# Patient Record
Sex: MALE | Race: BLACK OR AFRICAN AMERICAN | NOT HISPANIC OR LATINO | Employment: FULL TIME | URBAN - METROPOLITAN AREA
[De-identification: names, ages, dates, MRNs, and addresses within clinical notes are randomized per-mention and may not be internally consistent; named-entity substitution may affect disease eponyms.]

---

## 2017-07-29 ENCOUNTER — HOSPITAL ENCOUNTER (EMERGENCY)
Facility: HOSPITAL | Age: 57
Discharge: HOME | End: 2017-07-29
Attending: INTERNAL MEDICINE
Payer: COMMERCIAL

## 2017-07-29 VITALS
SYSTOLIC BLOOD PRESSURE: 141 MMHG | DIASTOLIC BLOOD PRESSURE: 90 MMHG | HEART RATE: 91 BPM | WEIGHT: 239 LBS | BODY MASS INDEX: 30.67 KG/M2 | TEMPERATURE: 98 F | HEIGHT: 74 IN | RESPIRATION RATE: 20 BRPM

## 2017-07-29 DIAGNOSIS — M75.52 ACUTE SHOULDER BURSITIS, LEFT: ICD-10-CM

## 2017-07-29 DIAGNOSIS — I10 UNCONTROLLED HYPERTENSION: Primary | ICD-10-CM

## 2017-07-29 PROCEDURE — 99283 EMERGENCY DEPT VISIT LOW MDM: CPT

## 2017-07-29 PROCEDURE — 25000003 PHARM REV CODE 250: Performed by: INTERNAL MEDICINE

## 2017-07-29 RX ORDER — METFORMIN HYDROCHLORIDE 1000 MG/1
1000 TABLET ORAL 2 TIMES DAILY WITH MEALS
COMMUNITY

## 2017-07-29 RX ORDER — IBUPROFEN 600 MG/1
600 TABLET ORAL EVERY 6 HOURS PRN
Qty: 20 TABLET | Refills: 0 | Status: SHIPPED | OUTPATIENT
Start: 2017-07-29 | End: 2018-01-25

## 2017-07-29 RX ORDER — LOSARTAN POTASSIUM 25 MG/1
25 TABLET ORAL DAILY
COMMUNITY

## 2017-07-29 RX ORDER — GLIPIZIDE 5 MG/1
5 TABLET, FILM COATED, EXTENDED RELEASE ORAL
COMMUNITY

## 2017-07-29 RX ORDER — KETOROLAC TROMETHAMINE 10 MG/1
10 TABLET, FILM COATED ORAL
Status: COMPLETED | OUTPATIENT
Start: 2017-07-29 | End: 2017-07-29

## 2017-07-29 RX ORDER — AMLODIPINE BESYLATE 2.5 MG/1
2.5 TABLET ORAL DAILY
COMMUNITY

## 2017-07-29 RX ADMIN — KETOROLAC TROMETHAMINE 10 MG: 10 TABLET, FILM COATED ORAL at 11:07

## 2017-07-29 NOTE — ED PROVIDER NOTES
Encounter Date: 7/29/2017       History   Pt presents with left shoulder pain for the last 2-3 days. Pt states he think is an old football injury. Denies recent trauma, fever, chest pain, shortness of breath, nausea or vomiting. States strength, sensation and movement are normal.  Chief Complaint   Patient presents with    Shoulder Pain     left shoulder pain flare up x 3 days - no recent injury     The history is provided by the patient.     Review of patient's allergies indicates:  No Known Allergies  Past Medical History:   Diagnosis Date    Diabetes mellitus     Hypertension      Past Surgical History:   Procedure Laterality Date    KNEE SURGERY      right     History reviewed. No pertinent family history.  Social History   Substance Use Topics    Smoking status: Former Smoker    Smokeless tobacco: Never Used    Alcohol use Yes      Comment: occas     Review of Systems   Constitutional: Negative for chills and fever.   HENT: Negative for dental problem and sore throat.    Eyes: Negative for visual disturbance.   Respiratory: Negative for cough and shortness of breath.    Cardiovascular: Negative for chest pain.   Gastrointestinal: Negative for abdominal pain, blood in stool, diarrhea and vomiting.   Genitourinary: Negative for difficulty urinating, discharge, dysuria and testicular pain.   Musculoskeletal: Positive for arthralgias. Negative for back pain and myalgias.   Skin: Negative for rash.   Neurological: Negative for weakness and headaches.   Psychiatric/Behavioral: Negative for sleep disturbance.   All other systems reviewed and are negative.      Physical Exam     Initial Vitals [07/29/17 1059]   BP Pulse Resp Temp SpO2   (!) 141/90 91 20 97.9 °F (36.6 °C) --      MAP       107         Physical Exam    Nursing note and vitals reviewed.  Constitutional: He appears well-developed and well-nourished. No distress.   HENT:   Head: Normocephalic and atraumatic.   Eyes: Pupils are equal, round, and  reactive to light.   Neck: Normal range of motion.   Cardiovascular: Regular rhythm, normal heart sounds and intact distal pulses.   Pulmonary/Chest: Breath sounds normal. No respiratory distress.   Musculoskeletal: Normal range of motion. He exhibits tenderness (Point tenderness ovber posterior acromion area, N-V intact, Full AROM). He exhibits no edema.   Neurological: He is alert and oriented to person, place, and time. He has normal strength.   Skin: Skin is warm and dry. Capillary refill takes less than 2 seconds. No rash noted.   Psychiatric: His behavior is normal.         ED Course   Procedures  Labs Reviewed - No data to display                            ED Course     Clinical Impression:   The primary encounter diagnosis was Uncontrolled hypertension. A diagnosis of Acute shoulder bursitis, left was also pertinent to this visit.                           Saurabh Huertas MD  07/29/17 7411

## 2017-07-31 ENCOUNTER — HOSPITAL ENCOUNTER (OUTPATIENT)
Dept: RADIOLOGY | Facility: HOSPITAL | Age: 57
Discharge: HOME | End: 2017-07-31
Attending: NURSE PRACTITIONER
Payer: COMMERCIAL

## 2017-07-31 DIAGNOSIS — M25.512 LEFT SHOULDER PAIN: ICD-10-CM

## 2017-07-31 DIAGNOSIS — M54.50 CHRONIC LOW BACK PAIN: ICD-10-CM

## 2017-07-31 DIAGNOSIS — M25.512 LEFT SHOULDER PAIN: Primary | ICD-10-CM

## 2017-07-31 DIAGNOSIS — G89.29 CHRONIC LOW BACK PAIN: ICD-10-CM

## 2017-07-31 PROCEDURE — 73030 X-RAY EXAM OF SHOULDER: CPT | Mod: 26,LT,, | Performed by: RADIOLOGY

## 2017-07-31 PROCEDURE — 73030 X-RAY EXAM OF SHOULDER: CPT | Mod: TC,PO,LT

## 2018-01-25 ENCOUNTER — HOSPITAL ENCOUNTER (EMERGENCY)
Facility: HOSPITAL | Age: 58
Discharge: HOME | End: 2018-01-25
Attending: EMERGENCY MEDICINE
Payer: COMMERCIAL

## 2018-01-25 VITALS
WEIGHT: 246 LBS | BODY MASS INDEX: 31.58 KG/M2 | OXYGEN SATURATION: 100 % | DIASTOLIC BLOOD PRESSURE: 89 MMHG | RESPIRATION RATE: 20 BRPM | TEMPERATURE: 98 F | HEART RATE: 74 BPM | SYSTOLIC BLOOD PRESSURE: 152 MMHG

## 2018-01-25 DIAGNOSIS — J06.9 UPPER RESPIRATORY TRACT INFECTION, UNSPECIFIED TYPE: Primary | ICD-10-CM

## 2018-01-25 PROCEDURE — 99283 EMERGENCY DEPT VISIT LOW MDM: CPT

## 2018-01-25 RX ORDER — CETIRIZINE HYDROCHLORIDE 10 MG/1
10 TABLET ORAL DAILY
Qty: 30 TABLET | Refills: 0 | COMMUNITY
Start: 2018-01-25 | End: 2019-01-25

## 2018-01-25 NOTE — ED NOTES
Physical exam as per provider.  Pt sitting quietly on exam table in NAD.  Respirations even and unlabored.  No s/s of distress noted.

## 2018-01-26 ENCOUNTER — HOSPITAL ENCOUNTER (EMERGENCY)
Facility: HOSPITAL | Age: 58
Discharge: HOME | End: 2018-01-26
Payer: COMMERCIAL

## 2018-01-26 VITALS
TEMPERATURE: 98 F | RESPIRATION RATE: 20 BRPM | HEART RATE: 76 BPM | DIASTOLIC BLOOD PRESSURE: 91 MMHG | BODY MASS INDEX: 30.54 KG/M2 | WEIGHT: 238 LBS | HEIGHT: 74 IN | SYSTOLIC BLOOD PRESSURE: 155 MMHG | OXYGEN SATURATION: 100 %

## 2018-01-26 DIAGNOSIS — J06.9 UPPER RESPIRATORY TRACT INFECTION, UNSPECIFIED TYPE: Primary | ICD-10-CM

## 2018-01-26 DIAGNOSIS — R05.9 COUGH: ICD-10-CM

## 2018-01-26 PROCEDURE — 99283 EMERGENCY DEPT VISIT LOW MDM: CPT

## 2018-01-26 RX ORDER — PROMETHAZINE HYDROCHLORIDE AND DEXTROMETHORPHAN HYDROBROMIDE 6.25; 15 MG/5ML; MG/5ML
SYRUP ORAL
Qty: 120 ML | Refills: 0 | Status: SHIPPED | OUTPATIENT
Start: 2018-01-26

## 2018-01-27 NOTE — ED PROVIDER NOTES
"Encounter Date: 1/26/2018       History     Chief Complaint   Patient presents with    Nasal Congestion     with N/V/D since yesterday. pt states "i was here yesterday and they didnt give me anything and im not any better"     The patient presents to the ER c/o cold and flu symptoms for the past 3-4 days. He reports having coughing, sinus congestion, nasal drainage, and chills. He states that the degree is moderate. He states that the course is waxing and waning. He denies any additional symptoms. He states that he was seen in the ER for this yesterday and "they didn't give me anything. I want a shot or some cough syrup or something". He denies any new symptoms or changes since yesterday.           Review of patient's allergies indicates:  No Known Allergies  Past Medical History:   Diagnosis Date    Diabetes mellitus     Hypertension      Past Surgical History:   Procedure Laterality Date    KNEE SURGERY      right     History reviewed. No pertinent family history.  Social History   Substance Use Topics    Smoking status: Former Smoker    Smokeless tobacco: Never Used    Alcohol use Yes      Comment: occas     Review of Systems   Constitutional: Positive for chills. Negative for activity change, appetite change, diaphoresis, fatigue and fever.   HENT: Positive for congestion and rhinorrhea. Negative for ear pain, facial swelling, sore throat, trouble swallowing and voice change.    Eyes: Negative for discharge and redness.   Respiratory: Positive for cough. Negative for chest tightness, shortness of breath, wheezing and stridor.    Cardiovascular: Negative for chest pain.   Gastrointestinal: Negative for abdominal pain, blood in stool, diarrhea and vomiting.   Genitourinary: Negative for decreased urine volume.   Musculoskeletal: Positive for myalgias. Negative for back pain, gait problem, neck pain and neck stiffness.   Skin: Negative for rash.   Neurological: Negative for dizziness, syncope, weakness, " light-headedness and headaches.   Hematological: Negative for adenopathy.   Psychiatric/Behavioral: Negative for confusion.       Physical Exam     Initial Vitals [01/26/18 1721]   BP Pulse Resp Temp SpO2   (!) 155/91 76 20 98.1 °F (36.7 °C) 100 %      MAP       112.33         Physical Exam    Nursing note and vitals reviewed.  Constitutional: He appears well-developed and well-nourished. He is not diaphoretic.   Alert and ambulatory. Non-toxic appearance.    HENT:   Swollen nasal mucosa. Rhinorrhea. Clear throat. No adenopathy. Normal otic exam. No hoarseness.     Eyes: Conjunctivae are normal. Right eye exhibits no discharge. Left eye exhibits no discharge.   Neck: Normal range of motion. Neck supple.   Non-tender. No rigidity.    Cardiovascular: Normal rate, regular rhythm and intact distal pulses.   Pulmonary/Chest: Breath sounds normal. No respiratory distress.   Mild, infrequent cough. No VASQUEZ. No tachypnea or hypoxia. No wheezes. Speaks in complete sentences.    Abdominal: Soft. There is no tenderness. There is no rebound and no guarding.   Musculoskeletal: Normal range of motion. He exhibits no edema.   Lymphadenopathy:     He has no cervical adenopathy.   Neurological: He is alert and oriented to person, place, and time. He has normal strength. No cranial nerve deficit or sensory deficit.   Skin: Skin is warm and dry. No rash noted.   Psychiatric: He has a normal mood and affect. His behavior is normal.         ED Course   Procedures  Labs Reviewed - No data to display          Medical Decision Making:   History:   Old Medical Records: I decided to obtain old medical records.                   ED Course      Clinical Impression:   The primary encounter diagnosis was Upper respiratory tract infection, unspecified type. A diagnosis of Cough was also pertinent to this visit.    Disposition:   Disposition: Discharged  Condition: Stable                        Javier Anderson PA-C  01/26/18 9590

## 2018-01-27 NOTE — ED NOTES
Pt stable, in NAD, and states no further needs at this time. PA aware of vitals. Pt to be d/c'd home.

## 2018-01-29 NOTE — ED PROVIDER NOTES
"Encounter Date: 1/25/2018       History     Chief Complaint   Patient presents with    Shortness of Breath     + nasal congestion; believes he had the flu last week; + "nagging cough" with unknown color phlegm      Patient currently presents with a chief complaint of cough.  Onset was noted 5-7d ago.  There is associated rhinorrhea and congestion.  Fever and chills are denied.  Vomiting and diarrhea are denied.  Over-the-counter remedies have not been attempted.  There has not been purulent nasal discharge. Cough has been nonproductive.              Review of patient's allergies indicates:  No Known Allergies  Past Medical History:   Diagnosis Date    Diabetes mellitus     Hypertension      Past Surgical History:   Procedure Laterality Date    KNEE SURGERY      right     History reviewed. No pertinent family history.  Social History   Substance Use Topics    Smoking status: Former Smoker    Smokeless tobacco: Never Used    Alcohol use Yes      Comment: occas     Review of Systems   Constitutional: Negative for chills and fever.   HENT: Positive for congestion, postnasal drip and rhinorrhea. Negative for sinus pressure.    Respiratory: Positive for cough. Negative for chest tightness and shortness of breath.    Cardiovascular: Negative for chest pain and leg swelling.   Gastrointestinal: Negative for abdominal pain, constipation, diarrhea, nausea and vomiting.   Genitourinary: Negative for dysuria, frequency and urgency.   Skin: Negative for color change and rash.   Allergic/Immunologic: Negative for immunocompromised state.   Neurological: Negative for weakness and numbness.   Hematological: Negative for adenopathy. Does not bruise/bleed easily.   All other systems reviewed and are negative.      Physical Exam     Initial Vitals [01/25/18 0126]   BP Pulse Resp Temp SpO2   (!) 152/89 74 20 98.1 °F (36.7 °C) 100 %      MAP       110         Physical Exam    Nursing note and vitals reviewed.  Constitutional: He " appears well-developed and well-nourished. He is not diaphoretic. No distress.   HENT:   Head: Normocephalic and atraumatic.   Right Ear: External ear normal.   Left Ear: External ear normal.   Nose: Rhinorrhea present.   Mouth/Throat: Oropharynx is clear and moist.   Eyes: Conjunctivae and EOM are normal. Pupils are equal, round, and reactive to light. No scleral icterus.   Neck: Neck supple. No JVD present.   Cardiovascular: Normal rate, regular rhythm, normal heart sounds and intact distal pulses. Exam reveals no gallop and no friction rub.    No murmur heard.  Pulmonary/Chest: Breath sounds normal. No respiratory distress. He has no wheezes. He has no rhonchi. He has no rales.   Abdominal: Soft. Bowel sounds are normal. He exhibits no distension. There is no tenderness.   Musculoskeletal: Normal range of motion. He exhibits no edema.   Neurological: He is alert and oriented to person, place, and time. He has normal strength. No cranial nerve deficit or sensory deficit.   Skin: Skin is warm and dry. No rash noted.   Psychiatric: He has a normal mood and affect. His behavior is normal.         ED Course   Procedures  Labs Reviewed - No data to display          Medical Decision Making:   ED Management:  All findings were reviewed with the patient/family in detail along with the diagnosis of URI.  Patient/family has been advised to use an appropriate antihistamine and expectorant/antitussive agents for symptomatic relief pending resolution and PCP follow-up.  I see no indication of an emergent process beyond that addressed during our encounter but have duly counseled the patient/family regarding the need for prompt follow-up as well as the indications that should prompt immediate return to the emergency room should new or worrisome developments occur.  The patient/family communicates understanding of all this information and all remaining questions and concerns were addressed at this time.  Tye Springer,  MD  6:39 AM                     ED Course      Clinical Impression:   The encounter diagnosis was Upper respiratory tract infection, unspecified type.                           Tye Springer MD  01/29/18 0653

## 2018-07-16 ENCOUNTER — HOSPITAL ENCOUNTER (OUTPATIENT)
Dept: RADIOLOGY | Facility: HOSPITAL | Age: 58
Discharge: HOME | End: 2018-07-16
Attending: NURSE PRACTITIONER
Payer: COMMERCIAL

## 2018-07-16 DIAGNOSIS — R10.11 ABDOMINAL PAIN, RIGHT UPPER QUADRANT: ICD-10-CM

## 2018-07-16 DIAGNOSIS — R10.11 ABDOMINAL PAIN, RIGHT UPPER QUADRANT: Primary | ICD-10-CM

## 2018-07-16 PROCEDURE — 71100 X-RAY EXAM RIBS UNI 2 VIEWS: CPT | Mod: TC,PO

## 2018-07-16 PROCEDURE — 71045 X-RAY EXAM CHEST 1 VIEW: CPT | Mod: TC,PO

## 2018-07-16 PROCEDURE — 71100 X-RAY EXAM RIBS UNI 2 VIEWS: CPT | Mod: 26,,, | Performed by: RADIOLOGY

## 2018-07-16 PROCEDURE — 71045 X-RAY EXAM CHEST 1 VIEW: CPT | Mod: 26,,, | Performed by: RADIOLOGY

## 2018-07-16 PROCEDURE — 76705 ECHO EXAM OF ABDOMEN: CPT | Mod: TC,PO

## 2018-07-16 PROCEDURE — 76705 ECHO EXAM OF ABDOMEN: CPT | Mod: 26,,, | Performed by: RADIOLOGY

## 2021-07-26 ENCOUNTER — HOSPITAL ENCOUNTER (OUTPATIENT)
Dept: RADIOLOGY | Facility: HOSPITAL | Age: 61
Discharge: HOME | End: 2021-07-26
Attending: FAMILY MEDICINE
Payer: MEDICAID

## 2021-07-26 DIAGNOSIS — R10.9 STOMACH PAIN: Primary | ICD-10-CM

## 2021-07-26 DIAGNOSIS — R10.9 STOMACH PAIN: ICD-10-CM

## 2021-07-26 PROCEDURE — 76705 ECHO EXAM OF ABDOMEN: CPT | Mod: TC,PO

## 2021-07-26 PROCEDURE — 76705 ECHO EXAM OF ABDOMEN: CPT | Mod: 26,,, | Performed by: RADIOLOGY

## 2021-07-26 PROCEDURE — 76705 US ABDOMEN LIMITED_HERNIA: ICD-10-PCS | Mod: 26,,, | Performed by: RADIOLOGY

## 2023-09-01 ENCOUNTER — HOSPITAL ENCOUNTER (EMERGENCY)
Facility: HOSPITAL | Age: 63
Discharge: HOME | End: 2023-09-01
Attending: EMERGENCY MEDICINE
Payer: COMMERCIAL

## 2023-09-01 VITALS
WEIGHT: 239.5 LBS | SYSTOLIC BLOOD PRESSURE: 150 MMHG | HEART RATE: 57 BPM | TEMPERATURE: 98 F | BODY MASS INDEX: 30.74 KG/M2 | RESPIRATION RATE: 18 BRPM | HEIGHT: 74 IN | OXYGEN SATURATION: 100 % | DIASTOLIC BLOOD PRESSURE: 79 MMHG

## 2023-09-01 DIAGNOSIS — R79.89 ELEVATED TROPONIN: ICD-10-CM

## 2023-09-01 DIAGNOSIS — R06.02 SOB (SHORTNESS OF BREATH): ICD-10-CM

## 2023-09-01 DIAGNOSIS — E87.5 HYPERKALEMIA: Primary | ICD-10-CM

## 2023-09-01 DIAGNOSIS — R10.13 EPIGASTRIC PAIN: ICD-10-CM

## 2023-09-01 LAB
ALBUMIN SERPL BCP-MCNC: 3.7 G/DL (ref 3.5–5.2)
ALP SERPL-CCNC: 76 U/L (ref 55–135)
ALT SERPL W/O P-5'-P-CCNC: 12 U/L (ref 10–44)
ANION GAP SERPL CALC-SCNC: 7 MMOL/L (ref 8–16)
APTT PPP: 26.3 SEC (ref 21–32)
AST SERPL-CCNC: 17 U/L (ref 10–40)
BASOPHILS # BLD AUTO: 0.03 K/UL (ref 0–0.2)
BASOPHILS NFR BLD: 0.8 % (ref 0–1.9)
BILIRUB SERPL-MCNC: 0.5 MG/DL (ref 0.1–1)
BNP SERPL-MCNC: 63 PG/ML (ref 0–99)
BUN SERPL-MCNC: 34 MG/DL (ref 8–23)
CALCIUM SERPL-MCNC: 8.7 MG/DL (ref 8.7–10.5)
CHLORIDE SERPL-SCNC: 110 MMOL/L (ref 95–110)
CO2 SERPL-SCNC: 21 MMOL/L (ref 23–29)
CREAT SERPL-MCNC: 2.9 MG/DL (ref 0.5–1.4)
CTP QC/QA: YES
DIFFERENTIAL METHOD: ABNORMAL
EOSINOPHIL # BLD AUTO: 0.2 K/UL (ref 0–0.5)
EOSINOPHIL NFR BLD: 3.8 % (ref 0–8)
ERYTHROCYTE [DISTWIDTH] IN BLOOD BY AUTOMATED COUNT: 11.9 % (ref 11.5–14.5)
EST. GFR  (NO RACE VARIABLE): 23.9 ML/MIN/1.73 M^2
GLUCOSE SERPL-MCNC: 160 MG/DL (ref 70–110)
HCT VFR BLD AUTO: 32 % (ref 40–54)
HGB BLD-MCNC: 11 G/DL (ref 14–18)
IMM GRANULOCYTES # BLD AUTO: 0.01 K/UL (ref 0–0.04)
IMM GRANULOCYTES NFR BLD AUTO: 0.3 % (ref 0–0.5)
INR PPP: 1 (ref 0.8–1.2)
LIPASE SERPL-CCNC: 49 U/L (ref 4–60)
LYMPHOCYTES # BLD AUTO: 1 K/UL (ref 1–4.8)
LYMPHOCYTES NFR BLD: 24.1 % (ref 18–48)
MCH RBC QN AUTO: 30.1 PG (ref 27–31)
MCHC RBC AUTO-ENTMCNC: 34.4 G/DL (ref 32–36)
MCV RBC AUTO: 87 FL (ref 82–98)
MONOCYTES # BLD AUTO: 0.4 K/UL (ref 0.3–1)
MONOCYTES NFR BLD: 10.6 % (ref 4–15)
NEUTROPHILS # BLD AUTO: 2.4 K/UL (ref 1.8–7.7)
NEUTROPHILS NFR BLD: 60.4 % (ref 38–73)
NRBC BLD-RTO: 0 /100 WBC
PLATELET # BLD AUTO: 149 K/UL (ref 150–450)
PMV BLD AUTO: 9.2 FL (ref 9.2–12.9)
POCT GLUCOSE: 171 MG/DL (ref 70–110)
POTASSIUM SERPL-SCNC: 5.2 MMOL/L (ref 3.5–5.1)
PROT SERPL-MCNC: 7.4 G/DL (ref 6–8.4)
PROTHROMBIN TIME: 10.9 SEC (ref 9–12.5)
RBC # BLD AUTO: 3.66 M/UL (ref 4.6–6.2)
SARS-COV-2 RDRP RESP QL NAA+PROBE: NEGATIVE
SODIUM SERPL-SCNC: 138 MMOL/L (ref 136–145)
TROPONIN I SERPL DL<=0.01 NG/ML-MCNC: 0.18 NG/ML (ref 0–0.03)
TROPONIN I SERPL DL<=0.01 NG/ML-MCNC: 0.19 NG/ML (ref 0–0.03)
WBC # BLD AUTO: 3.95 K/UL (ref 3.9–12.7)

## 2023-09-01 PROCEDURE — 85025 COMPLETE CBC W/AUTO DIFF WBC: CPT | Mod: ER | Performed by: PHYSICIAN ASSISTANT

## 2023-09-01 PROCEDURE — 80053 COMPREHEN METABOLIC PANEL: CPT | Mod: ER | Performed by: PHYSICIAN ASSISTANT

## 2023-09-01 PROCEDURE — 82962 GLUCOSE BLOOD TEST: CPT | Mod: ER

## 2023-09-01 PROCEDURE — 93005 ELECTROCARDIOGRAM TRACING: CPT | Mod: ER

## 2023-09-01 PROCEDURE — 85730 THROMBOPLASTIN TIME PARTIAL: CPT | Mod: ER | Performed by: EMERGENCY MEDICINE

## 2023-09-01 PROCEDURE — 87635 SARS-COV-2 COVID-19 AMP PRB: CPT | Mod: ER | Performed by: PHYSICIAN ASSISTANT

## 2023-09-01 PROCEDURE — 83880 ASSAY OF NATRIURETIC PEPTIDE: CPT | Mod: ER | Performed by: PHYSICIAN ASSISTANT

## 2023-09-01 PROCEDURE — 83690 ASSAY OF LIPASE: CPT | Mod: ER | Performed by: PHYSICIAN ASSISTANT

## 2023-09-01 PROCEDURE — 99285 EMERGENCY DEPT VISIT HI MDM: CPT | Mod: 25,ER

## 2023-09-01 PROCEDURE — 85610 PROTHROMBIN TIME: CPT | Mod: ER | Performed by: EMERGENCY MEDICINE

## 2023-09-01 PROCEDURE — 25000003 PHARM REV CODE 250: Mod: ER | Performed by: PHYSICIAN ASSISTANT

## 2023-09-01 PROCEDURE — 84484 ASSAY OF TROPONIN QUANT: CPT | Mod: 91,ER | Performed by: EMERGENCY MEDICINE

## 2023-09-01 PROCEDURE — 93010 EKG 12-LEAD: ICD-10-PCS | Mod: ,,, | Performed by: STUDENT IN AN ORGANIZED HEALTH CARE EDUCATION/TRAINING PROGRAM

## 2023-09-01 PROCEDURE — 84484 ASSAY OF TROPONIN QUANT: CPT | Mod: ER | Performed by: PHYSICIAN ASSISTANT

## 2023-09-01 PROCEDURE — 93010 ELECTROCARDIOGRAM REPORT: CPT | Mod: ,,, | Performed by: STUDENT IN AN ORGANIZED HEALTH CARE EDUCATION/TRAINING PROGRAM

## 2023-09-01 RX ORDER — FLUTICASONE PROPIONATE 50 MCG
SPRAY, SUSPENSION (ML) NASAL
COMMUNITY
Start: 2023-08-08

## 2023-09-01 RX ORDER — CARVEDILOL 12.5 MG/1
TABLET ORAL
COMMUNITY
Start: 2023-07-03

## 2023-09-01 RX ORDER — LINAGLIPTIN 5 MG/1
1 TABLET, FILM COATED ORAL DAILY
COMMUNITY
Start: 2023-06-13

## 2023-09-01 RX ADMIN — SODIUM ZIRCONIUM CYCLOSILICATE 10 G: 10 POWDER, FOR SUSPENSION ORAL at 03:09

## 2023-09-01 NOTE — ED PROVIDER NOTES
"   History      Chief Complaint   Patient presents with    Fatigue    Nausea    Nasal Congestion     X 2/3 days       Review of patient's allergies indicates:  No Known Allergies     HPI   HPI    9/1/2023, 2:58 PM   History obtained from the patient       History of Present Illness: Elie Contreras is a 62 y.o. male patient who presents to the Emergency Department for nasal congestion, fatigue, "mild" epigastric pain.  Denies cp, fever, vomiting. Admits to sob.    No further complaints or concerns at this time.           PCP: Rigoberto Champion MD       Past Medical History:  Past Medical History:   Diagnosis Date    Anemia, unspecified     Diabetes mellitus     Hypertension     Mixed hyperlipidemia     Renal disorder          Past Surgical History:  Past Surgical History:   Procedure Laterality Date    CHOLECYSTECTOMY      KNEE SURGERY      right           Family History:  History reviewed. No pertinent family history.        Social History:  Social History     Tobacco Use    Smoking status: Former    Smokeless tobacco: Never   Substance and Sexual Activity    Alcohol use: Yes     Comment: occas    Drug use: No    Sexual activity: Not on file       ROS     Review of Systems   Constitutional:  Positive for fatigue. Negative for fever.   HENT:  Positive for congestion and rhinorrhea.    Respiratory:  Positive for shortness of breath. Negative for chest tightness.    Gastrointestinal:  Negative for vomiting.       Physical Exam      Initial Vitals [09/01/23 1314]   BP Pulse Resp Temp SpO2   (!) 152/78 79 20 98.1 °F (36.7 °C) 99 %      MAP       --         Physical Exam  Vital signs and nursing notes reviewed.  Constitutional: Patient is in NAD. Awake and alert. Well-developed and well-nourished.  Head: Atraumatic. Normocephalic.  Eyes:  EOM intact. Conjunctivae nl. No scleral icterus.  ENT: Mucous membranes are moist.   Neck: Supple.  No meningismus  Cardiovascular: Regular rate and rhythm. No murmurs, rubs, or " "gallops.   Pulmonary/Chest: No respiratory distress. Clear to auscultation bilaterally. No wheezing, rales, or rhonchi.  Abdominal: Soft. Non-distended. No TTP. No rebound, guarding, or rigidity. Good bowel sounds.  Genitourinary: No CVA tenderness  Musculoskeletal: Moves all extremities. No edema.   Skin: Warm and dry.  Neurological: Awake and alert. No acute focal neurological deficits are appreciated.  Psychiatric: Normal affect. Good eye contact. Appropriate in content.      ED Course          Procedures  ED Vital Signs:  Vitals:    09/01/23 1314   BP: (!) 152/78   Pulse: 79   Resp: 20   Temp: 98.1 °F (36.7 °C)   TempSrc: Oral   SpO2: 99%   Weight: 108.7 kg (239 lb 8.5 oz)   Height: 6' 2" (1.88 m)         EKG INTERPRETATION:  13:58    VR:  68  No STEMI  NSR with 1st degree av block      Results for orders placed or performed during the hospital encounter of 09/01/23   CBC auto differential   Result Value Ref Range    WBC 3.95 3.90 - 12.70 K/uL    RBC 3.66 (L) 4.60 - 6.20 M/uL    Hemoglobin 11.0 (L) 14.0 - 18.0 g/dL    Hematocrit 32.0 (L) 40.0 - 54.0 %    MCV 87 82 - 98 fL    MCH 30.1 27.0 - 31.0 pg    MCHC 34.4 32.0 - 36.0 g/dL    RDW 11.9 11.5 - 14.5 %    Platelets 149 (L) 150 - 450 K/uL    MPV 9.2 9.2 - 12.9 fL    Immature Granulocytes 0.3 0.0 - 0.5 %    Gran # (ANC) 2.4 1.8 - 7.7 K/uL    Immature Grans (Abs) 0.01 0.00 - 0.04 K/uL    Lymph # 1.0 1.0 - 4.8 K/uL    Mono # 0.4 0.3 - 1.0 K/uL    Eos # 0.2 0.0 - 0.5 K/uL    Baso # 0.03 0.00 - 0.20 K/uL    nRBC 0 0 /100 WBC    Gran % 60.4 38.0 - 73.0 %    Lymph % 24.1 18.0 - 48.0 %    Mono % 10.6 4.0 - 15.0 %    Eosinophil % 3.8 0.0 - 8.0 %    Basophil % 0.8 0.0 - 1.9 %    Differential Method Automated    Comprehensive metabolic panel   Result Value Ref Range    Sodium 138 136 - 145 mmol/L    Potassium 5.2 (H) 3.5 - 5.1 mmol/L    Chloride 110 95 - 110 mmol/L    CO2 21 (L) 23 - 29 mmol/L    Glucose 160 (H) 70 - 110 mg/dL    BUN 34 (H) 8 - 23 mg/dL    Creatinine 2.9 " (H) 0.5 - 1.4 mg/dL    Calcium 8.7 8.7 - 10.5 mg/dL    Total Protein 7.4 6.0 - 8.4 g/dL    Albumin 3.7 3.5 - 5.2 g/dL    Total Bilirubin 0.5 0.1 - 1.0 mg/dL    Alkaline Phosphatase 76 55 - 135 U/L    AST 17 10 - 40 U/L    ALT 12 10 - 44 U/L    eGFR 23.9 (A) >60 mL/min/1.73 m^2    Anion Gap 7 (L) 8 - 16 mmol/L   Lipase   Result Value Ref Range    Lipase 49 4 - 60 U/L   Troponin I   Result Value Ref Range    Troponin I 0.191 (H) 0.000 - 0.026 ng/mL   Brain natriuretic peptide   Result Value Ref Range    BNP 63 0 - 99 pg/mL   POCT COVID-19 Rapid Screening   Result Value Ref Range    POC Rapid COVID Negative Negative     Acceptable Yes    POCT glucose   Result Value Ref Range    POCT Glucose 171 (H) 70 - 110 mg/dL             Imaging Results:  Imaging Results              X-Ray Chest 1 View (Final result)  Result time 09/01/23 14:22:47      Final result by Jamel Neumann MD (09/01/23 14:22:47)                   Impression:      No acute process seen.      Electronically signed by: Jamel Neumann MD  Date:    09/01/2023  Time:    14:22               Narrative:    EXAMINATION:  XR CHEST 1 VIEW    FINDINGS:  Single view of the chest.  Comparison 07/16/2018.    Cardiac silhouette is normal.  The lungs demonstrate no evidence of active disease.  No evidence of pleural effusion or pneumothorax.  Bones appear intact.                                         The Emergency Provider reviewed the vital signs and test results, which are outlined above.    ED Discussion             Medication(s) given in the ER:  Medications   sodium zirconium cyclosilicate packet 10 g (has no administration in time range)            Follow-up Information       Ayan Elliott MD In 3 days.    Specialties: Nephrology, Internal Medicine  Contact information:  [address]  [address]  [facility] [address] [phone]               Rigoberto Champion MD In 3 days.    Specialty: Internal Medicine  Contact information:  [address]  [facility] [address]  [phone]                                    Medication List        START taking these medications      sodium zirconium cyclosilicate 5 gram packet  Commonly known as: Lokelma  Take 1 packet (5 g total) by mouth once daily. Mix entire contents of packet(s) into drinking glass containing 3 tablespoons of water; stir well and drink immediately. Add water and repeat until no powder remains to receive entire dose. for 3 days            ASK your doctor about these medications      amLODIPine 2.5 MG tablet  Commonly known as: NORVASC     carvediloL 12.5 MG tablet  Commonly known as: COREG     cetirizine 10 MG tablet  Commonly known as: ZYRTEC  Take 1 tablet (10 mg total) by mouth once daily.     fluticasone propionate 50 mcg/actuation nasal spray  Commonly known as: FLONASE     glipiZIDE 5 MG Tr24     losartan 25 MG tablet  Commonly known as: COZAAR     metFORMIN 1000 MG tablet  Commonly known as: GLUCOPHAGE     promethazine-dextromethorphan 6.25-15 mg/5 mL Syrp  Commonly known as: PROMETHAZINE-DM  Take 1 teaspoon PO q 4 hrs prn cold/flu symptoms     TRADJENTA 5 mg Tab tablet  Generic drug: linaGLIPtin               Where to Get Your Medications        These medications were sent to [id number] - [place] - [address]  [address] [place] [address]      Phone: [phone]   sodium zirconium cyclosilicate 5 gram packet             Medical Decision Making        All findings were reviewed with the patient/family in detail.   All remaining questions and concerns were addressed at that time.  Patient/family has been counseled regarding the need for follow-up as well as the indication to return to the emergency room should new or worrisome developments occur.        MDM     Amount and/or Complexity of Data Reviewed  Clinical lab tests: ordered and reviewed (Chronic kidney disease, stable.  Hyperkalemia, similar to last lab value  in early August.  EKG with no peaked T-waves.  COVID negative.  Negative troponin)  Tests in the radiology section of CPT®: ordered and reviewed                   Clinical Impression:        ICD-10-CM ICD-9-CM   1. Hyperkalemia  E87.5 276.7   2. SOB (shortness of breath)  R06.02 786.05               Traci Pacheco, PAVAUGHN  09/01/23 1503

## 2023-10-04 DIAGNOSIS — Z00.00 ROUTINE ADULT HEALTH MAINTENANCE: ICD-10-CM

## 2023-10-04 DIAGNOSIS — Z76.89 ENCOUNTER TO ESTABLISH CARE: Primary | ICD-10-CM

## 2023-10-10 ENCOUNTER — OFFICE VISIT (OUTPATIENT)
Dept: CARDIOLOGY | Facility: CLINIC | Age: 63
End: 2023-10-10
Payer: MEDICARE

## 2023-10-10 ENCOUNTER — HOSPITAL ENCOUNTER (OUTPATIENT)
Dept: CARDIOLOGY | Facility: HOSPITAL | Age: 63
Discharge: HOME | End: 2023-10-10
Attending: INTERNAL MEDICINE
Payer: MEDICARE

## 2023-10-10 VITALS
WEIGHT: 241.63 LBS | DIASTOLIC BLOOD PRESSURE: 70 MMHG | BODY MASS INDEX: 31.02 KG/M2 | HEART RATE: 69 BPM | SYSTOLIC BLOOD PRESSURE: 120 MMHG | OXYGEN SATURATION: 97 %

## 2023-10-10 DIAGNOSIS — Z76.89 ENCOUNTER TO ESTABLISH CARE: ICD-10-CM

## 2023-10-10 DIAGNOSIS — R79.89 ELEVATED TROPONIN: ICD-10-CM

## 2023-10-10 DIAGNOSIS — N18.32 STAGE 3B CHRONIC KIDNEY DISEASE: ICD-10-CM

## 2023-10-10 DIAGNOSIS — R07.89 OTHER CHEST PAIN: Primary | ICD-10-CM

## 2023-10-10 DIAGNOSIS — N18.32 TYPE 2 DIABETES MELLITUS WITH STAGE 3B CHRONIC KIDNEY DISEASE, WITHOUT LONG-TERM CURRENT USE OF INSULIN: ICD-10-CM

## 2023-10-10 DIAGNOSIS — R10.13 EPIGASTRIC PAIN: ICD-10-CM

## 2023-10-10 DIAGNOSIS — E11.22 TYPE 2 DIABETES MELLITUS WITH STAGE 3B CHRONIC KIDNEY DISEASE, WITHOUT LONG-TERM CURRENT USE OF INSULIN: ICD-10-CM

## 2023-10-10 DIAGNOSIS — Z00.00 ROUTINE ADULT HEALTH MAINTENANCE: ICD-10-CM

## 2023-10-10 DIAGNOSIS — Z87.891 EX-SMOKER: ICD-10-CM

## 2023-10-10 DIAGNOSIS — E78.5 HYPERLIPIDEMIA, UNSPECIFIED HYPERLIPIDEMIA TYPE: ICD-10-CM

## 2023-10-10 PROCEDURE — 99999 PR PBB SHADOW E&M-EST. PATIENT-LVL III: CPT | Mod: PBBFAC,,, | Performed by: INTERNAL MEDICINE

## 2023-10-10 PROCEDURE — 3074F SYST BP LT 130 MM HG: CPT | Mod: CPTII,S$GLB,, | Performed by: INTERNAL MEDICINE

## 2023-10-10 PROCEDURE — 99204 PR OFFICE/OUTPT VISIT, NEW, LEVL IV, 45-59 MIN: ICD-10-PCS | Mod: S$GLB,,, | Performed by: INTERNAL MEDICINE

## 2023-10-10 PROCEDURE — 4010F ACE/ARB THERAPY RXD/TAKEN: CPT | Mod: CPTII,S$GLB,, | Performed by: INTERNAL MEDICINE

## 2023-10-10 PROCEDURE — 1159F MED LIST DOCD IN RCRD: CPT | Mod: CPTII,S$GLB,, | Performed by: INTERNAL MEDICINE

## 2023-10-10 PROCEDURE — 3051F HG A1C>EQUAL 7.0%<8.0%: CPT | Mod: CPTII,S$GLB,, | Performed by: INTERNAL MEDICINE

## 2023-10-10 PROCEDURE — 3078F DIAST BP <80 MM HG: CPT | Mod: CPTII,S$GLB,, | Performed by: INTERNAL MEDICINE

## 2023-10-10 PROCEDURE — 4010F PR ACE/ARB THEARPY RXD/TAKEN: ICD-10-PCS | Mod: CPTII,S$GLB,, | Performed by: INTERNAL MEDICINE

## 2023-10-10 PROCEDURE — 1159F PR MEDICATION LIST DOCUMENTED IN MEDICAL RECORD: ICD-10-PCS | Mod: CPTII,S$GLB,, | Performed by: INTERNAL MEDICINE

## 2023-10-10 PROCEDURE — 3008F PR BODY MASS INDEX (BMI) DOCUMENTED: ICD-10-PCS | Mod: CPTII,S$GLB,, | Performed by: INTERNAL MEDICINE

## 2023-10-10 PROCEDURE — 3008F BODY MASS INDEX DOCD: CPT | Mod: CPTII,S$GLB,, | Performed by: INTERNAL MEDICINE

## 2023-10-10 PROCEDURE — 99999 PR PBB SHADOW E&M-EST. PATIENT-LVL III: ICD-10-PCS | Mod: PBBFAC,,, | Performed by: INTERNAL MEDICINE

## 2023-10-10 PROCEDURE — 99204 OFFICE O/P NEW MOD 45 MIN: CPT | Mod: S$GLB,,, | Performed by: INTERNAL MEDICINE

## 2023-10-10 PROCEDURE — 3051F PR MOST RECENT HEMOGLOBIN A1C LEVEL 7.0 - < 8.0%: ICD-10-PCS | Mod: CPTII,S$GLB,, | Performed by: INTERNAL MEDICINE

## 2023-10-10 PROCEDURE — 93005 ELECTROCARDIOGRAM TRACING: CPT

## 2023-10-10 PROCEDURE — 93010 EKG 12-LEAD: ICD-10-PCS | Mod: ,,, | Performed by: INTERNAL MEDICINE

## 2023-10-10 PROCEDURE — 93010 ELECTROCARDIOGRAM REPORT: CPT | Mod: ,,, | Performed by: INTERNAL MEDICINE

## 2023-10-10 PROCEDURE — 3078F PR MOST RECENT DIASTOLIC BLOOD PRESSURE < 80 MM HG: ICD-10-PCS | Mod: CPTII,S$GLB,, | Performed by: INTERNAL MEDICINE

## 2023-10-10 PROCEDURE — 3074F PR MOST RECENT SYSTOLIC BLOOD PRESSURE < 130 MM HG: ICD-10-PCS | Mod: CPTII,S$GLB,, | Performed by: INTERNAL MEDICINE

## 2023-10-10 RX ORDER — SODIUM BICARBONATE 650 MG/1
650 TABLET ORAL 3 TIMES DAILY
COMMUNITY
Start: 2023-07-17

## 2023-10-10 RX ORDER — ASPIRIN 81 MG/1
81 TABLET ORAL DAILY
Qty: 360 TABLET | Refills: 0 | Status: SHIPPED | OUTPATIENT
Start: 2023-10-10 | End: 2024-10-09

## 2023-10-10 RX ORDER — ATORVASTATIN CALCIUM 40 MG/1
40 TABLET, FILM COATED ORAL NIGHTLY
Qty: 90 TABLET | Refills: 3 | Status: SHIPPED | OUTPATIENT
Start: 2023-10-10 | End: 2024-10-09

## 2023-10-10 NOTE — PROGRESS NOTES
Subjective:   Patient ID:  Elie Contreras is a 63 y.o. male who presents for evaluation of No chief complaint on file.      HPI  63-year-old male, ex heavy smoker, alcohol use occasionally.    Hypertension on carvedilol.  He presented about a month ago to the emergency room with multiple symptoms of dyspnea chest discomfort.  He had a blood test showing troponin elevation however similar to 1 in the past.  He was discharged home since he did not have specific symptoms.    His EKG does show T-wave inversion laterally however could also be hypertrophic related.    He states that he walks around his house for half an hour without any chest pain.  Or dyspnea.        Past Medical History:   Diagnosis Date    Anemia, unspecified     Diabetes mellitus     Hypertension     Mixed hyperlipidemia     Renal disorder        Past Surgical History:   Procedure Laterality Date    CHOLECYSTECTOMY      KNEE SURGERY      right       Social History     Tobacco Use    Smoking status: Former    Smokeless tobacco: Never   Substance Use Topics    Alcohol use: Yes     Comment: occas    Drug use: No       History reviewed. No pertinent family history.    Review of Systems   Cardiovascular:  Negative for chest pain, dyspnea on exertion, palpitations and syncope.   Genitourinary: Negative.    Neurological: Negative.        Current Outpatient Medications on File Prior to Visit   Medication Sig    amlodipine (NORVASC) 2.5 MG tablet Take 2.5 mg by mouth once daily.    carvediloL (COREG) 12.5 MG tablet TAKE 1 TABLET BY MOUTH IN THE MORNING AND 1 IN THE EVENING WITH MEALS    fluticasone propionate (FLONASE) 50 mcg/actuation nasal spray USE 1 SPRAY(S) IN EACH NOSTRIL ONCE DAILY AS NEEDED FOR CONGESTION    glipiZIDE (GLUCOTROL) 5 MG TR24 Take 5 mg by mouth daily with breakfast.    linaGLIPtin (TRADJENTA) 5 mg Tab tablet Take 1 tablet by mouth once daily.    losartan (COZAAR) 25 MG tablet Take 25 mg by mouth once daily.     "promethazine-dextromethorphan (PROMETHAZINE-DM) 6.25-15 mg/5 mL Syrp Take 1 teaspoon PO q 4 hrs prn cold/flu symptoms    cetirizine (ZYRTEC) 10 MG tablet Take 1 tablet (10 mg total) by mouth once daily.    metformin (GLUCOPHAGE) 1000 MG tablet Take 1,000 mg by mouth 2 (two) times daily with meals.    sodium bicarbonate 650 MG tablet Take 650 mg by mouth 3 (three) times daily.     No current facility-administered medications on file prior to visit.       Objective:   Objective:  Wt Readings from Last 3 Encounters:   10/10/23 109.6 kg (241 lb 10 oz)   09/01/23 108.7 kg (239 lb 8.5 oz)   01/26/18 108 kg (238 lb)     Temp Readings from Last 3 Encounters:   09/01/23 98.1 °F (36.7 °C) (Oral)   01/26/18 98.1 °F (36.7 °C) (Oral)   01/25/18 98.1 °F (36.7 °C) (Oral)     BP Readings from Last 3 Encounters:   10/10/23 120/70   09/01/23 (!) 150/79   01/26/18 (!) 155/91     Pulse Readings from Last 3 Encounters:   10/10/23 69   09/01/23 (!) 57   01/26/18 76       Physical Exam  Vitals reviewed.   Constitutional:       Appearance: He is well-developed.   Neck:      Vascular: No carotid bruit.   Cardiovascular:      Rate and Rhythm: Normal rate and regular rhythm.      Pulses: Intact distal pulses.      Heart sounds: Normal heart sounds. No murmur heard.  Pulmonary:      Breath sounds: Normal breath sounds.   Neurological:      Mental Status: He is oriented to person, place, and time.         Lab Results   Component Value Date    CHOL 172 08/08/2023    CHOL 172 01/26/2023    CHOL 170 05/04/2022     Lab Results   Component Value Date    HDL 36 (L) 08/08/2023    HDL 37 (L) 01/26/2023    HDL 33 (L) 05/04/2022     Lab Results   Component Value Date    LDLCALC 121 (H) 08/08/2023    LDLCALC 122 (H) 01/26/2023    LDLCALC 119 (H) 05/04/2022     Lab Results   Component Value Date    TRIG 80 08/08/2023    TRIG 66 01/26/2023    TRIG 99 05/04/2022     No results found for: "CHOLHDL"    Chemistry        Component Value Date/Time     " 09/01/2023 1405    K 5.2 (H) 09/01/2023 1405     09/01/2023 1405    CO2 21 (L) 09/01/2023 1405    BUN 34 (H) 09/01/2023 1405    CREATININE 2.9 (H) 09/01/2023 1405     (H) 09/01/2023 1405        Component Value Date/Time    CALCIUM 8.7 09/01/2023 1405    ALKPHOS 76 09/01/2023 1405    AST 17 09/01/2023 1405    ALT 12 09/01/2023 1405    BILITOT 0.5 09/01/2023 1405          Lab Results   Component Value Date    TSH 5.470 (H) 02/07/2023     Lab Results   Component Value Date    INR 1.0 09/01/2023     Lab Results   Component Value Date    WBC 3.95 09/01/2023    HGB 11.0 (L) 09/01/2023    HCT 32.0 (L) 09/01/2023    MCV 87 09/01/2023     (L) 09/01/2023     BNP  @LABRCNTIP(BNP,BNPTRIAGEBLO)@  CrCl cannot be calculated (Patient's most recent lab result is older than the maximum 7 days allowed.).     Imaging:  ======    No results found for this or any previous visit.    No results found for this or any previous visit.    No results found for this or any previous visit.    No results found for this or any previous visit.    No valid procedures specified.    No results found for this or any previous visit.      No results found for this or any previous visit.      No results found for this or any previous visit.      Diagnostic Results:  ECG: Reviewed  Sinus rhythm with 1st degree A-V block   T wave abnormality, consider inferolateral ischemia   Abnormal ECG   When compared with ECG of 01-SEP-2023 13:58,   No significant change was found   Confirmed by SARITA SOLORZANO MD ([id number]) on 10/10/2023 10:29:24 AM     The 10-year ASCVD risk score (Valdez GREWAL, et al., 2019) is: 15.3%    Values used to calculate the score:      Age: 63 years      Sex: Male      Is Non- : Yes      Diabetic: Yes      Tobacco smoker: No      Systolic Blood Pressure: 120 mmHg      Is BP treated: No      HDL Cholesterol: 36 mg/dL      Total Cholesterol: 172 mg/dL        Assessment and Plan:   Other chest pain  -      Stress Echo Which stress agent will be used? Treadmill Exercise; Color Flow Doppler? No; Future    Elevated troponin  -     Ambulatory referral/consult to Cardiology  -     Stress Echo Which stress agent will be used? Treadmill Exercise; Color Flow Doppler? No; Future  -     aspirin (ECOTRIN) 81 MG EC tablet; Take 1 tablet (81 mg total) by mouth once daily.  Dispense: 360 tablet; Refill: 0    Epigastric pain  -     Ambulatory referral/consult to Cardiology    Hyperlipidemia, unspecified hyperlipidemia type  -     atorvastatin (LIPITOR) 40 MG tablet; Take 1 tablet (40 mg total) by mouth every evening.  Dispense: 90 tablet; Refill: 3    Ex-smoker    Stage 3b chronic kidney disease    Type 2 diabetes mellitus with stage 3b chronic kidney disease, without long-term current use of insulin      Elevated troponin possibly from CKD, atypical symptoms resolved.  Abnormal EKG.  We will get a stress echo.    Start aspirin.  Statin LDL not at goal.    Explained if abnormal stress test may need a heart catheterization however this is to be weighed with risk possible risk for CUONG.  Reviewed all tests and above medical conditions with patient in detail and formulated treatment plan.  Risk factor modification discussed.   Cardiac low salt diet discussed.  Maintaining healthy weight and weight loss goals were discussed in clinic.    Follow up in  6 months

## 2023-12-15 ENCOUNTER — TELEPHONE (OUTPATIENT)
Dept: CARDIOLOGY | Facility: HOSPITAL | Age: 63
End: 2023-12-15
Payer: MEDICARE

## 2023-12-27 ENCOUNTER — HOSPITAL ENCOUNTER (OUTPATIENT)
Dept: CARDIOLOGY | Facility: HOSPITAL | Age: 63
Discharge: HOME | End: 2023-12-27
Attending: INTERNAL MEDICINE
Payer: MEDICARE

## 2023-12-27 VITALS
HEIGHT: 74 IN | DIASTOLIC BLOOD PRESSURE: 91 MMHG | WEIGHT: 241 LBS | BODY MASS INDEX: 30.93 KG/M2 | SYSTOLIC BLOOD PRESSURE: 155 MMHG

## 2023-12-27 DIAGNOSIS — R79.89 ELEVATED TROPONIN: ICD-10-CM

## 2023-12-27 DIAGNOSIS — R07.89 OTHER CHEST PAIN: ICD-10-CM

## 2023-12-27 LAB
AORTIC ROOT ANNULUS: 4.18 CM
ASCENDING AORTA: 3.42 CM
AV INDEX (PROSTH): 0.46
AV MEAN GRADIENT: 8 MMHG
AV PEAK GRADIENT: 12 MMHG
AV REGURGITATION PRESSURE HALF TIME: 662.86 MS
AV VALVE AREA BY VELOCITY RATIO: 1.85 CM²
AV VALVE AREA: 2.1 CM²
AV VELOCITY RATIO: 0.41
BSA FOR ECHO PROCEDURE: 2.39 M2
CV ECHO LV RWT: 0.46 CM
DOP CALC AO PEAK VEL: 1.76 M/S
DOP CALC AO VTI: 32.7 CM
DOP CALC LVOT AREA: 4.5 CM2
DOP CALC LVOT DIAMETER: 2.4 CM
DOP CALC LVOT PEAK VEL: 0.72 M/S
DOP CALC LVOT STROKE VOLUME: 68.73 CM3
DOP CALC RVOT PEAK VEL: 0.44 M/S
DOP CALC RVOT VTI: 8.5 CM
DOP CALCLVOT PEAK VEL VTI: 15.2 CM
E WAVE DECELERATION TIME: 285.26 MSEC
E/A RATIO: 0.5
E/E' RATIO: 12 M/S
ECHO LV POSTERIOR WALL: 1.26 CM (ref 0.6–1.1)
FRACTIONAL SHORTENING: 17 % (ref 28–44)
INTERVENTRICULAR SEPTUM: 1.1 CM (ref 0.6–1.1)
IVC DIAMETER: 1.2 CM
IVRT: 82.78 MSEC
LA MAJOR: 6.08 CM
LA MINOR: 5.31 CM
LA WIDTH: 4.3 CM
LEFT ATRIUM SIZE: 4.12 CM
LEFT ATRIUM VOLUME INDEX: 36.3 ML/M2
LEFT ATRIUM VOLUME: 85.37 CM3
LEFT INTERNAL DIMENSION IN SYSTOLE: 4.57 CM (ref 2.1–4)
LEFT VENTRICLE DIASTOLIC VOLUME INDEX: 62.13 ML/M2
LEFT VENTRICLE DIASTOLIC VOLUME: 146.01 ML
LEFT VENTRICLE MASS INDEX: 113 G/M2
LEFT VENTRICLE SYSTOLIC VOLUME INDEX: 40.8 ML/M2
LEFT VENTRICLE SYSTOLIC VOLUME: 95.91 ML
LEFT VENTRICULAR INTERNAL DIMENSION IN DIASTOLE: 5.48 CM (ref 3.5–6)
LEFT VENTRICULAR MASS: 264.61 G
LV LATERAL E/E' RATIO: 14 M/S
LV SEPTAL E/E' RATIO: 10.5 M/S
LVOT MG: 1.28 MMHG
LVOT MV: 0.54 CM/S
MV PEAK A VEL: 0.84 M/S
MV PEAK E VEL: 0.42 M/S
MV STENOSIS PRESSURE HALF TIME: 82.72 MS
MV VALVE AREA P 1/2 METHOD: 2.66 CM2
OHS LV EJECTION FRACTION SIMPSONS BIPLANE MOD: 33 %
PISA AR MAX VEL: 4.98 M/S
PISA TR MAX VEL: 2.33 M/S
PULM VEIN S/D RATIO: 1.42
PV MEAN GRADIENT: 0 MMHG
PV MV: 0.45 M/S
PV PEAK D VEL: 0.26 M/S
PV PEAK GRADIENT: 2 MMHG
PV PEAK S VEL: 0.37 M/S
PV PEAK VELOCITY: 0.74 M/S
RA MAJOR: 5.28 CM
RA PRESSURE ESTIMATED: 3 MMHG
RA WIDTH: 3.99 CM
RIGHT VENTRICULAR END-DIASTOLIC DIMENSION: 3.74 CM
RV TB RVSP: 5 MMHG
RV TISSUE DOPPLER FREE WALL SYSTOLIC VELOCITY 1 (APICAL 4 CHAMBER VIEW): 12.12 CM/S
SINUS: 3.95 CM
STJ: 2.77 CM
TDI LATERAL: 0.03 M/S
TDI SEPTAL: 0.04 M/S
TDI: 0.04 M/S
TR MAX PG: 22 MMHG
TRICUSPID ANNULAR PLANE SYSTOLIC EXCURSION: 1.82 CM
TV REST PULMONARY ARTERY PRESSURE: 25 MMHG
Z-SCORE OF LEFT VENTRICULAR DIMENSION IN END DIASTOLE: -5.84
Z-SCORE OF LEFT VENTRICULAR DIMENSION IN END SYSTOLE: -2.03

## 2023-12-27 PROCEDURE — 93306 ECHO (CUPID ONLY): ICD-10-PCS | Mod: 26,,, | Performed by: INTERNAL MEDICINE

## 2023-12-27 PROCEDURE — 93306 TTE W/DOPPLER COMPLETE: CPT

## 2023-12-27 PROCEDURE — 93306 TTE W/DOPPLER COMPLETE: CPT | Mod: 26,,, | Performed by: INTERNAL MEDICINE

## 2024-01-03 ENCOUNTER — TELEPHONE (OUTPATIENT)
Dept: CARDIOLOGY | Facility: CLINIC | Age: 64
End: 2024-01-03
Payer: MEDICARE

## 2024-01-03 DIAGNOSIS — I50.22 CHRONIC SYSTOLIC (CONGESTIVE) HEART FAILURE: ICD-10-CM

## 2024-01-03 DIAGNOSIS — I50.22 CHRONIC SYSTOLIC (CONGESTIVE) HEART FAILURE: Primary | ICD-10-CM

## 2024-01-03 DIAGNOSIS — I42.8 OTHER CARDIOMYOPATHY: Primary | ICD-10-CM

## 2024-01-03 DIAGNOSIS — I42.8 OTHER CARDIOMYOPATHY: ICD-10-CM

## 2024-01-03 NOTE — TELEPHONE ENCOUNTER
Called about abnormal echocardiogram results.  With EF ranging 35-40%.    We will get a nuclear stress test done.    Patient was made aware.

## 2024-01-22 ENCOUNTER — APPOINTMENT (OUTPATIENT)
Dept: LAB | Facility: HOSPITAL | Age: 64
End: 2024-01-22
Attending: INTERNAL MEDICINE
Payer: MEDICARE

## 2024-01-23 ENCOUNTER — HOSPITAL ENCOUNTER (OUTPATIENT)
Dept: RADIOLOGY | Facility: HOSPITAL | Age: 64
Discharge: HOME | End: 2024-01-23
Attending: INTERNAL MEDICINE
Payer: MEDICARE

## 2024-01-23 ENCOUNTER — HOSPITAL ENCOUNTER (OUTPATIENT)
Dept: CARDIOLOGY | Facility: HOSPITAL | Age: 64
Discharge: HOME | End: 2024-01-23
Attending: INTERNAL MEDICINE
Payer: MEDICARE

## 2024-01-23 DIAGNOSIS — I42.8 OTHER CARDIOMYOPATHY: ICD-10-CM

## 2024-01-23 DIAGNOSIS — I50.22 CHRONIC SYSTOLIC (CONGESTIVE) HEART FAILURE: ICD-10-CM

## 2024-01-23 LAB
CV STRESS BASE HR: 69 BPM
DIASTOLIC BLOOD PRESSURE: 71 MMHG
NUC REST DIASTOLIC VOLUME INDEX: 175
NUC REST EJECTION FRACTION: 36
NUC REST SYSTOLIC VOLUME INDEX: 112
NUC STRESS DIASTOLIC VOLUME INDEX: 203
NUC STRESS EJECTION FRACTION: 40 %
NUC STRESS SYSTOLIC VOLUME INDEX: 121
OHS CV CPX 85 PERCENT MAX PREDICTED HEART RATE MALE: 134
OHS CV CPX MAX PREDICTED HEART RATE: 158
OHS CV CPX PATIENT IS FEMALE: 0
OHS CV CPX PATIENT IS MALE: 1
OHS CV CPX PEAK DIASTOLIC BLOOD PRESSURE: 75 MMHG
OHS CV CPX PEAK HEAR RATE: 94 BPM
OHS CV CPX PEAK RATE PRESSURE PRODUCT: NORMAL
OHS CV CPX PEAK SYSTOLIC BLOOD PRESSURE: 116 MMHG
OHS CV CPX PERCENT MAX PREDICTED HEART RATE ACHIEVED: 59
OHS CV CPX RATE PRESSURE PRODUCT PRESENTING: 8004
SYSTOLIC BLOOD PRESSURE: 116 MMHG

## 2024-01-23 PROCEDURE — 93018 CV STRESS TEST I&R ONLY: CPT | Mod: ,,, | Performed by: STUDENT IN AN ORGANIZED HEALTH CARE EDUCATION/TRAINING PROGRAM

## 2024-01-23 PROCEDURE — 78452 HT MUSCLE IMAGE SPECT MULT: CPT | Mod: 26,,, | Performed by: STUDENT IN AN ORGANIZED HEALTH CARE EDUCATION/TRAINING PROGRAM

## 2024-01-23 PROCEDURE — 93017 CV STRESS TEST TRACING ONLY: CPT

## 2024-01-23 PROCEDURE — 93016 CV STRESS TEST SUPVJ ONLY: CPT | Mod: ,,, | Performed by: STUDENT IN AN ORGANIZED HEALTH CARE EDUCATION/TRAINING PROGRAM

## 2024-01-23 PROCEDURE — 63600175 PHARM REV CODE 636 W HCPCS: Performed by: INTERNAL MEDICINE

## 2024-01-23 RX ORDER — REGADENOSON 0.08 MG/ML
0.4 INJECTION, SOLUTION INTRAVENOUS ONCE
Status: COMPLETED | OUTPATIENT
Start: 2024-01-23 | End: 2024-01-23

## 2024-01-23 RX ADMIN — REGADENOSON 0.4 MG: 0.08 INJECTION, SOLUTION INTRAVENOUS at 10:01

## 2024-01-26 ENCOUNTER — TELEPHONE (OUTPATIENT)
Dept: CARDIOLOGY | Facility: CLINIC | Age: 64
End: 2024-01-26
Payer: MEDICARE

## 2024-01-26 NOTE — TELEPHONE ENCOUNTER
Attempted to contact patient; M for patient to call back for results.         ----- Message from Tan Brush MD sent at 1/26/2024  2:02 PM CST -----  Please make patient aware I have reviewed his results of his nuclear stress test.  Appears to have no significant blockage.  Continue to take his blood pressure medications.  His depressed heart function is likely secondary to his history of high blood pressure.    We will discuss more in depth on his next visit.    Thank you  ----- Message -----  From: Addie Kim MD  Sent: 1/23/2024   3:52 PM CST  To: Tan Brush MD       Rituxan Pregnancy And Lactation Text: This medication is Pregnancy Category C and it isn't know if it is safe during pregnancy. It is unknown if this medication is excreted in breast milk but similar antibodies are known to be excreted.

## 2024-02-23 ENCOUNTER — LAB VISIT (OUTPATIENT)
Dept: LAB | Facility: HOSPITAL | Age: 64
End: 2024-02-23
Attending: INTERNAL MEDICINE
Payer: MEDICARE

## 2024-02-23 DIAGNOSIS — N18.4 CHRONIC KIDNEY DISEASE, STAGE IV (SEVERE): ICD-10-CM

## 2024-02-23 LAB
ALBUMIN SERPL BCP-MCNC: 3 G/DL (ref 3.5–5.2)
ANION GAP SERPL CALC-SCNC: 10 MMOL/L (ref 8–16)
BASOPHILS # BLD AUTO: 0.02 K/UL (ref 0–0.2)
BASOPHILS NFR BLD: 0.5 % (ref 0–1.9)
BUN SERPL-MCNC: 38 MG/DL (ref 8–23)
CALCIUM SERPL-MCNC: 8.7 MG/DL (ref 8.7–10.5)
CHLORIDE SERPL-SCNC: 105 MMOL/L (ref 95–110)
CO2 SERPL-SCNC: 23 MMOL/L (ref 23–29)
CREAT SERPL-MCNC: 3.9 MG/DL (ref 0.5–1.4)
DIFFERENTIAL METHOD BLD: ABNORMAL
EOSINOPHIL # BLD AUTO: 0.2 K/UL (ref 0–0.5)
EOSINOPHIL NFR BLD: 3.8 % (ref 0–8)
ERYTHROCYTE [DISTWIDTH] IN BLOOD BY AUTOMATED COUNT: 11.6 % (ref 11.5–14.5)
EST. GFR  (NO RACE VARIABLE): 16.6 ML/MIN/1.73 M^2
GLUCOSE SERPL-MCNC: 120 MG/DL (ref 70–110)
HCT VFR BLD AUTO: 30.5 % (ref 40–54)
HGB BLD-MCNC: 10.4 G/DL (ref 14–18)
IMM GRANULOCYTES # BLD AUTO: 0.01 K/UL (ref 0–0.04)
IMM GRANULOCYTES NFR BLD AUTO: 0.2 % (ref 0–0.5)
LYMPHOCYTES # BLD AUTO: 0.8 K/UL (ref 1–4.8)
LYMPHOCYTES NFR BLD: 18.8 % (ref 18–48)
MCH RBC QN AUTO: 29.8 PG (ref 27–31)
MCHC RBC AUTO-ENTMCNC: 34.1 G/DL (ref 32–36)
MCV RBC AUTO: 87 FL (ref 82–98)
MONOCYTES # BLD AUTO: 0.6 K/UL (ref 0.3–1)
MONOCYTES NFR BLD: 14.5 % (ref 4–15)
NEUTROPHILS # BLD AUTO: 2.6 K/UL (ref 1.8–7.7)
NEUTROPHILS NFR BLD: 62.2 % (ref 38–73)
NRBC BLD-RTO: 0 /100 WBC
PHOSPHATE SERPL-MCNC: 3.1 MG/DL (ref 2.7–4.5)
PLATELET # BLD AUTO: 198 K/UL (ref 150–450)
PMV BLD AUTO: 8.1 FL (ref 9.2–12.9)
POTASSIUM SERPL-SCNC: 5 MMOL/L (ref 3.5–5.1)
RBC # BLD AUTO: 3.49 M/UL (ref 4.6–6.2)
SODIUM SERPL-SCNC: 138 MMOL/L (ref 136–145)
WBC # BLD AUTO: 4.21 K/UL (ref 3.9–12.7)

## 2024-02-23 PROCEDURE — 80069 RENAL FUNCTION PANEL: CPT | Mod: PO | Performed by: INTERNAL MEDICINE

## 2024-02-23 PROCEDURE — 85025 COMPLETE CBC W/AUTO DIFF WBC: CPT | Mod: PO | Performed by: INTERNAL MEDICINE

## 2024-02-23 PROCEDURE — 36415 COLL VENOUS BLD VENIPUNCTURE: CPT | Mod: PO | Performed by: INTERNAL MEDICINE

## 2024-04-01 ENCOUNTER — LAB VISIT (OUTPATIENT)
Dept: LAB | Facility: HOSPITAL | Age: 64
End: 2024-04-01
Attending: INTERNAL MEDICINE
Payer: MEDICARE

## 2024-04-01 DIAGNOSIS — N18.4 CHRONIC KIDNEY DISEASE, STAGE IV (SEVERE): ICD-10-CM

## 2024-04-01 LAB
ALBUMIN SERPL BCP-MCNC: 3.2 G/DL (ref 3.5–5.2)
ANION GAP SERPL CALC-SCNC: 9 MMOL/L (ref 8–16)
BASOPHILS # BLD AUTO: 0.03 K/UL (ref 0–0.2)
BASOPHILS NFR BLD: 0.7 % (ref 0–1.9)
BUN SERPL-MCNC: 40 MG/DL (ref 8–23)
CALCIUM SERPL-MCNC: 9.1 MG/DL (ref 8.7–10.5)
CHLORIDE SERPL-SCNC: 107 MMOL/L (ref 95–110)
CO2 SERPL-SCNC: 22 MMOL/L (ref 23–29)
CREAT SERPL-MCNC: 3.4 MG/DL (ref 0.5–1.4)
DIFFERENTIAL METHOD BLD: ABNORMAL
EOSINOPHIL # BLD AUTO: 0.1 K/UL (ref 0–0.5)
EOSINOPHIL NFR BLD: 3.2 % (ref 0–8)
ERYTHROCYTE [DISTWIDTH] IN BLOOD BY AUTOMATED COUNT: 12.7 % (ref 11.5–14.5)
EST. GFR  (NO RACE VARIABLE): 19.6 ML/MIN/1.73 M^2
GLUCOSE SERPL-MCNC: 128 MG/DL (ref 70–110)
HCT VFR BLD AUTO: 31.1 % (ref 40–54)
HGB BLD-MCNC: 10.5 G/DL (ref 14–18)
IMM GRANULOCYTES # BLD AUTO: 0.02 K/UL (ref 0–0.04)
IMM GRANULOCYTES NFR BLD AUTO: 0.5 % (ref 0–0.5)
LYMPHOCYTES # BLD AUTO: 1 K/UL (ref 1–4.8)
LYMPHOCYTES NFR BLD: 23.7 % (ref 18–48)
MCH RBC QN AUTO: 29.3 PG (ref 27–31)
MCHC RBC AUTO-ENTMCNC: 33.8 G/DL (ref 32–36)
MCV RBC AUTO: 87 FL (ref 82–98)
MONOCYTES # BLD AUTO: 0.5 K/UL (ref 0.3–1)
MONOCYTES NFR BLD: 11.2 % (ref 4–15)
NEUTROPHILS # BLD AUTO: 2.5 K/UL (ref 1.8–7.7)
NEUTROPHILS NFR BLD: 60.7 % (ref 38–73)
NRBC BLD-RTO: 0 /100 WBC
PHOSPHATE SERPL-MCNC: 3.2 MG/DL (ref 2.7–4.5)
PLATELET # BLD AUTO: 169 K/UL (ref 150–450)
PMV BLD AUTO: 8.4 FL (ref 9.2–12.9)
POTASSIUM SERPL-SCNC: 4.8 MMOL/L (ref 3.5–5.1)
RBC # BLD AUTO: 3.58 M/UL (ref 4.6–6.2)
SODIUM SERPL-SCNC: 138 MMOL/L (ref 136–145)
WBC # BLD AUTO: 4.09 K/UL (ref 3.9–12.7)

## 2024-04-01 PROCEDURE — 36415 COLL VENOUS BLD VENIPUNCTURE: CPT | Mod: PO | Performed by: INTERNAL MEDICINE

## 2024-04-01 PROCEDURE — 85025 COMPLETE CBC W/AUTO DIFF WBC: CPT | Mod: PO | Performed by: INTERNAL MEDICINE

## 2024-04-01 PROCEDURE — 80069 RENAL FUNCTION PANEL: CPT | Mod: PO | Performed by: INTERNAL MEDICINE

## 2024-04-06 ENCOUNTER — HOSPITAL ENCOUNTER (EMERGENCY)
Facility: HOSPITAL | Age: 64
Discharge: HOME | End: 2024-04-06
Attending: EMERGENCY MEDICINE
Payer: MEDICARE

## 2024-04-06 VITALS
SYSTOLIC BLOOD PRESSURE: 141 MMHG | WEIGHT: 236.75 LBS | OXYGEN SATURATION: 99 % | TEMPERATURE: 98 F | HEART RATE: 84 BPM | RESPIRATION RATE: 18 BRPM | BODY MASS INDEX: 30.38 KG/M2 | DIASTOLIC BLOOD PRESSURE: 79 MMHG | HEIGHT: 74 IN

## 2024-04-06 DIAGNOSIS — R06.7 SNEEZING: ICD-10-CM

## 2024-04-06 DIAGNOSIS — R05.9 COUGH, UNSPECIFIED TYPE: Primary | ICD-10-CM

## 2024-04-06 DIAGNOSIS — J34.89 RHINORRHEA: ICD-10-CM

## 2024-04-06 LAB
CTP QC/QA: YES
CTP QC/QA: YES
POC MOLECULAR INFLUENZA A AGN: NEGATIVE
POC MOLECULAR INFLUENZA B AGN: NEGATIVE
SARS-COV-2 RDRP RESP QL NAA+PROBE: NEGATIVE

## 2024-04-06 PROCEDURE — 87635 SARS-COV-2 COVID-19 AMP PRB: CPT | Mod: ER | Performed by: NURSE PRACTITIONER

## 2024-04-06 PROCEDURE — 99283 EMERGENCY DEPT VISIT LOW MDM: CPT | Mod: ER

## 2024-04-06 PROCEDURE — 87502 INFLUENZA DNA AMP PROBE: CPT | Mod: ER

## 2024-04-06 RX ORDER — FLUTICASONE PROPIONATE 50 MCG
2 SPRAY, SUSPENSION (ML) NASAL DAILY
Qty: 9.9 ML | Refills: 0 | Status: SHIPPED | OUTPATIENT
Start: 2024-04-06 | End: 2024-04-08

## 2024-04-06 RX ORDER — PROMETHAZINE HYDROCHLORIDE AND DEXTROMETHORPHAN HYDROBROMIDE 6.25; 15 MG/5ML; MG/5ML
5 SYRUP ORAL EVERY 4 HOURS PRN
Qty: 150 ML | Refills: 0 | Status: SHIPPED | OUTPATIENT
Start: 2024-04-06 | End: 2024-04-08

## 2024-04-06 RX ORDER — CETIRIZINE HYDROCHLORIDE 10 MG/1
10 TABLET ORAL DAILY
Qty: 30 TABLET | Refills: 0 | Status: SHIPPED | OUTPATIENT
Start: 2024-04-06 | End: 2024-04-08

## 2024-04-06 NOTE — ED PROVIDER NOTES
Encounter Date: 4/6/2024       History     Chief Complaint   Patient presents with    Cough     With rhinorrhea and nasal congestion since Thursday      Patient presents to ER for cough, onset 2 days ago.  Associated symptoms include sneezing and rhinorrhea.  He has taken over-the-counter [place] cold medication which has provided relief of symptoms.  He denies any known ill contacts.  Symptoms have been intermittent since onset.  He denies fever, chills, generalized body aches, weakness, fatigue, shortness of breath, chest pain, sore throat, ear pain.    The history is provided by the patient.     Review of patient's allergies indicates:  No Known Allergies  Past Medical History:   Diagnosis Date    Anemia, unspecified     Diabetes mellitus     Hypertension     Mixed hyperlipidemia     Renal disorder      Past Surgical History:   Procedure Laterality Date    CHOLECYSTECTOMY      KNEE SURGERY      right     History reviewed. No pertinent family history.  Social History     Tobacco Use    Smoking status: Former    Smokeless tobacco: Never   Substance Use Topics    Alcohol use: Yes     Comment: occas    Drug use: No     Review of Systems   Constitutional:  Negative for chills, fatigue and fever.   HENT:  Positive for rhinorrhea and sneezing. Negative for congestion, ear pain, sinus pressure, sinus pain and sore throat.    Eyes:  Negative for pain.   Respiratory:  Positive for cough. Negative for shortness of breath.    Cardiovascular:  Negative for chest pain.   Gastrointestinal:  Negative for abdominal pain, nausea and vomiting.   Genitourinary:  Negative for dysuria.   Musculoskeletal:  Negative for back pain, myalgias and neck pain.   Skin:  Negative for rash.   Neurological:  Negative for weakness and headaches.       Physical Exam     Initial Vitals [04/06/24 1256]   BP Pulse Resp Temp SpO2   (!) 141/79 84 18 97.9 °F (36.6 °C) 99 %      MAP       --         Physical Exam    Nursing note and vitals  reviewed.  Constitutional: He is not diaphoretic. He is cooperative.  Non-toxic appearance. He does not have a sickly appearance. He does not appear ill. No distress.   HENT:   Head: Normocephalic and atraumatic.   Right Ear: Tympanic membrane and external ear normal.   Left Ear: Tympanic membrane and external ear normal.   Nose: Nose normal.   Mouth/Throat: Oropharynx is clear and moist and mucous membranes are normal. No oropharyngeal exudate, posterior oropharyngeal edema or posterior oropharyngeal erythema.   Eyes: Conjunctivae are normal.   Neck: Neck supple.   Normal range of motion.  Cardiovascular:  Normal rate, regular rhythm and intact distal pulses.           Pulmonary/Chest: Breath sounds normal. No respiratory distress. He has no wheezes. He has no rhonchi. He has no rales.   Abdominal: Abdomen is soft. There is no abdominal tenderness.   Musculoskeletal:         General: Normal range of motion.      Cervical back: Normal range of motion and neck supple.     Neurological: He is alert and oriented to person, place, and time. He has normal strength. GCS score is 15. GCS eye subscore is 4. GCS verbal subscore is 5. GCS motor subscore is 6.   Skin: Skin is warm and dry. Capillary refill takes less than 2 seconds.         ED Course   Procedures  Labs Reviewed   SARS-COV-2 RDRP GENE   POCT INFLUENZA A/B MOLECULAR        Results for orders placed or performed during the hospital encounter of 04/06/24   POCT COVID-19 Rapid Screening   Result Value Ref Range    POC Rapid COVID Negative Negative     Acceptable Yes    POCT Influenza A/B Molecular   Result Value Ref Range    POC Molecular Influenza A Ag Negative Negative, Not Reported    POC Molecular Influenza B Ag Negative Negative, Not Reported     Acceptable Yes          Imaging Results    None          Medications - No data to display  Medical Decision Making  Amount and/or Complexity of Data Reviewed  Labs: ordered.    Risk  OTC  drugs.  Prescription drug management.                     Results reviewed and discussed with patient and he verbalized understanding with no further concerns.  He is nontoxic/non ill-appearing.  Vital signs stable.  Breath sounds clear to auscultation bilaterally.  Respirations are even and nonlabored.  He is in no acute distress.  Cetirizine, promethazine DM, fluticasone Rx provided.  Discussed outpatient follow-up with his PCP.  Discussed signs and symptoms to return to ER.  Patient agrees with plan and voiced no further concerns.  I discussed with patient that evaluation in the ED does not suggest any emergent or life threatening medical conditions requiring immediate intervention beyond what was provided in the ED, and I believe patient is safe for discharge. Regardless, an unremarkable evaluation in the ED does not preclude the development or presence of a serious of life threatening condition. As such, patient was instructed to return immediately for any worsening or change in current symptoms.                   Clinical Impression:  Final diagnoses:  [R05.9] Cough, unspecified type (Primary)  [J34.89] Rhinorrhea  [R06.7] Sneezing          ED Disposition Condition    Discharge Stable          ED Prescriptions       Medication Sig Dispense Start Date End Date Auth. Provider    cetirizine (ZYRTEC) 10 MG tablet Take 1 tablet (10 mg total) by mouth once daily. 30 tablet 4/6/2024 5/6/2024 Rodríguez Case NP    promethazine-dextromethorphan (PROMETHAZINE-DM) 6.25-15 mg/5 mL Syrp Take 5 mLs by mouth every 4 (four) hours as needed (cough). 150 mL 4/6/2024 4/11/2024 Rodríguez Case NP    fluticasone propionate (FLONASE) 50 mcg/actuation nasal spray 2 sprays (100 mcg total) by Each Nostril route once daily. 9.9 mL 4/6/2024 5/6/2024 Rodríguez Case NP          Follow-up Information       Follow up With Specialties Details Why Contact Info    Rigoberto Champion MD Internal Medicine In 2 days  [address] [address]  [facility] [address]  [phone]      [facility] - Emergency Dept Emergency Medicine  As needed, If symptoms worsen [address] [facility] [address]  [facility] [address] [phone]             Rodríguez Case, NP  04/06/24 [id number]

## 2024-04-08 RX ORDER — FLUTICASONE PROPIONATE 50 MCG
2 SPRAY, SUSPENSION (ML) NASAL DAILY
Qty: 9.9 ML | Refills: 0 | Status: SHIPPED | OUTPATIENT
Start: 2024-04-08 | End: 2024-05-08

## 2024-04-08 RX ORDER — PROMETHAZINE HYDROCHLORIDE AND DEXTROMETHORPHAN HYDROBROMIDE 6.25; 15 MG/5ML; MG/5ML
5 SYRUP ORAL EVERY 4 HOURS PRN
Qty: 150 ML | Refills: 0 | Status: SHIPPED | OUTPATIENT
Start: 2024-04-08 | End: 2024-04-13

## 2024-04-08 RX ORDER — CETIRIZINE HYDROCHLORIDE 10 MG/1
10 TABLET ORAL DAILY
Qty: 30 TABLET | Refills: 0 | Status: SHIPPED | OUTPATIENT
Start: 2024-04-08 | End: 2024-05-08

## 2024-06-13 NOTE — ED NOTES
Patient placed on angio table with some assistance. IR Staff and anesthesia present at bedside. Anesthesia to remain at bedside to manage pt airway, medications, VS and pt status.     Patient verbally verified and Spelled Full Name and Date of Birth.

## 2024-07-09 ENCOUNTER — LAB VISIT (OUTPATIENT)
Dept: LAB | Facility: HOSPITAL | Age: 64
End: 2024-07-09
Attending: INTERNAL MEDICINE
Payer: MEDICARE

## 2024-07-09 DIAGNOSIS — E55.9 AVITAMINOSIS D: ICD-10-CM

## 2024-07-09 DIAGNOSIS — N18.4 CHRONIC KIDNEY DISEASE, STAGE IV (SEVERE): ICD-10-CM

## 2024-07-09 LAB
ALBUMIN SERPL BCP-MCNC: 3.5 G/DL (ref 3.5–5.2)
ANION GAP SERPL CALC-SCNC: 7 MMOL/L (ref 8–16)
BASOPHILS # BLD AUTO: 0.01 K/UL (ref 0–0.2)
BASOPHILS NFR BLD: 0.3 % (ref 0–1.9)
BUN SERPL-MCNC: 42 MG/DL (ref 8–23)
CALCIUM SERPL-MCNC: 9.3 MG/DL (ref 8.7–10.5)
CHLORIDE SERPL-SCNC: 112 MMOL/L (ref 95–110)
CO2 SERPL-SCNC: 19 MMOL/L (ref 23–29)
CREAT SERPL-MCNC: 3.6 MG/DL (ref 0.5–1.4)
DIFFERENTIAL METHOD BLD: ABNORMAL
EOSINOPHIL # BLD AUTO: 0.1 K/UL (ref 0–0.5)
EOSINOPHIL NFR BLD: 4.1 % (ref 0–8)
ERYTHROCYTE [DISTWIDTH] IN BLOOD BY AUTOMATED COUNT: 12.4 % (ref 11.5–14.5)
EST. GFR  (NO RACE VARIABLE): 18.3 ML/MIN/1.73 M^2
GLUCOSE SERPL-MCNC: 99 MG/DL (ref 70–110)
HCT VFR BLD AUTO: 33.8 % (ref 40–54)
HGB BLD-MCNC: 11.2 G/DL (ref 14–18)
IMM GRANULOCYTES # BLD AUTO: 0.01 K/UL (ref 0–0.04)
IMM GRANULOCYTES NFR BLD AUTO: 0.3 % (ref 0–0.5)
LYMPHOCYTES # BLD AUTO: 0.7 K/UL (ref 1–4.8)
LYMPHOCYTES NFR BLD: 21.2 % (ref 18–48)
MCH RBC QN AUTO: 29.2 PG (ref 27–31)
MCHC RBC AUTO-ENTMCNC: 33.1 G/DL (ref 32–36)
MCV RBC AUTO: 88 FL (ref 82–98)
MONOCYTES # BLD AUTO: 0.4 K/UL (ref 0.3–1)
MONOCYTES NFR BLD: 11.3 % (ref 4–15)
NEUTROPHILS # BLD AUTO: 2.2 K/UL (ref 1.8–7.7)
NEUTROPHILS NFR BLD: 62.8 % (ref 38–73)
NRBC BLD-RTO: 0 /100 WBC
PHOSPHATE SERPL-MCNC: 3.9 MG/DL (ref 2.7–4.5)
PLATELET # BLD AUTO: 146 K/UL (ref 150–450)
PMV BLD AUTO: 9.6 FL (ref 9.2–12.9)
POTASSIUM SERPL-SCNC: 5.7 MMOL/L (ref 3.5–5.1)
RBC # BLD AUTO: 3.83 M/UL (ref 4.6–6.2)
SODIUM SERPL-SCNC: 138 MMOL/L (ref 136–145)
WBC # BLD AUTO: 3.44 K/UL (ref 3.9–12.7)

## 2024-07-09 PROCEDURE — 36415 COLL VENOUS BLD VENIPUNCTURE: CPT | Mod: PO | Performed by: INTERNAL MEDICINE

## 2024-07-09 PROCEDURE — 83970 ASSAY OF PARATHORMONE: CPT | Performed by: INTERNAL MEDICINE

## 2024-07-09 PROCEDURE — 82306 VITAMIN D 25 HYDROXY: CPT | Performed by: INTERNAL MEDICINE

## 2024-07-09 PROCEDURE — 80069 RENAL FUNCTION PANEL: CPT | Mod: PO | Performed by: INTERNAL MEDICINE

## 2024-07-09 PROCEDURE — 85025 COMPLETE CBC W/AUTO DIFF WBC: CPT | Mod: PO | Performed by: INTERNAL MEDICINE

## 2024-07-10 LAB
25(OH)D3+25(OH)D2 SERPL-MCNC: 33 NG/ML (ref 30–96)
PTH-INTACT SERPL-MCNC: 249.6 PG/ML (ref 9–77)

## 2024-08-06 ENCOUNTER — HOSPITAL ENCOUNTER (INPATIENT)
Facility: HOSPITAL | Age: 64
LOS: 3 days | Discharge: HOME | DRG: 281 | End: 2024-08-09
Attending: EMERGENCY MEDICINE | Admitting: HOSPITALIST
Payer: MEDICARE

## 2024-08-06 DIAGNOSIS — E11.9 TYPE 2 DIABETES MELLITUS WITHOUT COMPLICATION, WITHOUT LONG-TERM CURRENT USE OF INSULIN: ICD-10-CM

## 2024-08-06 DIAGNOSIS — D72.819 LEUKOPENIA, UNSPECIFIED TYPE: ICD-10-CM

## 2024-08-06 DIAGNOSIS — I21.4 NSTEMI (NON-ST ELEVATION MYOCARDIAL INFARCTION): ICD-10-CM

## 2024-08-06 DIAGNOSIS — R07.89 CHEST PRESSURE: ICD-10-CM

## 2024-08-06 DIAGNOSIS — I21.4 NON-ST ELEVATION MYOCARDIAL INFARCTION (NSTEMI): ICD-10-CM

## 2024-08-06 DIAGNOSIS — I21.4 NSTEMI (NON-ST ELEVATED MYOCARDIAL INFARCTION): Primary | ICD-10-CM

## 2024-08-06 DIAGNOSIS — N18.9 CHRONIC KIDNEY DISEASE, UNSPECIFIED CKD STAGE: ICD-10-CM

## 2024-08-06 DIAGNOSIS — D63.1 ANEMIA DUE TO CHRONIC KIDNEY DISEASE, UNSPECIFIED CKD STAGE: ICD-10-CM

## 2024-08-06 DIAGNOSIS — N18.9 ANEMIA DUE TO CHRONIC KIDNEY DISEASE, UNSPECIFIED CKD STAGE: ICD-10-CM

## 2024-08-06 PROBLEM — R07.9 CHEST PAIN: Status: ACTIVE | Noted: 2024-08-06

## 2024-08-06 LAB
ABO + RH BLD: NORMAL
ALBUMIN SERPL BCP-MCNC: 3.6 G/DL (ref 3.5–5.2)
ALP SERPL-CCNC: 84 U/L (ref 55–135)
ALT SERPL W/O P-5'-P-CCNC: 39 U/L (ref 10–44)
ANION GAP SERPL CALC-SCNC: 9 MMOL/L (ref 8–16)
APTT PPP: 25.1 SEC (ref 21–32)
AST SERPL-CCNC: 32 U/L (ref 10–40)
BASOPHILS # BLD AUTO: 0.03 K/UL (ref 0–0.2)
BASOPHILS NFR BLD: 0.8 % (ref 0–1.9)
BILIRUB SERPL-MCNC: 0.4 MG/DL (ref 0.1–1)
BLD GP AB SCN CELLS X3 SERPL QL: NORMAL
BNP SERPL-MCNC: 78 PG/ML (ref 0–99)
BUN SERPL-MCNC: 46 MG/DL (ref 8–23)
CALCIUM SERPL-MCNC: 9.5 MG/DL (ref 8.7–10.5)
CHLORIDE SERPL-SCNC: 108 MMOL/L (ref 95–110)
CK SERPL-CCNC: 309 U/L (ref 20–200)
CO2 SERPL-SCNC: 21 MMOL/L (ref 23–29)
CREAT SERPL-MCNC: 4.3 MG/DL (ref 0.5–1.4)
DIFFERENTIAL METHOD BLD: ABNORMAL
EOSINOPHIL # BLD AUTO: 0.1 K/UL (ref 0–0.5)
EOSINOPHIL NFR BLD: 3.1 % (ref 0–8)
ERYTHROCYTE [DISTWIDTH] IN BLOOD BY AUTOMATED COUNT: 12 % (ref 11.5–14.5)
EST. GFR  (NO RACE VARIABLE): 14.8 ML/MIN/1.73 M^2
GLUCOSE SERPL-MCNC: 202 MG/DL (ref 70–110)
HCT VFR BLD AUTO: 32 % (ref 40–54)
HEP C VIRUS HOLD SPECIMEN: NORMAL
HGB BLD-MCNC: 10.9 G/DL (ref 14–18)
IMM GRANULOCYTES # BLD AUTO: 0 K/UL (ref 0–0.04)
IMM GRANULOCYTES NFR BLD AUTO: 0 % (ref 0–0.5)
INR PPP: 1 (ref 0.8–1.2)
LYMPHOCYTES # BLD AUTO: 0.8 K/UL (ref 1–4.8)
LYMPHOCYTES NFR BLD: 23.4 % (ref 18–48)
MCH RBC QN AUTO: 29.9 PG (ref 27–31)
MCHC RBC AUTO-ENTMCNC: 34.1 G/DL (ref 32–36)
MCV RBC AUTO: 88 FL (ref 82–98)
MONOCYTES # BLD AUTO: 0.4 K/UL (ref 0.3–1)
MONOCYTES NFR BLD: 11.9 % (ref 4–15)
NEUTROPHILS # BLD AUTO: 2.2 K/UL (ref 1.8–7.7)
NEUTROPHILS NFR BLD: 60.8 % (ref 38–73)
NRBC BLD-RTO: 0 /100 WBC
PLATELET # BLD AUTO: 155 K/UL (ref 150–450)
PMV BLD AUTO: 9.3 FL (ref 9.2–12.9)
POTASSIUM SERPL-SCNC: 4.4 MMOL/L (ref 3.5–5.1)
PROT SERPL-MCNC: 7.3 G/DL (ref 6–8.4)
PROTHROMBIN TIME: 10.9 SEC (ref 9–12.5)
RBC # BLD AUTO: 3.65 M/UL (ref 4.6–6.2)
SODIUM SERPL-SCNC: 138 MMOL/L (ref 136–145)
SPECIMEN OUTDATE: NORMAL
TROPONIN I SERPL DL<=0.01 NG/ML-MCNC: 0.98 NG/ML (ref 0–0.03)
TROPONIN I SERPL DL<=0.01 NG/ML-MCNC: 2.89 NG/ML (ref 0–0.03)
WBC # BLD AUTO: 3.54 K/UL (ref 3.9–12.7)

## 2024-08-06 PROCEDURE — 99291 CRITICAL CARE FIRST HOUR: CPT | Mod: ER

## 2024-08-06 PROCEDURE — 85025 COMPLETE CBC W/AUTO DIFF WBC: CPT | Mod: ER | Performed by: EMERGENCY MEDICINE

## 2024-08-06 PROCEDURE — 80061 LIPID PANEL: CPT | Performed by: HOSPITALIST

## 2024-08-06 PROCEDURE — 87389 HIV-1 AG W/HIV-1&-2 AB AG IA: CPT | Performed by: EMERGENCY MEDICINE

## 2024-08-06 PROCEDURE — 82550 ASSAY OF CK (CPK): CPT | Mod: ER | Performed by: EMERGENCY MEDICINE

## 2024-08-06 PROCEDURE — 93010 ELECTROCARDIOGRAM REPORT: CPT | Mod: ,,, | Performed by: INTERNAL MEDICINE

## 2024-08-06 PROCEDURE — 84484 ASSAY OF TROPONIN QUANT: CPT | Performed by: HOSPITALIST

## 2024-08-06 PROCEDURE — 85730 THROMBOPLASTIN TIME PARTIAL: CPT | Mod: ER | Performed by: EMERGENCY MEDICINE

## 2024-08-06 PROCEDURE — 80053 COMPREHEN METABOLIC PANEL: CPT | Mod: ER | Performed by: EMERGENCY MEDICINE

## 2024-08-06 PROCEDURE — 86850 RBC ANTIBODY SCREEN: CPT | Performed by: HOSPITALIST

## 2024-08-06 PROCEDURE — 63600175 PHARM REV CODE 636 W HCPCS: Mod: ER | Performed by: EMERGENCY MEDICINE

## 2024-08-06 PROCEDURE — 93005 ELECTROCARDIOGRAM TRACING: CPT | Mod: ER

## 2024-08-06 PROCEDURE — 84484 ASSAY OF TROPONIN QUANT: CPT | Mod: 91,ER | Performed by: EMERGENCY MEDICINE

## 2024-08-06 PROCEDURE — 86900 BLOOD TYPING SEROLOGIC ABO: CPT | Performed by: HOSPITALIST

## 2024-08-06 PROCEDURE — 21400001 HC TELEMETRY ROOM

## 2024-08-06 PROCEDURE — 83036 HEMOGLOBIN GLYCOSYLATED A1C: CPT | Performed by: HOSPITALIST

## 2024-08-06 PROCEDURE — 83880 ASSAY OF NATRIURETIC PEPTIDE: CPT | Mod: ER | Performed by: EMERGENCY MEDICINE

## 2024-08-06 PROCEDURE — 86901 BLOOD TYPING SEROLOGIC RH(D): CPT | Performed by: HOSPITALIST

## 2024-08-06 PROCEDURE — 86803 HEPATITIS C AB TEST: CPT | Performed by: EMERGENCY MEDICINE

## 2024-08-06 PROCEDURE — 85610 PROTHROMBIN TIME: CPT | Mod: ER | Performed by: EMERGENCY MEDICINE

## 2024-08-06 RX ORDER — CLOPIDOGREL BISULFATE 75 MG/1
75 TABLET ORAL DAILY
Status: DISCONTINUED | OUTPATIENT
Start: 2024-08-07 | End: 2024-08-09 | Stop reason: HOSPADM

## 2024-08-06 RX ORDER — GLUCAGON 1 MG
1 KIT INJECTION
Status: DISCONTINUED | OUTPATIENT
Start: 2024-08-06 | End: 2024-08-09 | Stop reason: HOSPADM

## 2024-08-06 RX ORDER — SODIUM CHLORIDE 9 MG/ML
INJECTION, SOLUTION INTRAVENOUS CONTINUOUS
Status: ACTIVE | OUTPATIENT
Start: 2024-08-07 | End: 2024-08-07

## 2024-08-06 RX ORDER — NAPROXEN SODIUM 220 MG/1
81 TABLET, FILM COATED ORAL DAILY
Status: DISCONTINUED | OUTPATIENT
Start: 2024-08-07 | End: 2024-08-09 | Stop reason: HOSPADM

## 2024-08-06 RX ORDER — IBUPROFEN 200 MG
24 TABLET ORAL
Status: DISCONTINUED | OUTPATIENT
Start: 2024-08-06 | End: 2024-08-09 | Stop reason: HOSPADM

## 2024-08-06 RX ORDER — HEPARIN SODIUM,PORCINE/D5W 25000/250
0-40 INTRAVENOUS SOLUTION INTRAVENOUS CONTINUOUS
Status: DISCONTINUED | OUTPATIENT
Start: 2024-08-06 | End: 2024-08-09

## 2024-08-06 RX ORDER — IBUPROFEN 200 MG
16 TABLET ORAL
Status: DISCONTINUED | OUTPATIENT
Start: 2024-08-06 | End: 2024-08-09 | Stop reason: HOSPADM

## 2024-08-06 RX ORDER — INSULIN ASPART 100 [IU]/ML
0-5 INJECTION, SOLUTION INTRAVENOUS; SUBCUTANEOUS
Status: DISCONTINUED | OUTPATIENT
Start: 2024-08-06 | End: 2024-08-09 | Stop reason: HOSPADM

## 2024-08-06 RX ORDER — NITROGLYCERIN 0.4 MG/1
0.4 TABLET SUBLINGUAL EVERY 5 MIN PRN
Status: DISCONTINUED | OUTPATIENT
Start: 2024-08-06 | End: 2024-08-08 | Stop reason: SDUPTHER

## 2024-08-06 RX ADMIN — HEPARIN SODIUM 12 UNITS/KG/HR: 10000 INJECTION, SOLUTION INTRAVENOUS at 08:08

## 2024-08-06 NOTE — ED PROVIDER NOTES
"Encounter Date: 8/6/2024       History     Chief Complaint   Patient presents with    Chest Pain     Described as "when you drink something really cold" in center of chest x a few days. Intermittent. Current episode began a few hours ago.      The history is provided by the patient.   Chest Pain  The current episode started several days ago. Chest pain occurs constantly. The chest pain is unchanged. Pertinent negatives for primary symptoms include no fever, no fatigue, no shortness of breath, no cough, no nausea and no vomiting.   Pertinent negatives for associated symptoms include no numbness and no weakness.     Review of patient's allergies indicates:  No Known Allergies  Past Medical History:   Diagnosis Date    Anemia, unspecified     Diabetes mellitus     Hypertension     Mixed hyperlipidemia     Renal disorder      Past Surgical History:   Procedure Laterality Date    CHOLECYSTECTOMY      KNEE SURGERY      right     No family history on file.  Social History     Tobacco Use    Smoking status: Former    Smokeless tobacco: Never   Substance Use Topics    Alcohol use: Yes     Comment: occas    Drug use: No     Review of Systems   Constitutional:  Negative for chills, fatigue and fever.   HENT:  Negative for congestion.    Respiratory:  Negative for cough and shortness of breath.    Cardiovascular:  Positive for chest pain.   Gastrointestinal:  Negative for diarrhea, nausea and vomiting.   Genitourinary:  Negative for dysuria.   Neurological:  Negative for weakness and numbness.       Physical Exam     Initial Vitals [08/06/24 1734]   BP Pulse Resp Temp SpO2   139/70 75 18 97.9 °F (36.6 °C) 99 %      MAP       --         Physical Exam    Constitutional: He appears well-developed and well-nourished. No distress.   HENT:   Head: Normocephalic and atraumatic.   Eyes: Conjunctivae are normal. Pupils are equal, round, and reactive to light.   Neck: Neck supple.   Normal range of motion.  Cardiovascular:  Normal rate, " regular rhythm and normal heart sounds.           Pulmonary/Chest: Breath sounds normal.   Abdominal: Abdomen is soft. Bowel sounds are normal.   Musculoskeletal:         General: Normal range of motion.      Cervical back: Normal range of motion and neck supple.     Neurological: He is alert and oriented to person, place, and time. No cranial nerve deficit.   Skin: Skin is warm and dry.   Psychiatric: He has a normal mood and affect.         ED Course   Critical Care    Date/Time: 8/6/2024 7:27 PM    Performed by: Tammi Graves DO  Authorized by: Tammi Graves DO  Direct patient critical care time: 10 minutes  Ordering / reviewing critical care time: 10 minutes  Documentation critical care time: 10 minutes  Consulting other physicians critical care time: 10 minutes  Total critical care time (exclusive of procedural time) : 40 minutes  Critical care time was exclusive of separately billable procedures and treating other patients and teaching time.  Critical care was necessary to treat or prevent imminent or life-threatening deterioration of the following conditions: NSTEMI.  Critical care was time spent personally by me on the following activities: development of treatment plan with patient or surrogate, discussions with consultants, evaluation of patient's response to treatment, examination of patient, obtaining history from patient or surrogate, ordering and performing treatments and interventions, ordering and review of laboratory studies, ordering and review of radiographic studies, pulse oximetry and re-evaluation of patient's condition.        Labs Reviewed   CBC W/ AUTO DIFFERENTIAL - Abnormal       Result Value    WBC 3.54 (*)     RBC 3.65 (*)     Hemoglobin 10.9 (*)     Hematocrit 32.0 (*)     MCV 88      MCH 29.9      MCHC 34.1      RDW 12.0      Platelets 155      MPV 9.3      Immature Granulocytes 0.0      Gran # (ANC) 2.2      Immature Grans (Abs) 0.00      Lymph # 0.8 (*)     Mono # 0.4       Eos # 0.1      Baso # 0.03      nRBC 0      Gran % 60.8      Lymph % 23.4      Mono % 11.9      Eosinophil % 3.1      Basophil % 0.8      Differential Method Automated      Narrative:     Release to patient->Immediate   COMPREHENSIVE METABOLIC PANEL - Abnormal    Sodium 138      Potassium 4.4      Chloride 108      CO2 21 (*)     Glucose 202 (*)     BUN 46 (*)     Creatinine 4.3 (*)     Calcium 9.5      Total Protein 7.3      Albumin 3.6      Total Bilirubin 0.4      Alkaline Phosphatase 84      AST 32      ALT 39      eGFR 14.8 (*)     Anion Gap 9      Narrative:     Release to patient->Immediate   CK - Abnormal     (*)     Narrative:     Release to patient->Immediate   TROPONIN I - Abnormal    Troponin I 0.979 (*)     Narrative:     Release to patient->Immediate   B-TYPE NATRIURETIC PEPTIDE    BNP 78      Narrative:     Release to patient->Immediate   HIV 1 / 2 ANTIBODY   HEPATITIS C ANTIBODY   HEP C VIRUS HOLD SPECIMEN   URINALYSIS, REFLEX TO URINE CULTURE   APTT   PROTIME-INR     EKG Readings: (Independently Interpreted)   Rhythm: Normal Sinus Rhythm. Heart Rate: 69. Ectopy: No Ectopy. Conduction: Normal. ST Segments: Normal ST Segments. T Waves: Normal. Clinical Impression: Normal Sinus Rhythm       Imaging Results              X-Ray Chest AP Portable (Final result)  Result time 08/06/24 18:52:02      Final result by Jana Andersen MD (08/06/24 18:52:02)                   Impression:      X no active or adverse finding      Electronically signed by: Jana Andersen  Date:    08/06/2024  Time:    18:52               Narrative:    EXAMINATION:  XR CHEST AP PORTABLE    CLINICAL HISTORY:  chest pain;    TECHNIQUE:  Single frontal portable view of the chest was performed.    COMPARISON:  09/01/2023    FINDINGS:  The no new pulmonary consolidation pleural effusion or convincing pneumothorax                                       Medications   heparin 25,000 units in dextrose 5% (100 units/ml) IV bolus  from bag LOW INTENSITY nomogram - [facility] (has no administration in time range)   heparin 25,000 units in dextrose 5% 250 mL (100 units/mL) infusion LOW INTENSITY nomogram - [facility] (has no administration in time range)   heparin 25,000 units in dextrose 5% (100 units/ml) IV bolus from bag LOW INTENSITY nomogram - [facility] (has no administration in time range)   heparin 25,000 units in dextrose 5% (100 units/ml) IV bolus from bag LOW INTENSITY nomogram - [facility] (has no administration in time range)     Medical Decision Making  6:00 PM: Dr Graves assumed care awaiting labs and imaging.     7:22 PM:  Discussed with Dr. Kim (cardiology) who recommends admission to hospital medicine for heparin infusion, echocardiogram, and trend troponin.  Not a candidate for left heart catheterization due to renal function.    7:40 PM: Discussed with Dr Chang (Hospital Medicine) who accepted patient transfer for inpatient admission to telemetry.    63-year-old male with a history of CKD, diabetes, hypertension, and hyperlipidemia who presents with chest pain for 2 days.  Chest pain-free currently.  Associated symptoms include lightheadedness, dizziness, and diaphoresis.  Denies shortness of breath, nausea, vomiting.  EKG shows sinus rhythm with first-degree AV block and T-wave inversions in the inferolateral leads similar to EKG in January.  Troponin elevated at 0.979.  CMP shows CKD.  BN P normal and chest x-ray negative for CHF.  CPK slightly elevated.  CBC shows chronic anemia.  Heart score 6.  He had an abnormal stress test in January.            Amount and/or Complexity of Data Reviewed  Labs: ordered.  Radiology: ordered.    Risk  Prescription drug management.  Decision regarding hospitalization.      Additional MDM:   Differential Diagnosis:   Differential diagnosis includes but is not limited to ACS, CKD, CHF anemia, pneumonia, pneumothorax.      Heart Score:    History:          Slightly suspicious.  ECG:             Nonspecific  repolarisation disturbance  Age:               45-65 years  Risk factors: >= 3 risk factors or history of atherosclerotic disease  Troponin:       >2x normal limit  Heart Score = 6                ED Course as of 08/06/24 1945   Tue Aug 06, 2024   1856 NM stress Jan 2024:  ·  Abnormal myocardial perfusion scan.  ·  There is a mild intensity, small sized, mostly fixed perfusion abnormality with some reversibilty in the apical wall(s).  ·  There are no other significant perfusion abnormalities.  ·  The gated perfusion images showed an ejection fraction of 36% at rest. The gated perfusion images showed an ejection fraction of 40% post stress.  ·  The ECG portion of the study is negative for ischemia.  ·  The patient reported no chest pain during the stress test.  ·  During stress, rare PVCs are noted.  ·  The patient was infused intravenously with regadenoson at 0.08 mg/ml for a total duration of 10 seconds. A total regadenoson dose of 0.4 mg was injected.   [NF]      ED Course User Index  [NF] Tammi Graves DO                           Clinical Impression:  Final diagnoses:  [R07.89] Chest pressure  [I21.4] NSTEMI (non-ST elevated myocardial infarction) (Primary)  [N18.9] Chronic kidney disease, unspecified CKD stage  [N18.9, D63.1] Anemia due to chronic kidney disease, unspecified CKD stage  [D72.819] Leukopenia, unspecified type          ED Disposition Condition    Admit Stable                Tammi Graves DO  08/06/24 1945

## 2024-08-06 NOTE — Clinical Note
Diagnosis: NSTEMI (non-ST elevated myocardial infarction) [657774]   Future Attending Provider: RAHAT CALLOWAY [[id number]]   Reason for IP Medical Treatment  (Clinical interventions that can only be accomplished in the IP setting? ) :: NSTEMI

## 2024-08-07 PROBLEM — E11.9 TYPE 2 DIABETES MELLITUS: Status: ACTIVE | Noted: 2024-08-07

## 2024-08-07 PROBLEM — N18.9 ACUTE KIDNEY INJURY SUPERIMPOSED ON CHRONIC KIDNEY DISEASE: Status: ACTIVE | Noted: 2024-08-07

## 2024-08-07 PROBLEM — I10 HYPERTENSION: Status: ACTIVE | Noted: 2024-08-07

## 2024-08-07 PROBLEM — E78.5 HLD (HYPERLIPIDEMIA): Status: ACTIVE | Noted: 2024-08-07

## 2024-08-07 PROBLEM — D64.9 ANEMIA: Status: ACTIVE | Noted: 2024-08-07

## 2024-08-07 PROBLEM — N17.9 ACUTE KIDNEY INJURY SUPERIMPOSED ON CHRONIC KIDNEY DISEASE: Status: ACTIVE | Noted: 2024-08-07

## 2024-08-07 LAB
ANION GAP SERPL CALC-SCNC: 11 MMOL/L (ref 8–16)
AORTIC ROOT ANNULUS: 3.55 CM
APICAL FOUR CHAMBER EJECTION FRACTION: 41 %
APTT PPP: 111.3 SEC (ref 21–32)
APTT PPP: 26.5 SEC (ref 21–32)
APTT PPP: 49 SEC (ref 21–32)
APTT PPP: 82.6 SEC (ref 21–32)
ASCENDING AORTA: 3.4 CM
AV INDEX (PROSTH): 0.63
AV MEAN GRADIENT: 7 MMHG
AV PEAK GRADIENT: 11 MMHG
AV REGURGITATION PRESSURE HALF TIME: 839.51 MS
AV VALVE AREA BY VELOCITY RATIO: 2.41 CM²
AV VALVE AREA: 2.45 CM²
AV VELOCITY RATIO: 0.62
BASOPHILS # BLD AUTO: 0.02 K/UL (ref 0–0.2)
BASOPHILS NFR BLD: 0.5 % (ref 0–1.9)
BSA FOR ECHO PROCEDURE: 2.34 M2
BUN SERPL-MCNC: 49 MG/DL (ref 8–23)
CALCIUM SERPL-MCNC: 9.1 MG/DL (ref 8.7–10.5)
CHLORIDE SERPL-SCNC: 106 MMOL/L (ref 95–110)
CHOLEST SERPL-MCNC: 98 MG/DL (ref 120–199)
CHOLEST/HDLC SERPL: 2.7 {RATIO} (ref 2–5)
CO2 SERPL-SCNC: 21 MMOL/L (ref 23–29)
CREAT SERPL-MCNC: 3.6 MG/DL (ref 0.5–1.4)
CV ECHO LV RWT: 0.61 CM
DIFFERENTIAL METHOD BLD: ABNORMAL
DOP CALC AO PEAK VEL: 1.64 M/S
DOP CALC AO VTI: 30.3 CM
DOP CALC LVOT AREA: 3.9 CM2
DOP CALC LVOT DIAMETER: 2.22 CM
DOP CALC LVOT PEAK VEL: 1.02 M/S
DOP CALC LVOT STROKE VOLUME: 74.28 CM3
DOP CALC RVOT PEAK VEL: 0.74 M/S
DOP CALC RVOT VTI: 14.7 CM
DOP CALCLVOT PEAK VEL VTI: 19.2 CM
E WAVE DECELERATION TIME: 203.43 MSEC
E/A RATIO: 0.63
E/E' RATIO: 8.5 M/S
ECHO LV POSTERIOR WALL: 1.58 CM (ref 0.6–1.1)
EJECTION FRACTION: 35 %
EOSINOPHIL # BLD AUTO: 0.1 K/UL (ref 0–0.5)
EOSINOPHIL NFR BLD: 3.7 % (ref 0–8)
ERYTHROCYTE [DISTWIDTH] IN BLOOD BY AUTOMATED COUNT: 12 % (ref 11.5–14.5)
EST. GFR  (NO RACE VARIABLE): 18 ML/MIN/1.73 M^2
ESTIMATED AVG GLUCOSE: 151 MG/DL (ref 68–131)
FRACTIONAL SHORTENING: 19 % (ref 28–44)
GLUCOSE SERPL-MCNC: 234 MG/DL (ref 70–110)
HBA1C MFR BLD: 6.9 % (ref 4–5.6)
HCT VFR BLD AUTO: 32 % (ref 40–54)
HCV AB SERPL QL IA: NEGATIVE
HDLC SERPL-MCNC: 36 MG/DL (ref 40–75)
HDLC SERPL: 36.7 % (ref 20–50)
HGB BLD-MCNC: 10.5 G/DL (ref 14–18)
HIV 1+2 AB+HIV1 P24 AG SERPL QL IA: NEGATIVE
IMM GRANULOCYTES # BLD AUTO: 0.01 K/UL (ref 0–0.04)
IMM GRANULOCYTES NFR BLD AUTO: 0.3 % (ref 0–0.5)
INTERVENTRICULAR SEPTUM: 1.53 CM (ref 0.6–1.1)
IVC DIAMETER: 1.52 CM
IVRT: 102.76 MSEC
LA MAJOR: 5.16 CM
LA MINOR: 4.98 CM
LA WIDTH: 3.7 CM
LDLC SERPL CALC-MCNC: 51.6 MG/DL (ref 63–159)
LEFT ATRIUM SIZE: 3.67 CM
LEFT ATRIUM VOLUME INDEX: 25.3 ML/M2
LEFT ATRIUM VOLUME: 58.5 CM3
LEFT INTERNAL DIMENSION IN SYSTOLE: 4.17 CM (ref 2.1–4)
LEFT VENTRICLE DIASTOLIC VOLUME INDEX: 54.88 ML/M2
LEFT VENTRICLE DIASTOLIC VOLUME: 126.77 ML
LEFT VENTRICLE END DIASTOLIC VOLUME APICAL 4 CHAMBER: 95.54 ML
LEFT VENTRICLE MASS INDEX: 154 G/M2
LEFT VENTRICLE SYSTOLIC VOLUME INDEX: 33.5 ML/M2
LEFT VENTRICLE SYSTOLIC VOLUME: 77.43 ML
LEFT VENTRICULAR INTERNAL DIMENSION IN DIASTOLE: 5.15 CM (ref 3.5–6)
LEFT VENTRICULAR MASS: 355.84 G
LV LATERAL E/E' RATIO: 7.29 M/S
LV SEPTAL E/E' RATIO: 10.2 M/S
LVED V (TEICH): 126.77 ML
LVES V (TEICH): 77.43 ML
LVOT MG: 2.41 MMHG
LVOT MV: 0.73 CM/S
LYMPHOCYTES # BLD AUTO: 1 K/UL (ref 1–4.8)
LYMPHOCYTES NFR BLD: 26.6 % (ref 18–48)
MCH RBC QN AUTO: 29.5 PG (ref 27–31)
MCHC RBC AUTO-ENTMCNC: 32.8 G/DL (ref 32–36)
MCV RBC AUTO: 90 FL (ref 82–98)
MONOCYTES # BLD AUTO: 0.4 K/UL (ref 0.3–1)
MONOCYTES NFR BLD: 10.4 % (ref 4–15)
MV PEAK A VEL: 0.81 M/S
MV PEAK E VEL: 0.51 M/S
MV STENOSIS PRESSURE HALF TIME: 58.99 MS
MV VALVE AREA P 1/2 METHOD: 3.73 CM2
NEUTROPHILS # BLD AUTO: 2.2 K/UL (ref 1.8–7.7)
NEUTROPHILS NFR BLD: 58.5 % (ref 38–73)
NONHDLC SERPL-MCNC: 62 MG/DL
NRBC BLD-RTO: 0 /100 WBC
OHS QRS DURATION: 84 MS
OHS QRS DURATION: 84 MS
OHS QTC CALCULATION: 407 MS
OHS QTC CALCULATION: 425 MS
PISA AR MAX VEL: 3.93 M/S
PISA MRMAX VEL: 2.65 M/S
PISA TR MAX VEL: 1.97 M/S
PLATELET # BLD AUTO: 137 K/UL (ref 150–450)
PMV BLD AUTO: 9.2 FL (ref 9.2–12.9)
POCT GLUCOSE: 152 MG/DL (ref 70–110)
POCT GLUCOSE: 193 MG/DL (ref 70–110)
POCT GLUCOSE: 206 MG/DL (ref 70–110)
POCT GLUCOSE: 79 MG/DL (ref 70–110)
POTASSIUM SERPL-SCNC: 4.4 MMOL/L (ref 3.5–5.1)
PV MEAN GRADIENT: 1 MMHG
RA MAJOR: 4.29 CM
RA PRESSURE ESTIMATED: 3 MMHG
RA WIDTH: 2.8 CM
RBC # BLD AUTO: 3.56 M/UL (ref 4.6–6.2)
RV TB RVSP: 5 MMHG
SODIUM SERPL-SCNC: 138 MMOL/L (ref 136–145)
STJ: 3.45 CM
TDI LATERAL: 0.07 M/S
TDI SEPTAL: 0.05 M/S
TDI: 0.06 M/S
TR MAX PG: 16 MMHG
TRICUSPID ANNULAR PLANE SYSTOLIC EXCURSION: 1.86 CM
TRIGL SERPL-MCNC: 52 MG/DL (ref 30–150)
TROPONIN I SERPL DL<=0.01 NG/ML-MCNC: 5.16 NG/ML (ref 0–0.03)
TROPONIN I SERPL DL<=0.01 NG/ML-MCNC: 6.1 NG/ML (ref 0–0.03)
TV REST PULMONARY ARTERY PRESSURE: 19 MMHG
WBC # BLD AUTO: 3.83 K/UL (ref 3.9–12.7)
Z-SCORE OF LEFT VENTRICULAR DIMENSION IN END DIASTOLE: -5.67
Z-SCORE OF LEFT VENTRICULAR DIMENSION IN END SYSTOLE: -2.15

## 2024-08-07 PROCEDURE — 85730 THROMBOPLASTIN TIME PARTIAL: CPT | Mod: 91 | Performed by: EMERGENCY MEDICINE

## 2024-08-07 PROCEDURE — 36415 COLL VENOUS BLD VENIPUNCTURE: CPT | Mod: XB | Performed by: HOSPITALIST

## 2024-08-07 PROCEDURE — 93010 ELECTROCARDIOGRAM REPORT: CPT | Mod: ,,, | Performed by: INTERNAL MEDICINE

## 2024-08-07 PROCEDURE — 63600175 PHARM REV CODE 636 W HCPCS: Performed by: EMERGENCY MEDICINE

## 2024-08-07 PROCEDURE — 80048 BASIC METABOLIC PNL TOTAL CA: CPT | Performed by: INTERNAL MEDICINE

## 2024-08-07 PROCEDURE — 25000003 PHARM REV CODE 250: Performed by: HOSPITALIST

## 2024-08-07 PROCEDURE — 25000003 PHARM REV CODE 250: Performed by: INTERNAL MEDICINE

## 2024-08-07 PROCEDURE — 25000242 PHARM REV CODE 250 ALT 637 W/ HCPCS: Performed by: INTERNAL MEDICINE

## 2024-08-07 PROCEDURE — 36415 COLL VENOUS BLD VENIPUNCTURE: CPT | Mod: XB | Performed by: EMERGENCY MEDICINE

## 2024-08-07 PROCEDURE — 63600175 PHARM REV CODE 636 W HCPCS: Performed by: HOSPITALIST

## 2024-08-07 PROCEDURE — 85025 COMPLETE CBC W/AUTO DIFF WBC: CPT | Performed by: EMERGENCY MEDICINE

## 2024-08-07 PROCEDURE — 25000003 PHARM REV CODE 250: Performed by: EMERGENCY MEDICINE

## 2024-08-07 PROCEDURE — 93005 ELECTROCARDIOGRAM TRACING: CPT

## 2024-08-07 PROCEDURE — 99223 1ST HOSP IP/OBS HIGH 75: CPT | Mod: ,,, | Performed by: INTERNAL MEDICINE

## 2024-08-07 PROCEDURE — 84484 ASSAY OF TROPONIN QUANT: CPT | Mod: 91 | Performed by: HOSPITALIST

## 2024-08-07 PROCEDURE — 99222 1ST HOSP IP/OBS MODERATE 55: CPT | Mod: ,,, | Performed by: INTERNAL MEDICINE

## 2024-08-07 PROCEDURE — 85730 THROMBOPLASTIN TIME PARTIAL: CPT | Performed by: HOSPITALIST

## 2024-08-07 PROCEDURE — 21400001 HC TELEMETRY ROOM

## 2024-08-07 RX ORDER — SODIUM CHLORIDE 9 MG/ML
INJECTION, SOLUTION INTRAVENOUS CONTINUOUS
Status: DISCONTINUED | OUTPATIENT
Start: 2024-08-07 | End: 2024-08-09

## 2024-08-07 RX ORDER — AMLODIPINE BESYLATE 2.5 MG/1
2.5 TABLET ORAL DAILY
Status: DISCONTINUED | OUTPATIENT
Start: 2024-08-07 | End: 2024-08-09 | Stop reason: HOSPADM

## 2024-08-07 RX ORDER — ACETYLCYSTEINE 100 MG/ML
600 SOLUTION ORAL; RESPIRATORY (INHALATION) 2 TIMES DAILY
Status: DISCONTINUED | OUTPATIENT
Start: 2024-08-07 | End: 2024-08-09 | Stop reason: HOSPADM

## 2024-08-07 RX ORDER — LEVOTHYROXINE SODIUM 50 UG/1
50 TABLET ORAL
Status: DISCONTINUED | OUTPATIENT
Start: 2024-08-07 | End: 2024-08-09 | Stop reason: HOSPADM

## 2024-08-07 RX ORDER — LOSARTAN POTASSIUM 25 MG/1
25 TABLET ORAL DAILY
Status: DISCONTINUED | OUTPATIENT
Start: 2024-08-07 | End: 2024-08-07

## 2024-08-07 RX ORDER — SODIUM BICARBONATE 650 MG/1
650 TABLET ORAL 3 TIMES DAILY
Status: DISCONTINUED | OUTPATIENT
Start: 2024-08-07 | End: 2024-08-09 | Stop reason: HOSPADM

## 2024-08-07 RX ORDER — CARVEDILOL 12.5 MG/1
12.5 TABLET ORAL 2 TIMES DAILY WITH MEALS
Status: DISCONTINUED | OUTPATIENT
Start: 2024-08-07 | End: 2024-08-09 | Stop reason: HOSPADM

## 2024-08-07 RX ADMIN — LOSARTAN POTASSIUM 25 MG: 25 TABLET, FILM COATED ORAL at 08:08

## 2024-08-07 RX ADMIN — SODIUM BICARBONATE 650 MG TABLET 650 MG: at 03:08

## 2024-08-07 RX ADMIN — CARVEDILOL 12.5 MG: 12.5 TABLET, FILM COATED ORAL at 09:08

## 2024-08-07 RX ADMIN — CARVEDILOL 12.5 MG: 12.5 TABLET, FILM COATED ORAL at 04:08

## 2024-08-07 RX ADMIN — ACETYLCYSTEINE 600 MG: 100 INHALANT RESPIRATORY (INHALATION) at 10:08

## 2024-08-07 RX ADMIN — AMLODIPINE BESYLATE 2.5 MG: 2.5 TABLET ORAL at 08:08

## 2024-08-07 RX ADMIN — HEPARIN SODIUM 12 UNITS/KG/HR: 10000 INJECTION, SOLUTION INTRAVENOUS at 12:08

## 2024-08-07 RX ADMIN — SODIUM CHLORIDE: 9 INJECTION, SOLUTION INTRAVENOUS at 07:08

## 2024-08-07 RX ADMIN — SODIUM BICARBONATE 650 MG TABLET 650 MG: at 08:08

## 2024-08-07 RX ADMIN — HEPARIN SODIUM 9 UNITS/KG/HR: 10000 INJECTION, SOLUTION INTRAVENOUS at 08:08

## 2024-08-07 RX ADMIN — LEVOTHYROXINE SODIUM 50 MCG: 50 TABLET ORAL at 04:08

## 2024-08-07 RX ADMIN — INSULIN ASPART 1 UNITS: 100 INJECTION, SOLUTION INTRAVENOUS; SUBCUTANEOUS at 08:08

## 2024-08-07 NOTE — PROGRESS NOTES
"[facility]  Progress Note    Patient Name: Elie Contreras  MRN: [id number]  Patient Class: IP- Inpatient   Admission Date: 8/6/2024  Length of Stay: 1 days  Attending Physician: Matt Diallo MD  Primary Care Provider: Rigoberto Champion MD        Subjective:     Principal Problem:NSTEMI (non-ST elevated myocardial infarction)        HPI:  Elie Contreras is a 63 y.o. male with a PMH  has a past medical history of Anemia, unspecified, Diabetes mellitus, Hypertension, Mixed hyperlipidemia, and Renal disorder. who presented as a transfer from [facility] for higher level of care after patient initially presented with acute onset and persistent substernal chest pain over the past few days.  Patient presented to outside facility complaining of substernal chest pain which was described as "feeling you get when you drink something really cold", intermittent in frequency, episodes varying in duration, nonradiating, with no known alleviating or aggravating factors noted.  Pain was rated 8/10 in severity at its worst and currently rated 3/10 at time of bedside assessment.  He denied prior history of MIs, heart failure, arrhythmias, acid reflex, or experiencing similar symptoms in the past.  Prior to onset of symptoms, patient reported being in his usual state of health with no other concerns or complaints.  All other review of systems negative except as noted above.  Initial workup in the ED revealed patient to be afebrile without leukocytosis, hemodynamically stable, SANTIAGO on CKD with creatinine/GFR measuring 4.3/14.8, , troponin 0.979, EKG positive for T-wave inversion noted in the inferior lateral leads, and chest x-ray negative for acute intrathoracic processes.  Cardiology consulted by ED staff and recommended patient be transferred to [place] for continued cardiac observation and patient will be evaluated in the morning.  Patient initiated on NSTEMI pathway and " "admitted to Hospital Medicine inpatient for continued medical management.    PCP: Rigoberto Champion      Overview/Hospital Course:  63 y.o. AAM, hx HTN, HLP, DM 2 w CKD 4, who was transferred from [facility] where he initially presented with acute onset and persistent substernal chest pain over the past few days. CP described as "feeling you get when you drink something really cold", intermittent in frequency, episodes varying in duration, nonradiating, with no known alleviating or aggravating factors noted.  Pain was rated 8/10 in severity at its worst and currently rated 3/10 at time of bedside assessment. He denied prior history of MIs, heart failure, arrhythmias, acid reflex, or experiencing similar symptoms in the past. Initial workup- Afebrile without leukocytosis, hemodynamically stable, SANTIAGO on CKD with Cr/GFR measuring 4.3/14.8, , troponin 0.979, EKG positive for T-wave inversion in the inferior lateral leads, and chest x-ray negative for acute intrathoracic processes. Cardiology consulted by ED staff and recommended patient be transferred to [place] for continued cardiac observation. Patient initiated on NSTEMI pathway- Heparin gtt and admitted to Hospital Medicine inpatient for continued medical management.    8/7- resting comfortably in bed, no CP or SOB, on Heparin gtt, no bleeding issues. Sitting and ambulating in the room easily. Troponin peaked from 0.89> 2.9> 5.16 > 6.1. pt has NSTEMI and Cards have evaluated the pt and recommended LHC but has Cr was 4.3 on admit, increased from baseline 3.6 last month. Renal consulted. Will add IVF to see if renal function improves.        Interval History: resting comfortably in bed, no CP or SOB, on Heparin gtt, no bleeding issues. Sitting and ambulating in the room easily. Troponin peaked from 0.89> 2.9> 5.16 > 6.1. pt has NSTEMI and Cards have evaluated the pt and recommended LHC but has Cr was 4.3 on admit, increased from baseline 3.6 last month. " Renal consulted. Will add IVF to see if renal function improves.        Review of Systems   Constitutional:  Positive for activity change.   Respiratory:  Positive for shortness of breath.    Cardiovascular:  Positive for chest pain.   All other systems reviewed and are negative.    Objective:     Vital Signs (Most Recent):  Temp: 97.8 °F (36.6 °C) (08/07/24 1532)  Pulse: 66 (08/07/24 1532)  Resp: 18 (08/07/24 1532)  BP: 122/71 (08/07/24 1532)  SpO2: 98 % (08/07/24 1532) Vital Signs (24h Range):  Temp:  [97.6 °F (36.4 °C)-98.3 °F (36.8 °C)] 97.8 °F (36.6 °C)  Pulse:  [60-75] 66  Resp:  [14-21] 18  SpO2:  [96 %-100 %] 98 %  BP: (110-139)/(67-79) 122/71     Weight: 104.8 kg (231 lb)  Body mass index is 29.66 kg/m².    Intake/Output Summary (Last 24 hours) at 8/7/2024 1606  Last data filed at 8/7/2024 0608  Gross per 24 hour   Intake 127.65 ml   Output --   Net 127.65 ml         Physical Exam  Vitals reviewed.   Constitutional:       General: He is not in acute distress.     Appearance: Normal appearance. He is obese. He is not ill-appearing, toxic-appearing or diaphoretic.   HENT:      Head: Normocephalic and atraumatic.      Right Ear: External ear normal.      Left Ear: External ear normal.      Nose: Nose normal. No congestion or rhinorrhea.      Mouth/Throat:      Mouth: Mucous membranes are moist.      Pharynx: Oropharynx is clear. No oropharyngeal exudate or posterior oropharyngeal erythema.   Eyes:      General: No scleral icterus.     Extraocular Movements: Extraocular movements intact.      Conjunctiva/sclera: Conjunctivae normal.      Pupils: Pupils are equal, round, and reactive to light.   Neck:      Vascular: No carotid bruit.   Cardiovascular:      Rate and Rhythm: Normal rate and regular rhythm.      Pulses: Normal pulses.      Heart sounds: Normal heart sounds. No murmur heard.     No friction rub. No gallop.   Pulmonary:      Effort: Pulmonary effort is normal. No respiratory distress.      Breath  sounds: Normal breath sounds. No stridor. No wheezing, rhonchi or rales.   Chest:      Chest wall: No tenderness.   Abdominal:      General: Abdomen is flat. Bowel sounds are normal. There is no distension.      Palpations: Abdomen is soft. There is no mass.      Tenderness: There is no abdominal tenderness. There is no right CVA tenderness, left CVA tenderness, guarding or rebound.      Hernia: No hernia is present.   Musculoskeletal:         General: No swelling, tenderness, deformity or signs of injury. Normal range of motion.      Cervical back: Normal range of motion and neck supple. No rigidity or tenderness.      Right lower leg: No edema.      Left lower leg: No edema.   Lymphadenopathy:      Cervical: No cervical adenopathy.   Skin:     General: Skin is warm and dry.      Capillary Refill: Capillary refill takes less than 2 seconds.      Coloration: Skin is not jaundiced or pale.      Findings: No bruising, erythema, lesion or rash.   Neurological:      General: No focal deficit present.      Mental Status: He is alert and oriented to person, place, and time. Mental status is at baseline.      Cranial Nerves: No cranial nerve deficit.      Sensory: No sensory deficit.      Motor: No weakness.      Coordination: Coordination normal.      Gait: Gait normal.      Deep Tendon Reflexes: Reflexes normal.   Psychiatric:         Mood and Affect: Mood normal.         Behavior: Behavior normal.         Thought Content: Thought content normal.         Judgment: Judgment normal.             Significant Labs: All pertinent labs within the past 24 hours have been reviewed.  A1C:   Recent Labs   Lab 02/21/24  1117 08/06/24  2311   HGBA1C 7.2* 6.9*     CBC:   Recent Labs   Lab 08/06/24  1744 08/07/24  0513   WBC 3.54* 3.83*   HGB 10.9* 10.5*   HCT 32.0* 32.0*    137*     CMP:   Recent Labs   Lab 08/06/24  1744      K 4.4      CO2 21*   *   BUN 46*   CREATININE 4.3*   CALCIUM 9.5   PROT 7.3  "  ALBUMIN 3.6   BILITOT 0.4   ALKPHOS 84   AST 32   ALT 39   ANIONGAP 9     Cardiac Markers:   Recent Labs   Lab 08/06/24  1744   BNP 78     Coagulation:   Recent Labs   Lab 08/06/24  1933 08/07/24  0402 08/07/24  1247   INR 1.0  --   --    APTT 25.1   < > 26.5    < > = values in this interval not displayed.     Magnesium: No results for input(s): "MG" in the last 48 hours.  POCT Glucose:   Recent Labs   Lab 08/07/24  0436 08/07/24  1126   POCTGLUCOSE 79 193*     Troponin:   Recent Labs   Lab 08/06/24  2310 08/07/24  0235 08/07/24  0513   TROPONINI 2.894* 5.159* 6.102*     TSH:   Recent Labs   Lab 02/29/24  1031   TSH 6.610*       Significant Imaging: I have reviewed all pertinent imaging results/findings within the past 24 hours.    Assessment/Plan:      * NSTEMI (non-ST elevated myocardial infarction)  Patient presents with NSTEMI. Chest pain is currently uncontrolled. EMY score is 4. Patient is currently on NSTEMI Pathway.    EKG reviewed. Troponins reviewed and results noted-   Recent Labs   Lab 08/07/24  0513   TROPONINI 6.102*       Lipid panel reviewed and shows-     Lab Results   Component Value Date    LDLCALC 51.6 (L) 08/06/2024     Lab Results   Component Value Date    TRIG 52 08/06/2024     Medical management includes; Dual Anti-Platelet therapy, Anticoagulation, High Intensity Stain, and Nitrate Echo has not been performed but currently pending. Latest ECHO results are as follows- Results for orders placed during the hospital encounter of 12/27/23    Echo    Interpretation Summary    Left Ventricle: The left ventricle is moderately dilated. Mildly increased wall thickness. There is concentric remodeling. Moderate global hypokinesis present. There is moderately reduced systolic function with a visually estimated ejection fraction of 35 - 40%. Biplane (2D) method of discs ejection fraction is 33%.    Right Ventricle: Normal right ventricular cavity size. Wall thickness is normal. Right ventricle wall " motion  is normal. Systolic function is normal.    Left Atrium: Left atrium is mildly dilated.    Tricuspid Valve: There is mild regurgitation with a centrally directed jet.    Pulmonary Artery: The estimated pulmonary artery systolic pressure is 25 mmHg.    IVC/SVC: Normal venous pressure at 3 mmHg.    Consult for cardiac rehab is ordered. Patient counseled on lifestyle modifications- begin progressive daily aerobic exercise program, follow a low fat, low cholesterol diet, attempt to lose weight, reduce salt in diet and cooking, reduce exposure to stress, improve dietary compliance, use calcium 1 gram daily with Vit D, continue current medications, continue current healthy lifestyle patterns, and return for routine annual checkups. Cardiology is consulted. Plan of care pending with cardiology team. Continue to monitor patient closely and adjust therapy as needed.    Atypical CP associated with Positive and rising Troponin- treat as NSTEMI- cont Heparin gtt  Cards following and rec LHC but Cr very high- Renal consulted    Acute kidney injury superimposed on chronic kidney disease  SANTIAGO likely acute on chronic vs progression of CKD. Baseline creatinine is  3.4-3.6 . Most recent creatinine and eGFR are listed below.  Recent Labs     08/06/24  1744   CREATININE 4.3*   EGFRNORACEVR 14.8*        Plan  - SANTIAGO is  currently undergoing medical management  - Avoid nephrotoxins and renally dose meds for GFR listed above  - Monitor urine output, serial BMP, and adjust therapy as needed    Start IVF, await Renal input     Type 2 diabetes mellitus  Patient's FSGs are uncontrolled due to hyperglycemia on current medication regimen.  Last A1c reviewed-   Lab Results   Component Value Date    HGBA1C 6.9 (H) 08/06/2024     Most recent fingerstick glucose reviewed-   Recent Labs   Lab 08/07/24  0436 08/07/24  1126   POCTGLUCOSE 79 193*     Current correctional scale  Low  Titrate as needed  anti-hyperglycemic dose as follows-    Antihyperglycemics (From admission, onward)      Start     Stop Route Frequency Ordered    08/06/24 8906  insulin aspart U-100 pen 0-5 Units         -- SubQ Before meals & nightly PRN 08/06/24 6376        Plan:  -SSI  -A1c  -Accu-checks  -Hold oral hypoglycemics while patient is in the hospital  -Hypoglycemic protocol      BS under control    Anemia  Anemia is likely due to chronic disease due to Chronic Kidney Disease. Most recent hemoglobin and hematocrit are listed below.  Recent Labs     08/06/24  1744 08/07/24  0513   HGB 10.9* 10.5*   HCT 32.0* 32.0*     Plan  -Monitor serial CBC: Daily  -Transfuse PRBC if patient becomes hemodynamically unstable, symptomatic or H/H drops below 7/21.  -Patient has not received any PRBC transfusions to date  -Patient's anemia is currently stable    H/H expected to fall with IVF- watch     Hypertension  Chronic, controlled. Latest blood pressure and vitals reviewed-     Temp:  [97.6 °F (36.4 °C)-97.9 °F (36.6 °C)]   Pulse:  [60-75]   Resp:  [14-21]   BP: (115-139)/(67-78)   SpO2:  [97 %-100 %] .   Home meds for hypertension were reviewed and noted below.   Hypertension Medications               amlodipine (NORVASC) 2.5 MG tablet Take 2.5 mg by mouth once daily.    carvediloL (COREG) 12.5 MG tablet TAKE 1 TABLET BY MOUTH IN THE MORNING AND 1 IN THE EVENING WITH MEALS    losartan (COZAAR) 25 MG tablet Take 25 mg by mouth once daily.     While in the hospital, will manage blood pressure as follows; Continue home antihypertensive regimen    Will utilize p.r.n. blood pressure medication only if patient's blood pressure greater than 160/100 and he develops symptoms such as worsening chest pain or shortness of breath.      HLD (hyperlipidemia)  Patient is chronically on statin.will continue for now. Last Lipid Panel:   Lab Results   Component Value Date    CHOL 98 (L) 08/06/2024    HDL 36 (L) 08/06/2024    LDLCALC 51.6 (L) 08/06/2024    TRIG 52 08/06/2024    CHOLHDL 36.7 08/06/2024    Plan:  -Continue home medication  -low fat/low calorie diet    Cont Lipitor      Chest pain  See NSTEMI above        VTE Risk Mitigation (From admission, onward)           Ordered     Place sequential compression device  Until discontinued         08/06/24 2251     IP VTE LOW RISK PATIENT  Once         08/06/24 2251     heparin 25,000 units in dextrose 5% (100 units/ml) IV bolus from bag LOW INTENSITY nomogram - [facility]  As needed (PRN)        Question:  Heparin Infusion Adjustment (DO NOT MODIFY ANSWER)  Answer:  [email or web address]\epic\Images\Pharmacy\HeparinInfusions\heparin LOW INTENSITY nomogram for [facility] [id number].pdf    08/06/24 1925     heparin 25,000 units in dextrose 5% (100 units/ml) IV bolus from bag LOW INTENSITY nomogram - [facility]  As needed (PRN)        Question:  Heparin Infusion Adjustment (DO NOT MODIFY ANSWER)  Answer:  [email or web address]\epic\Images\Pharmacy\HeparinInfusions\heparin LOW INTENSITY nomogram for [facility] [id number].pdf    08/06/24 1925     heparin 25,000 units in dextrose 5% 250 mL (100 units/mL) infusion LOW INTENSITY nomogram - [facility]  Continuous        Question:  Begin at (units/kg/hr)  Answer:  12    08/06/24 1925                    Discharge Planning   KELI:      Code Status: Full Code   Is the patient medically ready for discharge?:     Reason for patient still in hospital (select all that apply): Patient trending condition, Laboratory test, Treatment, Imaging, and Consult recommendations  Discharge Plan A: Home        Matt Diallo MD  Department of Hospital Medicine   [facility] - Telemetry ([facility])

## 2024-08-07 NOTE — ASSESSMENT & PLAN NOTE
SANTIAGO likely acute on chronic vs progression of CKD. Baseline creatinine is  3.4-3.6 . Most recent creatinine and eGFR are listed below.  Recent Labs     08/06/24  1744   CREATININE 4.3*   EGFRNORACEVR 14.8*        Plan  - SANTIAGO is  currently undergoing medical management  - Avoid nephrotoxins and renally dose meds for GFR listed above  - Monitor urine output, serial BMP, and adjust therapy as needed    Start IVF, await Renal input

## 2024-08-07 NOTE — PLAN OF CARE
Problem: Adult Inpatient Plan of Care  Goal: Plan of Care Review  Outcome: Progressing  Goal: Patient-Specific Goal (Individualized)  Outcome: Progressing  Goal: Absence of Hospital-Acquired Illness or Injury  Outcome: Progressing  Goal: Optimal Comfort and Wellbeing  Outcome: Progressing  Goal: Readiness for Transition of Care  Outcome: Progressing     Problem: Acute Coronary Syndrome  Goal: Optimal Adaptation to Illness  Outcome: Progressing  Goal: Absence of Cardiac-Related Pain  Outcome: Progressing  Goal: Normalized Cardiac Rhythm  Outcome: Progressing  Goal: Effective Cardiac Pump Function  Outcome: Progressing  Goal: Adequate Tissue Perfusion  Outcome: Progressing     Problem: Cardiac Catheterization (Diagnostic/Interventional)  Goal: Absence of Bleeding  Outcome: Progressing  Goal: Absence of Contrast-Induced Injury  Outcome: Progressing  Goal: Stable Heart Rate and Rhythm  Outcome: Progressing  Goal: Absence of Embolism Signs and Symptoms  Outcome: Progressing  Goal: Anesthesia/Sedation Recovery  Outcome: Progressing  Goal: Optimal Pain Control and Function  Outcome: Progressing  Goal: Absence of Vascular Access Complication  Outcome: Progressing     Problem: Diabetes Comorbidity  Goal: Blood Glucose Level Within Targeted Range  Outcome: Progressing     Problem: Acute Kidney Injury/Impairment  Goal: Fluid and Electrolyte Balance  Outcome: Progressing  Goal: Improved Oral Intake  Outcome: Progressing  Goal: Effective Renal Function  Outcome: Progressing

## 2024-08-07 NOTE — PLAN OF CARE
POC reviewed with pt. Pt verbalizes understanding of POC. No questions at this time.  AAOx4. NADN.  NSR on cardiac monitor. Achs Accucheck   Monitor aPTT.  Pt remains free of falls.  No complaints at this time.  Safety measures in place. Will continue to monitor.  Informed pt to call for assistance before getting up. Pt verbalizes understanding.  Hourly rounding and chart check complete.       A203/A203 KYUNG Contreras is a 63 y.o.male admitted on 8/6/2024 for Chest pain   Code Status: Full Code MRN: [id number]   Review of patient's allergies indicates:  No Known Allergies  Past Medical History:   Diagnosis Date    Anemia, unspecified     Diabetes mellitus     Hypertension     Mixed hyperlipidemia     Renal disorder       PRN meds    dextrose 10%, 12.5 g, PRN  dextrose 10%, 25 g, PRN  glucagon (human recombinant), 1 mg, PRN  glucose, 16 g, PRN  glucose, 24 g, PRN  heparin (PORCINE), 37.7 Units/kg, PRN  heparin (PORCINE), 30 Units/kg, PRN  insulin aspart U-100, 0-5 Units, QID (AC + HS) PRN  nitroGLYCERIN, 0.4 mg, Q5 Min PRN      Chart check completed. Will continue plan of care.         Rosa Maria Coma Scale Score: 15     Lead Monitored: Lead II Rhythm: normal sinus rhythm    Cardiac/Telemetry Box Number: [id number]    Last Bowel Movement: 08/05/24  Diet Cardiac     Rogers Score: 21  Fall Risk Score: 0  Accucheck []   Freq?      Lines/Drains/Airways       Peripheral Intravenous Line  Duration                  Peripheral IV - Single Lumen 08/06/24 1744 20 G Anterior;Left;Proximal Forearm <1 day

## 2024-08-07 NOTE — ASSESSMENT & PLAN NOTE
63 y.o. male with a PMH has a past medical history of Anemia, unspecified, Diabetes mellitus, Hypertension, Mixed hyperlipidemia, and CKD probably stage 4 ( Cr 4.3)echocardiogram EF 35-40%, NMT/stress 1-24 with fixed defect  Admitted for NSTEMI. Peak CK 6.01. EKG no changes NSR, st-t changes inferolaterally. CP is atypical though with recent exertional sx.No sx currently    Recommend LHC but high risk for contrast nephropathy, will need renal clearance  Continue IV heparin, asa, b blockers

## 2024-08-07 NOTE — ASSESSMENT & PLAN NOTE
Anemia is likely due to chronic disease due to Chronic Kidney Disease. Most recent hemoglobin and hematocrit are listed below.  Recent Labs     08/06/24  1744 08/07/24  0513   HGB 10.9* 10.5*   HCT 32.0* 32.0*     Plan  -Monitor serial CBC: Daily  -Transfuse PRBC if patient becomes hemodynamically unstable, symptomatic or H/H drops below 7/21.  -Patient has not received any PRBC transfusions to date  -Patient's anemia is currently stable    H/H expected to fall with IVF- watch

## 2024-08-07 NOTE — CONSULTS
[facility] ([facility])  Nephrology  Consult Note      Patient Name: Elie Contreras  MRN: [id number]  Admission Date: 8/6/2024  Hospital Length of Stay: 1 days  Attending Provider: Matt Diallo MD   Primary Care Physician: Rigoberto Champion MD  Principal Problem:NSTEMI (non-ST elevated myocardial infarction)    Reason for consult: SANTIAGO on CKD. Concerns regarding contrast use post MI in C    Consults  Subjective:     HPI: Thank you for referring the pt to us. H/o and chart were reviewed. Pt was seen and examined. Pt is a 62 y/o male with CKD stage 4, DM-2, HTN, CAD, and diastolic CHF, who presented with NSTEMI. Pt currently feels well, no CP, no SOB. S Cr has slightly worsened from baseline. Pt is a non-smoker. Cardiology is recommending urgent C, but renal function remains a concern. Fully discussed with pt the use of Iv contrast and his renal function.    Past Medical History:   Diagnosis Date    Anemia, unspecified     Diabetes mellitus     Hypertension     Mixed hyperlipidemia     Renal disorder        Past Surgical History:   Procedure Laterality Date    CHOLECYSTECTOMY      KNEE SURGERY      right       Review of patient's allergies indicates:  No Known Allergies  Current Facility-Administered Medications   Medication Frequency    0.9%  NaCl infusion Continuous    acetylcysteine 100 mg/ml (10%) solution 600 mg BID    amLODIPine tablet 2.5 mg Daily    aspirin chewable tablet 81 mg Daily    carvediloL tablet 12.5 mg BID WM    clopidogreL tablet 75 mg Daily    dextrose 10% bolus 125 mL 125 mL PRN    dextrose 10% bolus 250 mL 250 mL PRN    glucagon (human recombinant) injection 1 mg PRN    glucose chewable tablet 16 g PRN    glucose chewable tablet 24 g PRN    heparin 25,000 units in dextrose 5% (100 units/ml) IV bolus from bag LOW INTENSITY nomogram - [facility] PRN    heparin 25,000 units in dextrose 5% (100 units/ml) IV bolus from bag LOW INTENSITY nomogram - [facility] PRN    heparin 25,000 units in dextrose  5% 250 mL (100 units/mL) infusion LOW INTENSITY nomogram - [facility] Continuous    insulin aspart U-100 pen 0-5 Units QID (AC + HS) PRN    levothyroxine tablet 50 mcg Before breakfast    nitroGLYCERIN SL tablet 0.4 mg Q5 Min PRN    sodium bicarbonate tablet 650 mg TID     Family History    None       Tobacco Use    Smoking status: Former    Smokeless tobacco: Never   Substance and Sexual Activity    Alcohol use: Yes     Comment: occas    Drug use: No    Sexual activity: Yes     Review of Systems   Constitutional: Negative.    HENT: Negative.     Respiratory: Negative.     Cardiovascular: Negative.    Gastrointestinal: Negative.    Genitourinary: Negative.    Musculoskeletal: Negative.    Neurological: Negative.    Psychiatric/Behavioral: Negative.       Objective:     Vital Signs (Most Recent):  Temp: 97.8 °F (36.6 °C) (08/07/24 1532)  Pulse: 66 (08/07/24 1532)  Resp: 18 (08/07/24 1532)  BP: 122/71 (08/07/24 1532)  SpO2: 98 % (08/07/24 1532) Vital Signs (24h Range):  Temp:  [97.6 °F (36.4 °C)-98.3 °F (36.8 °C)] 97.8 °F (36.6 °C)  Pulse:  [60-71] 66  Resp:  [14-21] 18  SpO2:  [96 %-100 %] 98 %  BP: (110-137)/(67-79) 122/71     Weight: 104.8 kg (231 lb) (08/07/24 0825)  Body mass index is 29.66 kg/m².  Body surface area is 2.34 meters squared.    I/O last 3 completed shifts:  In: 127.7 [I.V.:127.7]  Out: -      Physical Exam  Vitals and nursing note reviewed.   Constitutional:       Appearance: Normal appearance.   Cardiovascular:      Rate and Rhythm: Normal rate and regular rhythm.      Pulses: Normal pulses.      Heart sounds: Normal heart sounds.   Pulmonary:      Breath sounds: Normal breath sounds. No rales.   Abdominal:      Palpations: Abdomen is soft.      Tenderness: There is no abdominal tenderness.   Musculoskeletal:      Right lower leg: No edema.      Left lower leg: No edema.   Neurological:      Mental Status: He is alert and oriented to person, place, and time.   Psychiatric:         Behavior: Behavior  normal.          Significant Labs: reviewed  BMP  Lab Results   Component Value Date     08/06/2024    K 4.4 08/06/2024     08/06/2024    CO2 21 (L) 08/06/2024    BUN 46 (H) 08/06/2024    CREATININE 4.3 (H) 08/06/2024    CALCIUM 9.5 08/06/2024    ANIONGAP 9 08/06/2024    EGFRNORACEVR 14.8 (A) 08/06/2024     Lab Results   Component Value Date    WBC 3.83 (L) 08/07/2024    HGB 10.5 (L) 08/07/2024    HCT 32.0 (L) 08/07/2024    MCV 90 08/07/2024     (L) 08/07/2024       Recent Labs   Lab 08/07/24 0513   TROPONINI 6.102*     U/a: 3+protein, 1+blood  Urine p/cr 760 mg      Significant Imaging: reviewed CXR, clear lungs    Echo reviewed: diastolic dysfunction, has well-preserved EF         Assessment/Plan:     64 y/o male with CKD stage 4 and DM presented post NSTEMI with SANTIAGO:    Acute kidney injury superimposed on chronic kidney disease  Pt has mild SANTIAGO on advanced CKD stage 4  H/o of DM and HTN  SANTIAGO likely due to pre-renal azotemia from the effect of the MI  Has proteinuria  CKD likely due to diabetic nephropathy and HTN    Risk of contrast nephropathy was discussed with pt in details  This risk is not definite, and with the new generation of dyes will be unlikely to occur  Without LHC, CHF will worsen.  Agree with cardiology  Pt understands the risk and accepts to do the procedure    Recommend:  Continue NS IVF's at 50-75 ml/hr  Mucomyst 600 mg po bid x 2 doses before and 2 doses post contrast  Hold diuretics on day of LHC    NSTEMI (non-ST elevated myocardial infarction)  Pt has heart disease  CAD, NSTEMI  Diastolic CHF. May have has silent MI's in the past, which is common in diabetics  Risk for heart disease in this pt is DM and being male, pt is non-smoker  IV contrast use recommendations as above    Type 2 diabetes mellitus  Reviewed the chart  Elevated HgA1C about 7  Not fully controlled  Diet and life style briefly reviewed with pt      Plans and recommendations:  As detailed above  Total time  spent 70 minutes including time needed to review the records, the   patient evaluation, documentation, face-to-face discussion with the patient,   more than 50% of the time was spent on coordination of care and counseling.    Level V visit.      Thank you for your consult.     Shanna Wang MD   Nephrology  [facility] - Tele[facility] ([facility])

## 2024-08-07 NOTE — SUBJECTIVE & OBJECTIVE
Past Medical History:   Diagnosis Date    Anemia, unspecified     Diabetes mellitus     Hypertension     Mixed hyperlipidemia     Renal disorder        Past Surgical History:   Procedure Laterality Date    CHOLECYSTECTOMY      KNEE SURGERY      right       Review of patient's allergies indicates:  No Known Allergies    No current facility-administered medications on file prior to encounter.     Current Outpatient Medications on File Prior to Encounter   Medication Sig    amlodipine (NORVASC) 2.5 MG tablet Take 2.5 mg by mouth once daily.    aspirin (ECOTRIN) 81 MG EC tablet Take 1 tablet (81 mg total) by mouth once daily.    atorvastatin (LIPITOR) 40 MG tablet Take 1 tablet (40 mg total) by mouth every evening.    carvediloL (COREG) 12.5 MG tablet TAKE 1 TABLET BY MOUTH IN THE MORNING AND 1 IN THE EVENING WITH MEALS    cetirizine (ZYRTEC) 10 MG tablet Take 1 tablet (10 mg total) by mouth once daily.    glipiZIDE (GLUCOTROL) 5 MG TR24 Take 5 mg by mouth daily with breakfast.    linaGLIPtin (TRADJENTA) 5 mg Tab tablet Take 1 tablet by mouth once daily.    losartan (COZAAR) 25 MG tablet Take 25 mg by mouth once daily.    metformin (GLUCOPHAGE) 1000 MG tablet Take 1,000 mg by mouth 2 (two) times daily with meals.    sodium bicarbonate 650 MG tablet Take 650 mg by mouth 3 (three) times daily.     Family History    None       Tobacco Use    Smoking status: Former    Smokeless tobacco: Never   Substance and Sexual Activity    Alcohol use: Yes     Comment: occas    Drug use: No    Sexual activity: Yes     Review of Systems   All other systems reviewed and are negative.    Objective:     Vital Signs (Most Recent):  Temp: 97.8 °F (36.6 °C) (08/06/24 2146)  Pulse: 60 (08/06/24 2147)  Resp: 18 (08/06/24 2146)  BP: 137/78 (08/06/24 2146)  SpO2: 99 % (08/06/24 2146) Vital Signs (24h Range):  Temp:  [97.8 °F (36.6 °C)-97.9 °F (36.6 °C)] 97.8 °F (36.6 °C)  Pulse:  [60-75] 60  Resp:  [14-21] 18  SpO2:  [99 %-100 %] 99 %  BP:  (118-139)/(67-78) 137/78     Weight: 104.8 kg (231 lb 0.7 oz)  Body mass index is 29.66 kg/m².     Physical Exam  Vitals reviewed.   Constitutional:       General: He is not in acute distress.     Appearance: Normal appearance. He is obese. He is not ill-appearing, toxic-appearing or diaphoretic.   HENT:      Head: Normocephalic and atraumatic.      Right Ear: External ear normal.      Left Ear: External ear normal.      Nose: Nose normal. No congestion or rhinorrhea.      Mouth/Throat:      Mouth: Mucous membranes are moist.      Pharynx: Oropharynx is clear. No oropharyngeal exudate or posterior oropharyngeal erythema.   Eyes:      General: No scleral icterus.     Extraocular Movements: Extraocular movements intact.      Conjunctiva/sclera: Conjunctivae normal.      Pupils: Pupils are equal, round, and reactive to light.   Neck:      Vascular: No carotid bruit.   Cardiovascular:      Rate and Rhythm: Normal rate and regular rhythm.      Pulses: Normal pulses.      Heart sounds: Normal heart sounds. No murmur heard.     No friction rub. No gallop.   Pulmonary:      Effort: Pulmonary effort is normal. No respiratory distress.      Breath sounds: Normal breath sounds. No stridor. No wheezing, rhonchi or rales.   Chest:      Chest wall: No tenderness.   Abdominal:      General: Abdomen is flat. Bowel sounds are normal. There is no distension.      Palpations: Abdomen is soft. There is no mass.      Tenderness: There is no abdominal tenderness. There is no right CVA tenderness, left CVA tenderness, guarding or rebound.      Hernia: No hernia is present.   Musculoskeletal:         General: No swelling, tenderness, deformity or signs of injury. Normal range of motion.      Cervical back: Normal range of motion and neck supple. No rigidity or tenderness.      Right lower leg: No edema.      Left lower leg: No edema.   Lymphadenopathy:      Cervical: No cervical adenopathy.   Skin:     General: Skin is warm and dry.       Capillary Refill: Capillary refill takes less than 2 seconds.      Coloration: Skin is not jaundiced or pale.      Findings: No bruising, erythema, lesion or rash.   Neurological:      General: No focal deficit present.      Mental Status: He is alert and oriented to person, place, and time. Mental status is at baseline.      Cranial Nerves: No cranial nerve deficit.      Sensory: No sensory deficit.      Motor: No weakness.      Coordination: Coordination normal.      Gait: Gait normal.      Deep Tendon Reflexes: Reflexes normal.   Psychiatric:         Mood and Affect: Mood normal.         Behavior: Behavior normal.         Thought Content: Thought content normal.         Judgment: Judgment normal.              CRANIAL NERVES     CN III, IV, VI   Pupils are equal, round, and reactive to light.       Significant Labs: All pertinent labs within the past 24 hours have been reviewed.    Significant Imaging: I have reviewed all pertinent imaging results/findings within the past 24 hours.    LABS:  Recent Results (from the past 24 hour(s))   CBC Auto Differential    Collection Time: 08/06/24  5:44 PM   Result Value Ref Range    WBC 3.54 (L) 3.90 - 12.70 K/uL    RBC 3.65 (L) 4.60 - 6.20 M/uL    Hemoglobin 10.9 (L) 14.0 - 18.0 g/dL    Hematocrit 32.0 (L) 40.0 - 54.0 %    MCV 88 82 - 98 fL    MCH 29.9 27.0 - 31.0 pg    MCHC 34.1 32.0 - 36.0 g/dL    RDW 12.0 11.5 - 14.5 %    Platelets 155 150 - 450 K/uL    MPV 9.3 9.2 - 12.9 fL    Immature Granulocytes 0.0 0.0 - 0.5 %    Gran # (ANC) 2.2 1.8 - 7.7 K/uL    Immature Grans (Abs) 0.00 0.00 - 0.04 K/uL    Lymph # 0.8 (L) 1.0 - 4.8 K/uL    Mono # 0.4 0.3 - 1.0 K/uL    Eos # 0.1 0.0 - 0.5 K/uL    Baso # 0.03 0.00 - 0.20 K/uL    nRBC 0 0 /100 WBC    Gran % 60.8 38.0 - 73.0 %    Lymph % 23.4 18.0 - 48.0 %    Mono % 11.9 4.0 - 15.0 %    Eosinophil % 3.1 0.0 - 8.0 %    Basophil % 0.8 0.0 - 1.9 %    Differential Method Automated    Comprehensive Metabolic Panel    Collection Time:  08/06/24  5:44 PM   Result Value Ref Range    Sodium 138 136 - 145 mmol/L    Potassium 4.4 3.5 - 5.1 mmol/L    Chloride 108 95 - 110 mmol/L    CO2 21 (L) 23 - 29 mmol/L    Glucose 202 (H) 70 - 110 mg/dL    BUN 46 (H) 8 - 23 mg/dL    Creatinine 4.3 (H) 0.5 - 1.4 mg/dL    Calcium 9.5 8.7 - 10.5 mg/dL    Total Protein 7.3 6.0 - 8.4 g/dL    Albumin 3.6 3.5 - 5.2 g/dL    Total Bilirubin 0.4 0.1 - 1.0 mg/dL    Alkaline Phosphatase 84 55 - 135 U/L    AST 32 10 - 40 U/L    ALT 39 10 - 44 U/L    eGFR 14.8 (A) >60 mL/min/1.73 m^2    Anion Gap 9 8 - 16 mmol/L   BNP    Collection Time: 08/06/24  5:44 PM   Result Value Ref Range    BNP 78 0 - 99 pg/mL   CK    Collection Time: 08/06/24  5:44 PM   Result Value Ref Range     (H) 20 - 200 U/L   Troponin I    Collection Time: 08/06/24  5:44 PM   Result Value Ref Range    Troponin I 0.979 (H) 0.000 - 0.026 ng/mL   APTT    Collection Time: 08/06/24  7:33 PM   Result Value Ref Range    aPTT 25.1 21.0 - 32.0 sec   Protime-INR    Collection Time: 08/06/24  7:33 PM   Result Value Ref Range    Prothrombin Time 10.9 9.0 - 12.5 sec    INR 1.0 0.8 - 1.2       RADIOLOGY  X-Ray Chest AP Portable    Result Date: 8/6/2024  EXAMINATION: XR CHEST AP PORTABLE CLINICAL HISTORY: chest pain; TECHNIQUE: Single frontal portable view of the chest was performed. COMPARISON: 09/01/2023 FINDINGS: The no new pulmonary consolidation pleural effusion or convincing pneumothorax     X no active or adverse finding Electronically signed by: Jana Andersen Date:    08/06/2024 Time:    18:52      EKG    MICROBIOLOGY    MDM

## 2024-08-07 NOTE — ASSESSMENT & PLAN NOTE
Patient is chronically on statin.will continue for now. Last Lipid Panel:   Lab Results   Component Value Date    CHOL 98 (L) 08/06/2024    HDL 36 (L) 08/06/2024    LDLCALC 51.6 (L) 08/06/2024    TRIG 52 08/06/2024    CHOLHDL 36.7 08/06/2024   Plan:  -Continue home medication  -low fat/low calorie diet    Cont Lipitor

## 2024-08-07 NOTE — ASSESSMENT & PLAN NOTE
Reviewed the chart  Elevated HgA1C about 7  Not fully controlled  Diet and life style briefly reviewed with pt

## 2024-08-07 NOTE — NURSING
Pt troponin level increased from 0.979 to 2.894 at 0033. Dr. Chang was notified. At 0317, the troponin level had risen further to 5.159, and Dr. Chang was again informed. The patient is currently receiving a heparin drip at 12 units. Pt denies any chest pain, vitals are stable and no sign of acute distress. No new orders.    Lab called at 0321 that the pt aPTT was 100, heparin drip stopped @0322 and a stat redraw order. Dr Chang notified.

## 2024-08-07 NOTE — HPI
"Elie Contreras is a 63 y.o. male with a PMH  has a past medical history of Anemia, unspecified, Diabetes mellitus, Hypertension, Mixed hyperlipidemia, and Renal disorder. who presented as a transfer from [facility] for higher level of care after patient initially presented with acute onset and persistent substernal chest pain over the past few days.  Patient presented to outside facility complaining of substernal chest pain which was described as "feeling you get when you drink something really cold", intermittent in frequency, episodes varying in duration, nonradiating, with no known alleviating or aggravating factors noted.  Pain was rated 8/10 in severity at its worst and currently rated 3/10 at time of bedside assessment.  He denied prior history of MIs, heart failure, arrhythmias, acid reflex, or experiencing similar symptoms in the past.  Prior to onset of symptoms, patient reported being in his usual state of health with no other concerns or complaints.  All other review of systems negative except as noted above.  Initial workup in the ED revealed patient to be afebrile without leukocytosis, hemodynamically stable, SANTIAGO on CKD with creatinine/GFR measuring 4.3/14.8, , troponin 0.979, EKG positive for T-wave inversion noted in the inferior lateral leads, and chest x-ray negative for acute intrathoracic processes.  Cardiology consulted by ED staff and recommended patient be transferred to [place] for continued cardiac observation and patient will be evaluated in the morning.  Patient initiated on NSTEMI pathway and admitted to Hospital Medicine inpatient for continued medical management.    PCP: Rigoberto Champion.    "

## 2024-08-07 NOTE — ASSESSMENT & PLAN NOTE
Patient presents with NSTEMI. Chest pain is currently uncontrolled. EMY score is 4. Patient is currently on NSTEMI Pathway.    EKG reviewed. Troponins reviewed and results noted-   Recent Labs   Lab 08/06/24  1744   TROPONINI 0.979*     Lipid panel reviewed and shows-     Lab Results   Component Value Date    LDLCALC 124 (H) 02/21/2024     Lab Results   Component Value Date    TRIG 84 02/21/2024     Medical management includes; Dual Anti-Platelet therapy, Anticoagulation, High Intensity Stain, and Nitrate Echo has not been performed but currently pending. Latest ECHO results are as follows- Results for orders placed during the hospital encounter of 12/27/23    Echo    Interpretation Summary    Left Ventricle: The left ventricle is moderately dilated. Mildly increased wall thickness. There is concentric remodeling. Moderate global hypokinesis present. There is moderately reduced systolic function with a visually estimated ejection fraction of 35 - 40%. Biplane (2D) method of discs ejection fraction is 33%.    Right Ventricle: Normal right ventricular cavity size. Wall thickness is normal. Right ventricle wall motion  is normal. Systolic function is normal.    Left Atrium: Left atrium is mildly dilated.    Tricuspid Valve: There is mild regurgitation with a centrally directed jet.    Pulmonary Artery: The estimated pulmonary artery systolic pressure is 25 mmHg.    IVC/SVC: Normal venous pressure at 3 mmHg.    Consult for cardiac rehab is ordered. Patient counseled on lifestyle modifications- begin progressive daily aerobic exercise program, follow a low fat, low cholesterol diet, attempt to lose weight, reduce salt in diet and cooking, reduce exposure to stress, improve dietary compliance, use calcium 1 gram daily with Vit D, continue current medications, continue current healthy lifestyle patterns, and return for routine annual checkups. Cardiology is consulted. Plan of care pending with cardiology team. Continue to  monitor patient closely and adjust therapy as needed.

## 2024-08-07 NOTE — ASSESSMENT & PLAN NOTE
"Patient's FSGs are uncontrolled due to hyperglycemia on current medication regimen.  Last A1c reviewed-   Lab Results   Component Value Date    HGBA1C 7.2 (H) 02/21/2024     Most recent fingerstick glucose reviewed- No results for input(s): "POCTGLUCOSE" in the last 24 hours.  Current correctional scale  Low  Titrate as needed  anti-hyperglycemic dose as follows-   Antihyperglycemics (From admission, onward)      Start     Stop Route Frequency Ordered    08/06/24 9860  insulin aspart U-100 pen 0-5 Units         -- SubQ Before meals & nightly PRN 08/06/24 2940        Plan:  -SSI  -A1c  -Accu-checks  -Hold oral hypoglycemics while patient is in the hospital  -Hypoglycemic protocol      "

## 2024-08-07 NOTE — ASSESSMENT & PLAN NOTE
SANTIAGO likely acute on chronic vs progression of CKD. Baseline creatinine is  3.4-3.6 . Most recent creatinine and eGFR are listed below.  Recent Labs     08/06/24  1744   CREATININE 4.3*   EGFRNORACEVR 14.8*      Plan  - SANTIAGO is  currently undergoing medical management  - Avoid nephrotoxins and renally dose meds for GFR listed above  - Monitor urine output, serial BMP, and adjust therapy as needed

## 2024-08-07 NOTE — PLAN OF CARE
[facility] - Tele[facility] (Hospital)  Initial Discharge Assessment       Primary Care Provider: Rigoberto Champion MD    Admission Diagnosis: Chest pressure [R07.89]  NSTEMI (non-ST elevated myocardial infarction) [I21.4]  Leukopenia, unspecified type [D72.819]  Chronic kidney disease, unspecified CKD stage [N18.9]  Anemia due to chronic kidney disease, unspecified CKD stage [N18.9, D63.1]    Admission Date: 8/6/2024  Expected Discharge Date:          Payor: [employer] MANAGED MEDICARE / Plan: [employer] MEDICARE PLAN PPO / Product Type: Medicare Advantage /     Extended Emergency Contact Information  Primary Emergency Contact: Bubba Bragg  Mobile Phone: [phone]  Relation: Son  Secondary Emergency Contact: Mason Rosales  Mobile Phone: [phone]  Relation: Other    Discharge Plan A: Home         [facility] - [place], [place] - [address] [address]  [id number] [address]  Phone: [phone] Fax: [phone]      Initial Assessment (most recent)       Adult Discharge Assessment - 08/07/24 1441          Discharge Assessment    Assessment Type Discharge Planning Assessment     Confirmed/corrected address, phone number and insurance Yes     Confirmed Demographics Correct on Facesheet     Source of Information patient     When was your last doctors appointment? 02/29/24     Communicated KELI with patient/caregiver Date not available/Unable to determine     Reason For Admission Chest pain     People in Home alone     Facility Arrived From: home     Do you expect to return to your current living situation? Yes     Do you have help at home or someone to help you manage your care at home? No     Prior to hospitilization cognitive status: Alert/Oriented     Current cognitive status: Alert/Oriented     Walking or Climbing Stairs Difficulty no     Dressing/Bathing Difficulty no     Equipment Currently Used at Home glucometer     Readmission within 30 days? No     Patient currently being followed by  outpatient case management? No     Do you currently have service(s) that help you manage your care at home? No     Do you take prescription medications? Yes     Do you have prescription coverage? Yes     Coverage [employer] Medicare     Do you have any problems affording any of your prescribed medications? No     Who is going to help you get home at discharge? family     How do you get to doctors appointments? car, drives self     Are you on dialysis? No     Do you take coumadin? No     Discharge Plan A Home     DME Needed Upon Discharge  none     Discharge Plan discussed with: Patient                   Patient lives alone and is independent with ADL's.  He has a nephew and 4 sons who can assist if needed.  Currently no needs.

## 2024-08-07 NOTE — ASSESSMENT & PLAN NOTE
Chronic, controlled. Latest blood pressure and vitals reviewed-     Temp:  [97.6 °F (36.4 °C)-97.9 °F (36.6 °C)]   Pulse:  [60-75]   Resp:  [14-21]   BP: (115-139)/(67-78)   SpO2:  [97 %-100 %] .   Home meds for hypertension were reviewed and noted below.   Hypertension Medications               amlodipine (NORVASC) 2.5 MG tablet Take 2.5 mg by mouth once daily.    carvediloL (COREG) 12.5 MG tablet TAKE 1 TABLET BY MOUTH IN THE MORNING AND 1 IN THE EVENING WITH MEALS    losartan (COZAAR) 25 MG tablet Take 25 mg by mouth once daily.     While in the hospital, will manage blood pressure as follows; Continue home antihypertensive regimen    Will utilize p.r.n. blood pressure medication only if patient's blood pressure greater than 160/100 and he develops symptoms such as worsening chest pain or shortness of breath.

## 2024-08-07 NOTE — H&P
"[facility]  History & Physical    Patient Name: Elie Contreras  MRN: [id number]  Patient Class: IP- Inpatient  Admission Date: 8/6/2024  Attending Physician: Unruly Chang MD   Primary Care Provider: Rigoberto Champion MD         Patient information was obtained from patient, past medical records, and ER records.     Subjective:     Principal Problem:Chest pain    Chief Complaint:   Chief Complaint   Patient presents with    Chest Pain     Described as "when you drink something really cold" in center of chest x a few days. Intermittent. Current episode began a few hours ago.         HPI: Elie Contreras is a 63 y.o. male with a PMH  has a past medical history of Anemia, unspecified, Diabetes mellitus, Hypertension, Mixed hyperlipidemia, and Renal disorder. who presented as a transfer from [facility] for higher level of care after patient initially presented with acute onset and persistent substernal chest pain over the past few days.  Patient presented to outside facility complaining of substernal chest pain which was described as "feeling you get when you drink something really cold", intermittent in frequency, episodes varying in duration, nonradiating, with no known alleviating or aggravating factors noted.  Pain was rated 8/10 in severity at its worst and currently rated 3/10 at time of bedside assessment.  He denied prior history of MIs, heart failure, arrhythmias, acid reflex, or experiencing similar symptoms in the past.  Prior to onset of symptoms, patient reported being in his usual state of health with no other concerns or complaints.  All other review of systems negative except as noted above.  Initial workup in the ED revealed patient to be afebrile without leukocytosis, hemodynamically stable, SANTIAGO on CKD with creatinine/GFR measuring 4.3/14.8, , troponin 0.979, EKG positive for T-wave inversion noted in the inferior lateral leads, and chest x-ray " negative for acute intrathoracic processes.  Cardiology consulted by ED staff and recommended patient be transferred to [place] for continued cardiac observation and patient will be evaluated in the morning.  Patient initiated on NSTEMI pathway and admitted to Hospital Medicine inpatient for continued medical management.    PCP: Rigoberto Champion      Past Medical History:   Diagnosis Date    Anemia, unspecified     Diabetes mellitus     Hypertension     Mixed hyperlipidemia     Renal disorder        Past Surgical History:   Procedure Laterality Date    CHOLECYSTECTOMY      KNEE SURGERY      right       Review of patient's allergies indicates:  No Known Allergies    No current facility-administered medications on file prior to encounter.     Current Outpatient Medications on File Prior to Encounter   Medication Sig    amlodipine (NORVASC) 2.5 MG tablet Take 2.5 mg by mouth once daily.    aspirin (ECOTRIN) 81 MG EC tablet Take 1 tablet (81 mg total) by mouth once daily.    atorvastatin (LIPITOR) 40 MG tablet Take 1 tablet (40 mg total) by mouth every evening.    carvediloL (COREG) 12.5 MG tablet TAKE 1 TABLET BY MOUTH IN THE MORNING AND 1 IN THE EVENING WITH MEALS    cetirizine (ZYRTEC) 10 MG tablet Take 1 tablet (10 mg total) by mouth once daily.    glipiZIDE (GLUCOTROL) 5 MG TR24 Take 5 mg by mouth daily with breakfast.    linaGLIPtin (TRADJENTA) 5 mg Tab tablet Take 1 tablet by mouth once daily.    losartan (COZAAR) 25 MG tablet Take 25 mg by mouth once daily.    metformin (GLUCOPHAGE) 1000 MG tablet Take 1,000 mg by mouth 2 (two) times daily with meals.    sodium bicarbonate 650 MG tablet Take 650 mg by mouth 3 (three) times daily.     Family History    None       Tobacco Use    Smoking status: Former    Smokeless tobacco: Never   Substance and Sexual Activity    Alcohol use: Yes     Comment: occas    Drug use: No    Sexual activity: Yes     Review of Systems   All other systems reviewed and are  negative.    Objective:     Vital Signs (Most Recent):  Temp: 97.8 °F (36.6 °C) (08/06/24 2146)  Pulse: 60 (08/06/24 2147)  Resp: 18 (08/06/24 2146)  BP: 137/78 (08/06/24 2146)  SpO2: 99 % (08/06/24 2146) Vital Signs (24h Range):  Temp:  [97.8 °F (36.6 °C)-97.9 °F (36.6 °C)] 97.8 °F (36.6 °C)  Pulse:  [60-75] 60  Resp:  [14-21] 18  SpO2:  [99 %-100 %] 99 %  BP: (118-139)/(67-78) 137/78     Weight: 104.8 kg (231 lb 0.7 oz)  Body mass index is 29.66 kg/m².     Physical Exam  Vitals reviewed.   Constitutional:       General: He is not in acute distress.     Appearance: Normal appearance. He is obese. He is not ill-appearing, toxic-appearing or diaphoretic.   HENT:      Head: Normocephalic and atraumatic.      Right Ear: External ear normal.      Left Ear: External ear normal.      Nose: Nose normal. No congestion or rhinorrhea.      Mouth/Throat:      Mouth: Mucous membranes are moist.      Pharynx: Oropharynx is clear. No oropharyngeal exudate or posterior oropharyngeal erythema.   Eyes:      General: No scleral icterus.     Extraocular Movements: Extraocular movements intact.      Conjunctiva/sclera: Conjunctivae normal.      Pupils: Pupils are equal, round, and reactive to light.   Neck:      Vascular: No carotid bruit.   Cardiovascular:      Rate and Rhythm: Normal rate and regular rhythm.      Pulses: Normal pulses.      Heart sounds: Normal heart sounds. No murmur heard.     No friction rub. No gallop.   Pulmonary:      Effort: Pulmonary effort is normal. No respiratory distress.      Breath sounds: Normal breath sounds. No stridor. No wheezing, rhonchi or rales.   Chest:      Chest wall: No tenderness.   Abdominal:      General: Abdomen is flat. Bowel sounds are normal. There is no distension.      Palpations: Abdomen is soft. There is no mass.      Tenderness: There is no abdominal tenderness. There is no right CVA tenderness, left CVA tenderness, guarding or rebound.      Hernia: No hernia is present.    Musculoskeletal:         General: No swelling, tenderness, deformity or signs of injury. Normal range of motion.      Cervical back: Normal range of motion and neck supple. No rigidity or tenderness.      Right lower leg: No edema.      Left lower leg: No edema.   Lymphadenopathy:      Cervical: No cervical adenopathy.   Skin:     General: Skin is warm and dry.      Capillary Refill: Capillary refill takes less than 2 seconds.      Coloration: Skin is not jaundiced or pale.      Findings: No bruising, erythema, lesion or rash.   Neurological:      General: No focal deficit present.      Mental Status: He is alert and oriented to person, place, and time. Mental status is at baseline.      Cranial Nerves: No cranial nerve deficit.      Sensory: No sensory deficit.      Motor: No weakness.      Coordination: Coordination normal.      Gait: Gait normal.      Deep Tendon Reflexes: Reflexes normal.   Psychiatric:         Mood and Affect: Mood normal.         Behavior: Behavior normal.         Thought Content: Thought content normal.         Judgment: Judgment normal.              CRANIAL NERVES     CN III, IV, VI   Pupils are equal, round, and reactive to light.       Significant Labs: All pertinent labs within the past 24 hours have been reviewed.    Significant Imaging: I have reviewed all pertinent imaging results/findings within the past 24 hours.    LABS:  Recent Results (from the past 24 hour(s))   CBC Auto Differential    Collection Time: 08/06/24  5:44 PM   Result Value Ref Range    WBC 3.54 (L) 3.90 - 12.70 K/uL    RBC 3.65 (L) 4.60 - 6.20 M/uL    Hemoglobin 10.9 (L) 14.0 - 18.0 g/dL    Hematocrit 32.0 (L) 40.0 - 54.0 %    MCV 88 82 - 98 fL    MCH 29.9 27.0 - 31.0 pg    MCHC 34.1 32.0 - 36.0 g/dL    RDW 12.0 11.5 - 14.5 %    Platelets 155 150 - 450 K/uL    MPV 9.3 9.2 - 12.9 fL    Immature Granulocytes 0.0 0.0 - 0.5 %    Gran # (ANC) 2.2 1.8 - 7.7 K/uL    Immature Grans (Abs) 0.00 0.00 - 0.04 K/uL    Lymph #  0.8 (L) 1.0 - 4.8 K/uL    Mono # 0.4 0.3 - 1.0 K/uL    Eos # 0.1 0.0 - 0.5 K/uL    Baso # 0.03 0.00 - 0.20 K/uL    nRBC 0 0 /100 WBC    Gran % 60.8 38.0 - 73.0 %    Lymph % 23.4 18.0 - 48.0 %    Mono % 11.9 4.0 - 15.0 %    Eosinophil % 3.1 0.0 - 8.0 %    Basophil % 0.8 0.0 - 1.9 %    Differential Method Automated    Comprehensive Metabolic Panel    Collection Time: 08/06/24  5:44 PM   Result Value Ref Range    Sodium 138 136 - 145 mmol/L    Potassium 4.4 3.5 - 5.1 mmol/L    Chloride 108 95 - 110 mmol/L    CO2 21 (L) 23 - 29 mmol/L    Glucose 202 (H) 70 - 110 mg/dL    BUN 46 (H) 8 - 23 mg/dL    Creatinine 4.3 (H) 0.5 - 1.4 mg/dL    Calcium 9.5 8.7 - 10.5 mg/dL    Total Protein 7.3 6.0 - 8.4 g/dL    Albumin 3.6 3.5 - 5.2 g/dL    Total Bilirubin 0.4 0.1 - 1.0 mg/dL    Alkaline Phosphatase 84 55 - 135 U/L    AST 32 10 - 40 U/L    ALT 39 10 - 44 U/L    eGFR 14.8 (A) >60 mL/min/1.73 m^2    Anion Gap 9 8 - 16 mmol/L   BNP    Collection Time: 08/06/24  5:44 PM   Result Value Ref Range    BNP 78 0 - 99 pg/mL   CK    Collection Time: 08/06/24  5:44 PM   Result Value Ref Range     (H) 20 - 200 U/L   Troponin I    Collection Time: 08/06/24  5:44 PM   Result Value Ref Range    Troponin I 0.979 (H) 0.000 - 0.026 ng/mL   APTT    Collection Time: 08/06/24  7:33 PM   Result Value Ref Range    aPTT 25.1 21.0 - 32.0 sec   Protime-INR    Collection Time: 08/06/24  7:33 PM   Result Value Ref Range    Prothrombin Time 10.9 9.0 - 12.5 sec    INR 1.0 0.8 - 1.2       RADIOLOGY  X-Ray Chest AP Portable    Result Date: 8/6/2024  EXAMINATION: XR CHEST AP PORTABLE CLINICAL HISTORY: chest pain; TECHNIQUE: Single frontal portable view of the chest was performed. COMPARISON: 09/01/2023 FINDINGS: The no new pulmonary consolidation pleural effusion or convincing pneumothorax     X no active or adverse finding Electronically signed by: Jana Andersen Date:    08/06/2024 Time:    18:52      EKG    MICROBIOLOGY    [facility]    Assessment/Plan:      * Chest pain    NSTEMI (non-ST elevated myocardial infarction)  Patient presents with NSTEMI. Chest pain is currently uncontrolled. EMY score is 4. Patient is currently on NSTEMI Pathway.    EKG reviewed. Troponins reviewed and results noted-   Recent Labs   Lab 08/06/24  1744   TROPONINI 0.979*     Lipid panel reviewed and shows-     Lab Results   Component Value Date    LDLCALC 124 (H) 02/21/2024     Lab Results   Component Value Date    TRIG 84 02/21/2024     Medical management includes; Dual Anti-Platelet therapy, Anticoagulation, High Intensity Stain, and Nitrate Echo has not been performed but currently pending. Latest ECHO results are as follows- Results for orders placed during the hospital encounter of 12/27/23    Echo    Interpretation Summary    Left Ventricle: The left ventricle is moderately dilated. Mildly increased wall thickness. There is concentric remodeling. Moderate global hypokinesis present. There is moderately reduced systolic function with a visually estimated ejection fraction of 35 - 40%. Biplane (2D) method of discs ejection fraction is 33%.    Right Ventricle: Normal right ventricular cavity size. Wall thickness is normal. Right ventricle wall motion  is normal. Systolic function is normal.    Left Atrium: Left atrium is mildly dilated.    Tricuspid Valve: There is mild regurgitation with a centrally directed jet.    Pulmonary Artery: The estimated pulmonary artery systolic pressure is 25 mmHg.    IVC/SVC: Normal venous pressure at 3 mmHg.    Consult for cardiac rehab is ordered. Patient counseled on lifestyle modifications- begin progressive daily aerobic exercise program, follow a low fat, low cholesterol diet, attempt to lose weight, reduce salt in diet and cooking, reduce exposure to stress, improve dietary compliance, use calcium 1 gram daily with Vit D, continue current medications, continue current healthy lifestyle patterns, and return for routine annual checkups. Cardiology  "is consulted. Plan of care pending with cardiology team. Continue to monitor patient closely and adjust therapy as needed.      Acute kidney injury superimposed on chronic kidney disease  SANTIAGO likely acute on chronic vs progression of CKD. Baseline creatinine is  3.4-3.6 . Most recent creatinine and eGFR are listed below.  Recent Labs     08/06/24 1744   CREATININE 4.3*   EGFRNORACEVR 14.8*      Plan  - SANTIAGO is  currently undergoing medical management  - Avoid nephrotoxins and renally dose meds for GFR listed above  - Monitor urine output, serial BMP, and adjust therapy as needed      Type 2 diabetes mellitus  Patient's FSGs are uncontrolled due to hyperglycemia on current medication regimen.  Last A1c reviewed-   Lab Results   Component Value Date    HGBA1C 7.2 (H) 02/21/2024     Most recent fingerstick glucose reviewed- No results for input(s): "POCTGLUCOSE" in the last 24 hours.  Current correctional scale  Low  Titrate as needed  anti-hyperglycemic dose as follows-   Antihyperglycemics (From admission, onward)      Start     Stop Route Frequency Ordered    08/06/24 2356  insulin aspart U-100 pen 0-5 Units         -- SubQ Before meals & nightly PRN 08/06/24 2256        Plan:  -SSI  -A1c  -Accu-checks  -Hold oral hypoglycemics while patient is in the hospital  -Hypoglycemic protocol        Anemia  Anemia is likely due to chronic disease due to Chronic Kidney Disease. Most recent hemoglobin and hematocrit are listed below.  Recent Labs     08/06/24 1744   HGB 10.9*   HCT 32.0*   Plan  -Monitor serial CBC: Daily  -Transfuse PRBC if patient becomes hemodynamically unstable, symptomatic or H/H drops below 7/21.  -Patient has not received any PRBC transfusions to date  -Patient's anemia is currently stable      Hypertension  Chronic, controlled. Latest blood pressure and vitals reviewed-     Temp:  [97.6 °F (36.4 °C)-97.9 °F (36.6 °C)]   Pulse:  [60-75]   Resp:  [14-21]   BP: (115-139)/(67-78)   SpO2:  [97 %-100 %] . "   Home meds for hypertension were reviewed and noted below.   Hypertension Medications               amlodipine (NORVASC) 2.5 MG tablet Take 2.5 mg by mouth once daily.    carvediloL (COREG) 12.5 MG tablet TAKE 1 TABLET BY MOUTH IN THE MORNING AND 1 IN THE EVENING WITH MEALS    losartan (COZAAR) 25 MG tablet Take 25 mg by mouth once daily.     While in the hospital, will manage blood pressure as follows; Continue home antihypertensive regimen    Will utilize p.r.n. blood pressure medication only if patient's blood pressure greater than 160/100 and he develops symptoms such as worsening chest pain or shortness of breath.      HLD (hyperlipidemia)  Patient is chronically on statin.will continue for now. Last Lipid Panel:   Lab Results   Component Value Date    CHOL 177 02/21/2024    HDL 37 (L) 02/21/2024    LDLCALC 124 (H) 02/21/2024    TRIG 84 02/21/2024   Plan:  -Continue home medication  -low fat/low calorie diet        VTE Risk Mitigation (From admission, onward)           Ordered     Place sequential compression device  Until discontinued         08/06/24 2251     IP VTE LOW RISK PATIENT  Once         08/06/24 2251     heparin 25,000 units in dextrose 5% (100 units/ml) IV bolus from bag LOW INTENSITY nomogram - [facility]  As needed (PRN)        Question:  Heparin Infusion Adjustment (DO NOT MODIFY ANSWER)  Answer:  \\[email or web address]\epic\Images\Pharmacy\HeparinInfusions\heparin LOW INTENSITY nomogram for [facility] [id number]    08/06/24 1925     heparin 25,000 units in dextrose 5% (100 units/ml) IV bolus from bag LOW INTENSITY nomogram - [facility]  As needed (PRN)        Question:  Heparin Infusion Adjustment (DO NOT MODIFY ANSWER)  Answer:  \\[email or web address]\epic\Images\Pharmacy\HeparinInfusions\heparin LOW INTENSITY nomogram for [facility] [id number].pdf    08/06/24 1925     heparin 25,000 units in dextrose 5% 250 mL (100 units/mL) infusion LOW INTENSITY nomogram - [facility]  Continuous        Question:  Begin at (units/kg/hr)  Answer:  12    08/06/24  1925                  //Core Measures   -DVT proph: SCDs, heparin drip  -Code status: Full    -Surrogate: none provided       Components of this note were documented using a voice recognition system and are subject to errors not corrected at the time the document was proof read. Please contact the author for any clarifications.        Unruly Chang MD  Department of Hospital Medicine  [facility] - Tele[facility] ([facility])

## 2024-08-07 NOTE — ASSESSMENT & PLAN NOTE
Patient is chronically on statin.will continue for now. Last Lipid Panel:   Lab Results   Component Value Date    CHOL 177 02/21/2024    HDL 37 (L) 02/21/2024    LDLCALC 124 (H) 02/21/2024    TRIG 84 02/21/2024   Plan:  -Continue home medication  -low fat/low calorie diet

## 2024-08-07 NOTE — SUBJECTIVE & OBJECTIVE
Past Medical History:   Diagnosis Date    Anemia, unspecified     Diabetes mellitus     Hypertension     Mixed hyperlipidemia     Renal disorder        Past Surgical History:   Procedure Laterality Date    CHOLECYSTECTOMY      KNEE SURGERY      right       Review of patient's allergies indicates:  No Known Allergies  Current Facility-Administered Medications   Medication Frequency    0.9%  NaCl infusion Continuous    acetylcysteine 100 mg/ml (10%) solution 600 mg BID    amLODIPine tablet 2.5 mg Daily    aspirin chewable tablet 81 mg Daily    carvediloL tablet 12.5 mg BID WM    clopidogreL tablet 75 mg Daily    dextrose 10% bolus 125 mL 125 mL PRN    dextrose 10% bolus 250 mL 250 mL PRN    glucagon (human recombinant) injection 1 mg PRN    glucose chewable tablet 16 g PRN    glucose chewable tablet 24 g PRN    heparin 25,000 units in dextrose 5% (100 units/ml) IV bolus from bag LOW INTENSITY nomogram - [facility] PRN    heparin 25,000 units in dextrose 5% (100 units/ml) IV bolus from bag LOW INTENSITY nomogram - [facility] PRN    heparin 25,000 units in dextrose 5% 250 mL (100 units/mL) infusion LOW INTENSITY nomogram - [facility] Continuous    insulin aspart U-100 pen 0-5 Units QID (AC + HS) PRN    levothyroxine tablet 50 mcg Before breakfast    nitroGLYCERIN SL tablet 0.4 mg Q5 Min PRN    sodium bicarbonate tablet 650 mg TID     Family History    None       Tobacco Use    Smoking status: Former    Smokeless tobacco: Never   Substance and Sexual Activity    Alcohol use: Yes     Comment: occas    Drug use: No    Sexual activity: Yes     Review of Systems   Constitutional: Negative.    HENT: Negative.     Respiratory: Negative.     Cardiovascular: Negative.    Gastrointestinal: Negative.    Genitourinary: Negative.    Musculoskeletal: Negative.    Neurological: Negative.    Psychiatric/Behavioral: Negative.       Objective:     Vital Signs (Most Recent):  Temp: 97.8 °F (36.6 °C) (08/07/24 1532)  Pulse: 66 (08/07/24 1532)  Resp: 18  (08/07/24 1532)  BP: 122/71 (08/07/24 1532)  SpO2: 98 % (08/07/24 1532) Vital Signs (24h Range):  Temp:  [97.6 °F (36.4 °C)-98.3 °F (36.8 °C)] 97.8 °F (36.6 °C)  Pulse:  [60-71] 66  Resp:  [14-21] 18  SpO2:  [96 %-100 %] 98 %  BP: (110-137)/(67-79) 122/71     Weight: 104.8 kg (231 lb) (08/07/24 0825)  Body mass index is 29.66 kg/m².  Body surface area is 2.34 meters squared.    I/O last 3 completed shifts:  In: 127.7 [I.V.:127.7]  Out: -      Physical Exam  Vitals and nursing note reviewed.   Constitutional:       Appearance: Normal appearance.   Cardiovascular:      Rate and Rhythm: Normal rate and regular rhythm.      Pulses: Normal pulses.      Heart sounds: Normal heart sounds.   Pulmonary:      Breath sounds: Normal breath sounds. No rales.   Abdominal:      Palpations: Abdomen is soft.      Tenderness: There is no abdominal tenderness.   Musculoskeletal:      Right lower leg: No edema.      Left lower leg: No edema.   Neurological:      Mental Status: He is alert and oriented to person, place, and time.   Psychiatric:         Behavior: Behavior normal.          Significant Labs: reviewed  BMP  Lab Results   Component Value Date     08/06/2024    K 4.4 08/06/2024     08/06/2024    CO2 21 (L) 08/06/2024    BUN 46 (H) 08/06/2024    CREATININE 4.3 (H) 08/06/2024    CALCIUM 9.5 08/06/2024    ANIONGAP 9 08/06/2024    EGFRNORACEVR 14.8 (A) 08/06/2024     Lab Results   Component Value Date    WBC 3.83 (L) 08/07/2024    HGB 10.5 (L) 08/07/2024    HCT 32.0 (L) 08/07/2024    MCV 90 08/07/2024     (L) 08/07/2024       Recent Labs   Lab 08/07/24  0513   TROPONINI 6.102*       Significant Imaging: reviewed CXR, clear lungs    Echo reviewed: diastolic dysfunction, has well-preserved EF

## 2024-08-07 NOTE — CONSULTS
Food & Nutrition Education     Diet Education: Cardiac Nutrition Therapy  Time Spent: 15 minutes.  Learners: Patient     Nutrition Education provided with handouts:  Hearty-Heart Nutrition Therapy, Low-Sodium Nutrition Therapy ([email or web address].org)    Patient Active Problem List   Diagnosis    NSTEMI (non-ST elevated myocardial infarction)    Chest pain    Anemia    Acute kidney injury superimposed on chronic kidney disease    Type 2 diabetes mellitus    HLD (hyperlipidemia)    Hypertension     Past Medical History:   Diagnosis Date    Anemia, unspecified     Diabetes mellitus     Hypertension     Mixed hyperlipidemia     Renal disorder      Nutrition Related Social Determinants of Health: SDOH: Unable to afford healthy foods     Comments:  Dietitian educated patient on low sodium, general healthful diet r/t recent hospital diagnosis. Recommended a well-balanced diet with a variety of fresh foods, fruits, and vegetables (5 cups/day), whole grains (3 oz/day), and fat-free or low-fat dairy. Discussed reading food packages, food labels, and nutrition facts labels to identify nutrient content of foods.     Discussed the importance of limiting sodium to less than 2,000 mg per day. Recommended salt free seasonings and other herbs and spices in meals to enhance flavor without additional sodium.     Discussed dietary sources of cholesterol, the importance of incorporating healthy fats into the diet, and avoiding saturated and trans fats for heart health. For a generally healthy diet, aim for total fat less than 25-35% of calories. Discussed alternative vegetable protein options for some of her meal throughout the week (Beans, peas, and lentils)     Discussed the importance of fiber (especially soluble fiber), dietary sources, and a goal intake of >20-30g/day.    The pt remained engaged throughout entire nutrition education. Pt reports that he recently within the last 1 month has become more intentional with being  "consistent to renal and heart healthy diet. He states he has stop consuming fired foods, purchased an air fyer and has significantly decreased consumption of K+ rich food "mashed potatoes". However, the pt reports he only consumes one  big meal daily and eats dessert later in the evening. Educated patient on dietary sources of carbohydrates, carb counting and daily consistent carbohydrate intake. Discussed the importance of carbohydrates in the diet, but with diabetes, focusing on consistent carb intake throughout the day with emphasis on protein and fiber. For a 2,000-calorie diet, aim for 225-275g (45-55%) daily CHO (goal: 3-4 servings (45-60 g) of carbs per meal with snacks in between). The pt states she has no additional questions currently and will reach out through provided contacts if she does. Dietitian will continue to follow pt and monitor the pt nutrition status during current admission.      NFPE not performed, pt appears well nourished.  All questions and concerns answered.  Provided handout with dietitian's contact information.  *Please re-consult as needed.  Thank You!  Flo Garcia MS, Registration Eligible, Provisional LDN  "

## 2024-08-07 NOTE — SUBJECTIVE & OBJECTIVE
Interval History: resting comfortably in bed, no CP or SOB, on Heparin gtt, no bleeding issues. Sitting and ambulating in the room easily. Troponin peaked from 0.89> 2.9> 5.16 > 6.1. pt has NSTEMI and Cards have evaluated the pt and recommended LHC but has Cr was 4.3 on admit, increased from baseline 3.6 last month. Renal consulted. Will add IVF to see if renal function improves.        Review of Systems   Constitutional:  Positive for activity change.   Respiratory:  Positive for shortness of breath.    Cardiovascular:  Positive for chest pain.   All other systems reviewed and are negative.    Objective:     Vital Signs (Most Recent):  Temp: 97.8 °F (36.6 °C) (08/07/24 1532)  Pulse: 66 (08/07/24 1532)  Resp: 18 (08/07/24 1532)  BP: 122/71 (08/07/24 1532)  SpO2: 98 % (08/07/24 1532) Vital Signs (24h Range):  Temp:  [97.6 °F (36.4 °C)-98.3 °F (36.8 °C)] 97.8 °F (36.6 °C)  Pulse:  [60-75] 66  Resp:  [14-21] 18  SpO2:  [96 %-100 %] 98 %  BP: (110-139)/(67-79) 122/71     Weight: 104.8 kg (231 lb)  Body mass index is 29.66 kg/m².    Intake/Output Summary (Last 24 hours) at 8/7/2024 1606  Last data filed at 8/7/2024 0608  Gross per 24 hour   Intake 127.65 ml   Output --   Net 127.65 ml         Physical Exam  Vitals reviewed.   Constitutional:       General: He is not in acute distress.     Appearance: Normal appearance. He is obese. He is not ill-appearing, toxic-appearing or diaphoretic.   HENT:      Head: Normocephalic and atraumatic.      Right Ear: External ear normal.      Left Ear: External ear normal.      Nose: Nose normal. No congestion or rhinorrhea.      Mouth/Throat:      Mouth: Mucous membranes are moist.      Pharynx: Oropharynx is clear. No oropharyngeal exudate or posterior oropharyngeal erythema.   Eyes:      General: No scleral icterus.     Extraocular Movements: Extraocular movements intact.      Conjunctiva/sclera: Conjunctivae normal.      Pupils: Pupils are equal, round, and reactive to light.    Neck:      Vascular: No carotid bruit.   Cardiovascular:      Rate and Rhythm: Normal rate and regular rhythm.      Pulses: Normal pulses.      Heart sounds: Normal heart sounds. No murmur heard.     No friction rub. No gallop.   Pulmonary:      Effort: Pulmonary effort is normal. No respiratory distress.      Breath sounds: Normal breath sounds. No stridor. No wheezing, rhonchi or rales.   Chest:      Chest wall: No tenderness.   Abdominal:      General: Abdomen is flat. Bowel sounds are normal. There is no distension.      Palpations: Abdomen is soft. There is no mass.      Tenderness: There is no abdominal tenderness. There is no right CVA tenderness, left CVA tenderness, guarding or rebound.      Hernia: No hernia is present.   Musculoskeletal:         General: No swelling, tenderness, deformity or signs of injury. Normal range of motion.      Cervical back: Normal range of motion and neck supple. No rigidity or tenderness.      Right lower leg: No edema.      Left lower leg: No edema.   Lymphadenopathy:      Cervical: No cervical adenopathy.   Skin:     General: Skin is warm and dry.      Capillary Refill: Capillary refill takes less than 2 seconds.      Coloration: Skin is not jaundiced or pale.      Findings: No bruising, erythema, lesion or rash.   Neurological:      General: No focal deficit present.      Mental Status: He is alert and oriented to person, place, and time. Mental status is at baseline.      Cranial Nerves: No cranial nerve deficit.      Sensory: No sensory deficit.      Motor: No weakness.      Coordination: Coordination normal.      Gait: Gait normal.      Deep Tendon Reflexes: Reflexes normal.   Psychiatric:         Mood and Affect: Mood normal.         Behavior: Behavior normal.         Thought Content: Thought content normal.         Judgment: Judgment normal.             Significant Labs: All pertinent labs within the past 24 hours have been reviewed.  A1C:   Recent Labs   Lab  "02/21/24  1117 08/06/24  2311   HGBA1C 7.2* 6.9*     CBC:   Recent Labs   Lab 08/06/24  1744 08/07/24  0513   WBC 3.54* 3.83*   HGB 10.9* 10.5*   HCT 32.0* 32.0*    137*     CMP:   Recent Labs   Lab 08/06/24  1744      K 4.4      CO2 21*   *   BUN 46*   CREATININE 4.3*   CALCIUM 9.5   PROT 7.3   ALBUMIN 3.6   BILITOT 0.4   ALKPHOS 84   AST 32   ALT 39   ANIONGAP 9     Cardiac Markers:   Recent Labs   Lab 08/06/24  1744   BNP 78     Coagulation:   Recent Labs   Lab 08/06/24  1933 08/07/24  0402 08/07/24  1247   INR 1.0  --   --    APTT 25.1   < > 26.5    < > = values in this interval not displayed.     Magnesium: No results for input(s): "MG" in the last 48 hours.  POCT Glucose:   Recent Labs   Lab 08/07/24  0436 08/07/24  1126   POCTGLUCOSE 79 193*     Troponin:   Recent Labs   Lab 08/06/24  2310 08/07/24  0235 08/07/24  0513   TROPONINI 2.894* 5.159* 6.102*     TSH:   Recent Labs   Lab 02/29/24  1031   TSH 6.610*       Significant Imaging: I have reviewed all pertinent imaging results/findings within the past 24 hours.  "

## 2024-08-07 NOTE — ASSESSMENT & PLAN NOTE
Pt has mild SANTIAGO on advanced CKD stage 4  H/o of DM and HTN  SANTIAGO likely due to pre-renal azotemia from the effect of the MI  Has proteinuria  CKD likely due to diabetic nephropathy and HTN    Risk of contrast nephropathy was discussed with pt in details  This risk is not definite, and with the new generation of dyes will be unlikely to occur  Without LHC, CHF will worsen.  Agree with cardiology  Pt understands the risk and accepts to do the procedure    Recommend:  Continue NS IVF's at 50-75 ml/hr  Mucomyst 600 mg po bid x 2 doses before and 2 doses post contrast  Hold diuretics on day of LHC

## 2024-08-07 NOTE — HOSPITAL COURSE
"63 y.o. AAM, hx HTN, HLP, DM 2 w CKD 4, who was transferred from [facility] where he initially presented with acute onset and persistent substernal chest pain over the past few days. CP described as "feeling you get when you drink something really cold", intermittent in frequency, episodes varying in duration, nonradiating, with no known alleviating or aggravating factors noted.  Pain was rated 8/10 in severity at its worst and currently rated 3/10 at time of bedside assessment. He denied prior history of MIs, heart failure, arrhythmias, acid reflex, or experiencing similar symptoms in the past. Initial workup- Afebrile without leukocytosis, hemodynamically stable, SANTIAGO on CKD with Cr/GFR measuring 4.3/14.8, , troponin 0.979, EKG positive for T-wave inversion in the inferior lateral leads, and chest x-ray negative for acute intrathoracic processes. Cardiology consulted by ED staff and recommended patient be transferred to [place] for continued cardiac observation. Patient initiated on NSTEMI pathway- Heparin gtt and admitted to [facility] inpatient for continued medical management.    8/7- resting comfortably in bed, no CP or SOB, on Heparin gtt, no bleeding issues. Sitting and ambulating in the room easily. Troponin peaked from 0.89> 2.9> 5.16 > 6.1. pt has NSTEMI and Cards have evaluated the pt and recommended LHC but has Cr was 4.3 on admit, increased from baseline 3.6 last month. Renal consulted. Will add IVF to see if renal function improves.      8/8- looks and feels much better, comfortably lying in bed, family surrounding him. Pt seen and and post LHC- non Obst CAD- doing well, getting IVF. Will watch overnight.     8/9- Appreciate Dr. Fierro. Pt rested well overnite, no CP or SOB. Tolerated LHC well, IVF continued post cath, no CP or SOB. Dr. Fierro evaluated and cleared him for discharge. LHC showed non Obst CAD but showed EF low- 35-40%. Cardiac meds have been adjusted and we have " changed Glipizide to Farxiga 10 po daily. Continue Tradjenta 5 daily. Pt counseled about daily salt and fluid restrictions which he understands and accepts. He was seen and examined and deemed stable for discharge home today.

## 2024-08-07 NOTE — ASSESSMENT & PLAN NOTE
Pt has heart disease  CAD, NSTEMI  Diastolic CHF. May have has silent MI's in the past, which is common in diabetics  Risk for heart disease in this pt is DM and being male, pt is non-smoker  IV contrast use recommendations as above

## 2024-08-07 NOTE — CONSULTS
[facility] ([facility])  Cardiology  Consult Note    Patient Name: Elie Contreras  MRN: [id number]  Admission Date: 8/6/2024  Hospital Length of Stay: 1 days  Code Status: Full Code   Attending Provider: Matt Diallo MD   Consulting Provider: Jhonny Fierro MD  Primary Care Physician: Rigoberto Champion MD  Principal Problem:Chest pain    Patient information was obtained from patient and ER records.     Inpatient consult to Cardiology  Consult performed by: Jhonny Fierro MD  Consult ordered by: Tammi Graves DO        Subjective:     Chief Complaint:  CP     HPI:   63 y.o. male with a PMH has a past medical history of Anemia, unspecified, Diabetes mellitus, Hypertension, Mixed hyperlipidemia, and CKD probably stage 4 ( Cr 4.3)  Admitted for NSTEMI. Peak CK 6.01. EKG no changes NSR, st-t changes inferolaterally. CP is atypical though with recent exertional sx.No sx currently    Past Medical History:   Diagnosis Date    Anemia, unspecified     Diabetes mellitus     Hypertension     Mixed hyperlipidemia     Renal disorder        Past Surgical History:   Procedure Laterality Date    CHOLECYSTECTOMY      KNEE SURGERY      right       Review of patient's allergies indicates:  No Known Allergies    No current facility-administered medications on file prior to encounter.     Current Outpatient Medications on File Prior to Encounter   Medication Sig    amlodipine (NORVASC) 2.5 MG tablet Take 2.5 mg by mouth once daily.    aspirin (ECOTRIN) 81 MG EC tablet Take 1 tablet (81 mg total) by mouth once daily.    carvediloL (COREG) 12.5 MG tablet TAKE 1 TABLET BY MOUTH IN THE MORNING AND 1 IN THE EVENING WITH MEALS    glipiZIDE (GLUCOTROL) 5 MG TR24 Take 5 mg by mouth daily with breakfast.    linaGLIPtin (TRADJENTA) 5 mg Tab tablet Take 1 tablet by mouth once daily.    losartan (COZAAR) 25 MG tablet Take 25 mg by mouth once daily.    sodium bicarbonate 650 MG tablet Take 650 mg by mouth 3 (three) times  daily.    atorvastatin (LIPITOR) 40 MG tablet Take 1 tablet (40 mg total) by mouth every evening.    cetirizine (ZYRTEC) 10 MG tablet Take 1 tablet (10 mg total) by mouth once daily.    metformin (GLUCOPHAGE) 1000 MG tablet Take 1,000 mg by mouth 2 (two) times daily with meals.     Family History    None       Tobacco Use    Smoking status: Former    Smokeless tobacco: Never   Substance and Sexual Activity    Alcohol use: Yes     Comment: occas    Drug use: No    Sexual activity: Yes     Review of Systems   Constitutional: Negative. Negative for weight gain.   HENT: Negative.     Eyes: Negative.    Cardiovascular: Negative.  Negative for chest pain, leg swelling and palpitations.   Respiratory: Negative.  Negative for shortness of breath.    Endocrine: Negative.    Hematologic/Lymphatic: Negative.    Skin: Negative.    Musculoskeletal:  Negative for muscle weakness.   Gastrointestinal: Negative.    Genitourinary: Negative.    Neurological: Negative.  Negative for dizziness.   Psychiatric/Behavioral: Negative.     Allergic/Immunologic: Negative.    All other systems reviewed and are negative.    Objective:     Vital Signs (Most Recent):  Temp: 97.8 °F (36.6 °C) (08/07/24 0727)  Pulse: 67 (08/07/24 0825)  Resp: 17 (08/07/24 0727)  BP: 117/79 (08/07/24 0727)  SpO2: 97 % (08/07/24 0727) Vital Signs (24h Range):  Temp:  [97.6 °F (36.4 °C)-97.9 °F (36.6 °C)] 97.8 °F (36.6 °C)  Pulse:  [60-75] 67  Resp:  [14-21] 17  SpO2:  [96 %-100 %] 97 %  BP: (115-139)/(67-79) 117/79     Weight: 104.8 kg (231 lb 0.7 oz)  Body mass index is 29.66 kg/m².    SpO2: 97 %         Intake/Output Summary (Last 24 hours) at 8/7/2024 0839  Last data filed at 8/7/2024 0608  Gross per 24 hour   Intake 127.65 ml   Output --   Net 127.65 ml       Lines/Drains/Airways       Peripheral Intravenous Line  Duration                  Peripheral IV - Single Lumen 08/06/24 1744 20 G Anterior;Left;Proximal Forearm <1 day                     Physical  Exam  Vitals and nursing note reviewed.   Constitutional:       Appearance: He is well-developed.   HENT:      Head: Normocephalic and atraumatic.   Eyes:      Conjunctiva/sclera: Conjunctivae normal.      Pupils: Pupils are equal, round, and reactive to light.   Cardiovascular:      Rate and Rhythm: Normal rate and regular rhythm.      Pulses: Intact distal pulses.      Heart sounds: Normal heart sounds.   Pulmonary:      Effort: Pulmonary effort is normal.      Breath sounds: Normal breath sounds.   Abdominal:      General: Bowel sounds are normal.      Palpations: Abdomen is soft.   Musculoskeletal:      Cervical back: Normal range of motion and neck supple.   Skin:     General: Skin is warm and dry.   Neurological:      Mental Status: He is alert and oriented to person, place, and time.          Significant Labs: All pertinent lab results from the last 24 hours have been reviewed.    Significant Imaging: X-Ray: CXR: X-Ray Chest 1 View (CXR): No results found for this visit on 08/06/24.  Assessment and Plan:     NSTEMI (non-ST elevated myocardial infarction)  63 y.o. male with a PMH has a past medical history of Anemia, unspecified, Diabetes mellitus, Hypertension, Mixed hyperlipidemia, and CKD probably stage 4 ( Cr 4.3)echocardiogram EF 35-40%, NMT/stress 1-24 with fixed defect  Admitted for NSTEMI. Peak CK 6.01. EKG no changes NSR, st-t changes inferolaterally. CP is atypical though with recent exertional sx.No sx currently    Recommend LHC but high risk for contrast nephropathy, will need renal clearance  Continue IV heparin, asa, b blockers        VTE Risk Mitigation (From admission, onward)           Ordered     Place sequential compression device  Until discontinued         08/06/24 2251     IP VTE LOW RISK PATIENT  Once         08/06/24 2251     heparin 25,000 units in dextrose 5% (100 units/ml) IV bolus from bag LOW INTENSITY nomogram - [facility]  As needed (PRN)        Question:  Heparin Infusion Adjustment  (DO NOT MODIFY ANSWER)  Answer:  [email or web address]\epic\Images\Pharmacy\HeparinInfusions\heparin LOW INTENSITY nomogram for [facility] [id number]    08/06/24 1925     heparin 25,000 units in dextrose 5% (100 units/ml) IV bolus from bag LOW INTENSITY nomogram - [facility]  As needed (PRN)        Question:  Heparin Infusion Adjustment (DO NOT MODIFY ANSWER)  Answer:  [email or web address]\epic\Images\Pharmacy\HeparinInfusions\heparin LOW INTENSITY nomogram for [facility] [id number].pdf    08/06/24 1925     heparin 25,000 units in dextrose 5% 250 mL (100 units/mL) infusion LOW INTENSITY nomogram - [facility]  Continuous        Question:  Begin at (units/kg/hr)  Answer:  12    08/06/24 1925                    Thank you for your consult. I will follow-up with patient. Please contact us if you have any additional questions.    Jhonny Fierro MD  Cardiology   [facility] ([facility])

## 2024-08-07 NOTE — ASSESSMENT & PLAN NOTE
Patient's FSGs are uncontrolled due to hyperglycemia on current medication regimen.  Last A1c reviewed-   Lab Results   Component Value Date    HGBA1C 6.9 (H) 08/06/2024     Most recent fingerstick glucose reviewed-   Recent Labs   Lab 08/07/24  0436 08/07/24  1126   POCTGLUCOSE 79 193*     Current correctional scale  Low  Titrate as needed  anti-hyperglycemic dose as follows-   Antihyperglycemics (From admission, onward)      Start     Stop Route Frequency Ordered    08/06/24 2356  insulin aspart U-100 pen 0-5 Units         -- SubQ Before meals & nightly PRN 08/06/24 2256        Plan:  -SSI  -A1c  -Accu-checks  -Hold oral hypoglycemics while patient is in the hospital  -Hypoglycemic protocol      BS under control

## 2024-08-07 NOTE — SUBJECTIVE & OBJECTIVE
Past Medical History:   Diagnosis Date    Anemia, unspecified     Diabetes mellitus     Hypertension     Mixed hyperlipidemia     Renal disorder        Past Surgical History:   Procedure Laterality Date    CHOLECYSTECTOMY      KNEE SURGERY      right       Review of patient's allergies indicates:  No Known Allergies    No current facility-administered medications on file prior to encounter.     Current Outpatient Medications on File Prior to Encounter   Medication Sig    amlodipine (NORVASC) 2.5 MG tablet Take 2.5 mg by mouth once daily.    aspirin (ECOTRIN) 81 MG EC tablet Take 1 tablet (81 mg total) by mouth once daily.    carvediloL (COREG) 12.5 MG tablet TAKE 1 TABLET BY MOUTH IN THE MORNING AND 1 IN THE EVENING WITH MEALS    glipiZIDE (GLUCOTROL) 5 MG TR24 Take 5 mg by mouth daily with breakfast.    linaGLIPtin (TRADJENTA) 5 mg Tab tablet Take 1 tablet by mouth once daily.    losartan (COZAAR) 25 MG tablet Take 25 mg by mouth once daily.    sodium bicarbonate 650 MG tablet Take 650 mg by mouth 3 (three) times daily.    atorvastatin (LIPITOR) 40 MG tablet Take 1 tablet (40 mg total) by mouth every evening.    cetirizine (ZYRTEC) 10 MG tablet Take 1 tablet (10 mg total) by mouth once daily.    metformin (GLUCOPHAGE) 1000 MG tablet Take 1,000 mg by mouth 2 (two) times daily with meals.     Family History    None       Tobacco Use    Smoking status: Former    Smokeless tobacco: Never   Substance and Sexual Activity    Alcohol use: Yes     Comment: occas    Drug use: No    Sexual activity: Yes     Review of Systems   Constitutional: Negative. Negative for weight gain.   HENT: Negative.     Eyes: Negative.    Cardiovascular: Negative.  Negative for chest pain, leg swelling and palpitations.   Respiratory: Negative.  Negative for shortness of breath.    Endocrine: Negative.    Hematologic/Lymphatic: Negative.    Skin: Negative.    Musculoskeletal:  Negative for muscle weakness.   Gastrointestinal: Negative.     Genitourinary: Negative.    Neurological: Negative.  Negative for dizziness.   Psychiatric/Behavioral: Negative.     Allergic/Immunologic: Negative.    All other systems reviewed and are negative.    Objective:     Vital Signs (Most Recent):  Temp: 97.8 °F (36.6 °C) (08/07/24 0727)  Pulse: 67 (08/07/24 0825)  Resp: 17 (08/07/24 0727)  BP: 117/79 (08/07/24 0727)  SpO2: 97 % (08/07/24 0727) Vital Signs (24h Range):  Temp:  [97.6 °F (36.4 °C)-97.9 °F (36.6 °C)] 97.8 °F (36.6 °C)  Pulse:  [60-75] 67  Resp:  [14-21] 17  SpO2:  [96 %-100 %] 97 %  BP: (115-139)/(67-79) 117/79     Weight: 104.8 kg (231 lb 0.7 oz)  Body mass index is 29.66 kg/m².    SpO2: 97 %         Intake/Output Summary (Last 24 hours) at 8/7/2024 0839  Last data filed at 8/7/2024 0608  Gross per 24 hour   Intake 127.65 ml   Output --   Net 127.65 ml       Lines/Drains/Airways       Peripheral Intravenous Line  Duration                  Peripheral IV - Single Lumen 08/06/24 1744 20 G Anterior;Left;Proximal Forearm <1 day                     Physical Exam  Vitals and nursing note reviewed.   Constitutional:       Appearance: He is well-developed.   HENT:      Head: Normocephalic and atraumatic.   Eyes:      Conjunctiva/sclera: Conjunctivae normal.      Pupils: Pupils are equal, round, and reactive to light.   Cardiovascular:      Rate and Rhythm: Normal rate and regular rhythm.      Pulses: Intact distal pulses.      Heart sounds: Normal heart sounds.   Pulmonary:      Effort: Pulmonary effort is normal.      Breath sounds: Normal breath sounds.   Abdominal:      General: Bowel sounds are normal.      Palpations: Abdomen is soft.   Musculoskeletal:      Cervical back: Normal range of motion and neck supple.   Skin:     General: Skin is warm and dry.   Neurological:      Mental Status: He is alert and oriented to person, place, and time.          Significant Labs: All pertinent lab results from the last 24 hours have been reviewed.    Significant Imaging:  X-Ray: CXR: X-Ray Chest 1 View (CXR): No results found for this visit on 08/06/24.

## 2024-08-07 NOTE — HPI
63 y.o. male with a PMH has a past medical history of Anemia, unspecified, Diabetes mellitus, Hypertension, Mixed hyperlipidemia, and CKD probably stage 4 ( Cr 4.3)  Admitted for NSTEMI. Peak CK 6.01. EKG no changes NSR, st-t changes inferolaterally. CP is atypical though with recent exertional sx.No sx currently

## 2024-08-07 NOTE — HPI
Thank you for referring the pt to us. H/o and chart were reviewed. Pt was seen and examined. Pt is a 62 y/o male with CKD stage 4, DM-2, HTN, CAD, and diastolic CHF, who presented with NSTEMI. Pt currently feels well, no CP, no SOB. S Cr has slightly worsened from baseline. Pt is a non-smoker. Cardiology is recommending urgent LHC, but renal function remains a concern. Fully discussed with pt the use of Iv contrast and his renal function.

## 2024-08-07 NOTE — ASSESSMENT & PLAN NOTE
Anemia is likely due to chronic disease due to Chronic Kidney Disease. Most recent hemoglobin and hematocrit are listed below.  Recent Labs     08/06/24  1744   HGB 10.9*   HCT 32.0*   Plan  -Monitor serial CBC: Daily  -Transfuse PRBC if patient becomes hemodynamically unstable, symptomatic or H/H drops below 7/21.  -Patient has not received any PRBC transfusions to date  -Patient's anemia is currently stable

## 2024-08-07 NOTE — ASSESSMENT & PLAN NOTE
Patient presents with NSTEMI. Chest pain is currently uncontrolled. EMY score is 4. Patient is currently on NSTEMI Pathway.    EKG reviewed. Troponins reviewed and results noted-   Recent Labs   Lab 08/07/24  0513   TROPONINI 6.102*       Lipid panel reviewed and shows-     Lab Results   Component Value Date    LDLCALC 51.6 (L) 08/06/2024     Lab Results   Component Value Date    TRIG 52 08/06/2024     Medical management includes; Dual Anti-Platelet therapy, Anticoagulation, High Intensity Stain, and Nitrate Echo has not been performed but currently pending. Latest ECHO results are as follows- Results for orders placed during the hospital encounter of 12/27/23    Echo    Interpretation Summary    Left Ventricle: The left ventricle is moderately dilated. Mildly increased wall thickness. There is concentric remodeling. Moderate global hypokinesis present. There is moderately reduced systolic function with a visually estimated ejection fraction of 35 - 40%. Biplane (2D) method of discs ejection fraction is 33%.    Right Ventricle: Normal right ventricular cavity size. Wall thickness is normal. Right ventricle wall motion  is normal. Systolic function is normal.    Left Atrium: Left atrium is mildly dilated.    Tricuspid Valve: There is mild regurgitation with a centrally directed jet.    Pulmonary Artery: The estimated pulmonary artery systolic pressure is 25 mmHg.    IVC/SVC: Normal venous pressure at 3 mmHg.    Consult for cardiac rehab is ordered. Patient counseled on lifestyle modifications- begin progressive daily aerobic exercise program, follow a low fat, low cholesterol diet, attempt to lose weight, reduce salt in diet and cooking, reduce exposure to stress, improve dietary compliance, use calcium 1 gram daily with Vit D, continue current medications, continue current healthy lifestyle patterns, and return for routine annual checkups. Cardiology is consulted. Plan of care pending with cardiology team. Continue  to monitor patient closely and adjust therapy as needed.    Atypical CP associated with Positive and rising Troponin- treat as NSTEMI- cont Heparin gtt  Cards following and rec LHC but Cr very high- Renal consulted

## 2024-08-08 LAB
ANION GAP SERPL CALC-SCNC: 10 MMOL/L (ref 8–16)
APTT PPP: 36.1 SEC (ref 21–32)
APTT PPP: 80.6 SEC (ref 21–32)
BASOPHILS # BLD AUTO: 0.02 K/UL (ref 0–0.2)
BASOPHILS NFR BLD: 0.5 % (ref 0–1.9)
BUN SERPL-MCNC: 49 MG/DL (ref 8–23)
CALCIUM SERPL-MCNC: 9.1 MG/DL (ref 8.7–10.5)
CHLORIDE SERPL-SCNC: 111 MMOL/L (ref 95–110)
CO2 SERPL-SCNC: 19 MMOL/L (ref 23–29)
CREAT SERPL-MCNC: 3.3 MG/DL (ref 0.5–1.4)
DIFFERENTIAL METHOD BLD: ABNORMAL
EOSINOPHIL # BLD AUTO: 0.1 K/UL (ref 0–0.5)
EOSINOPHIL NFR BLD: 3.1 % (ref 0–8)
ERYTHROCYTE [DISTWIDTH] IN BLOOD BY AUTOMATED COUNT: 11.9 % (ref 11.5–14.5)
EST. GFR  (NO RACE VARIABLE): 20 ML/MIN/1.73 M^2
GLUCOSE SERPL-MCNC: 141 MG/DL (ref 70–110)
HCT VFR BLD AUTO: 30.4 % (ref 40–54)
HGB BLD-MCNC: 10.1 G/DL (ref 14–18)
IMM GRANULOCYTES # BLD AUTO: 0.01 K/UL (ref 0–0.04)
IMM GRANULOCYTES NFR BLD AUTO: 0.2 % (ref 0–0.5)
LYMPHOCYTES # BLD AUTO: 0.8 K/UL (ref 1–4.8)
LYMPHOCYTES NFR BLD: 18.4 % (ref 18–48)
MCH RBC QN AUTO: 29.7 PG (ref 27–31)
MCHC RBC AUTO-ENTMCNC: 33.2 G/DL (ref 32–36)
MCV RBC AUTO: 89 FL (ref 82–98)
MONOCYTES # BLD AUTO: 0.4 K/UL (ref 0.3–1)
MONOCYTES NFR BLD: 8.7 % (ref 4–15)
NEUTROPHILS # BLD AUTO: 2.9 K/UL (ref 1.8–7.7)
NEUTROPHILS NFR BLD: 69.1 % (ref 38–73)
NRBC BLD-RTO: 0 /100 WBC
PLATELET # BLD AUTO: 126 K/UL (ref 150–450)
PMV BLD AUTO: 9.1 FL (ref 9.2–12.9)
POCT GLUCOSE: 112 MG/DL (ref 70–110)
POCT GLUCOSE: 121 MG/DL (ref 70–110)
POCT GLUCOSE: 127 MG/DL (ref 70–110)
POCT GLUCOSE: 205 MG/DL (ref 70–110)
POCT GLUCOSE: 99 MG/DL (ref 70–110)
POTASSIUM SERPL-SCNC: 4.6 MMOL/L (ref 3.5–5.1)
RBC # BLD AUTO: 3.4 M/UL (ref 4.6–6.2)
SODIUM SERPL-SCNC: 140 MMOL/L (ref 136–145)
TROPONIN I SERPL DL<=0.01 NG/ML-MCNC: 3.61 NG/ML (ref 0–0.03)
WBC # BLD AUTO: 4.23 K/UL (ref 3.9–12.7)

## 2024-08-08 PROCEDURE — 99152 MOD SED SAME PHYS/QHP 5/>YRS: CPT | Mod: ,,, | Performed by: INTERNAL MEDICINE

## 2024-08-08 PROCEDURE — 4A023N7 MEASUREMENT OF CARDIAC SAMPLING AND PRESSURE, LEFT HEART, PERCUTANEOUS APPROACH: ICD-10-PCS | Performed by: INTERNAL MEDICINE

## 2024-08-08 PROCEDURE — 99233 SBSQ HOSP IP/OBS HIGH 50: CPT | Mod: ,,, | Performed by: INTERNAL MEDICINE

## 2024-08-08 PROCEDURE — 93458 L HRT ARTERY/VENTRICLE ANGIO: CPT | Mod: 26,,, | Performed by: INTERNAL MEDICINE

## 2024-08-08 PROCEDURE — C1894 INTRO/SHEATH, NON-LASER: HCPCS | Performed by: INTERNAL MEDICINE

## 2024-08-08 PROCEDURE — 85025 COMPLETE CBC W/AUTO DIFF WBC: CPT | Performed by: EMERGENCY MEDICINE

## 2024-08-08 PROCEDURE — 36415 COLL VENOUS BLD VENIPUNCTURE: CPT | Performed by: EMERGENCY MEDICINE

## 2024-08-08 PROCEDURE — 25000003 PHARM REV CODE 250: Performed by: EMERGENCY MEDICINE

## 2024-08-08 PROCEDURE — 84484 ASSAY OF TROPONIN QUANT: CPT | Performed by: EMERGENCY MEDICINE

## 2024-08-08 PROCEDURE — 93010 ELECTROCARDIOGRAM REPORT: CPT | Mod: ,,, | Performed by: INTERNAL MEDICINE

## 2024-08-08 PROCEDURE — 27201423 OPTIME MED/SURG SUP & DEVICES STERILE SUPPLY: Performed by: INTERNAL MEDICINE

## 2024-08-08 PROCEDURE — 25000242 PHARM REV CODE 250 ALT 637 W/ HCPCS: Performed by: INTERNAL MEDICINE

## 2024-08-08 PROCEDURE — 85730 THROMBOPLASTIN TIME PARTIAL: CPT | Performed by: EMERGENCY MEDICINE

## 2024-08-08 PROCEDURE — B2151ZZ FLUOROSCOPY OF LEFT HEART USING LOW OSMOLAR CONTRAST: ICD-10-PCS | Performed by: INTERNAL MEDICINE

## 2024-08-08 PROCEDURE — 25000003 PHARM REV CODE 250: Performed by: INTERNAL MEDICINE

## 2024-08-08 PROCEDURE — 25000003 PHARM REV CODE 250: Performed by: HOSPITALIST

## 2024-08-08 PROCEDURE — C1760 CLOSURE DEV, VASC: HCPCS | Performed by: INTERNAL MEDICINE

## 2024-08-08 PROCEDURE — B2111ZZ FLUOROSCOPY OF MULTIPLE CORONARY ARTERIES USING LOW OSMOLAR CONTRAST: ICD-10-PCS | Performed by: INTERNAL MEDICINE

## 2024-08-08 PROCEDURE — 25500020 PHARM REV CODE 255: Performed by: INTERNAL MEDICINE

## 2024-08-08 PROCEDURE — C1769 GUIDE WIRE: HCPCS | Performed by: INTERNAL MEDICINE

## 2024-08-08 PROCEDURE — 93458 L HRT ARTERY/VENTRICLE ANGIO: CPT | Performed by: INTERNAL MEDICINE

## 2024-08-08 PROCEDURE — 93005 ELECTROCARDIOGRAM TRACING: CPT

## 2024-08-08 PROCEDURE — 99232 SBSQ HOSP IP/OBS MODERATE 35: CPT | Mod: ,,, | Performed by: INTERNAL MEDICINE

## 2024-08-08 PROCEDURE — 63600175 PHARM REV CODE 636 W HCPCS: Performed by: HOSPITALIST

## 2024-08-08 PROCEDURE — 63600175 PHARM REV CODE 636 W HCPCS: Performed by: INTERNAL MEDICINE

## 2024-08-08 PROCEDURE — 99152 MOD SED SAME PHYS/QHP 5/>YRS: CPT | Performed by: INTERNAL MEDICINE

## 2024-08-08 PROCEDURE — 21400001 HC TELEMETRY ROOM

## 2024-08-08 PROCEDURE — 80048 BASIC METABOLIC PNL TOTAL CA: CPT | Performed by: EMERGENCY MEDICINE

## 2024-08-08 RX ORDER — MIDAZOLAM HYDROCHLORIDE 1 MG/ML
INJECTION INTRAMUSCULAR; INTRAVENOUS
Status: DISCONTINUED | OUTPATIENT
Start: 2024-08-08 | End: 2024-08-08 | Stop reason: HOSPADM

## 2024-08-08 RX ORDER — ISOSORBIDE MONONITRATE 30 MG/1
30 TABLET, EXTENDED RELEASE ORAL DAILY
Status: DISCONTINUED | OUTPATIENT
Start: 2024-08-09 | End: 2024-08-09 | Stop reason: HOSPADM

## 2024-08-08 RX ORDER — SODIUM CHLORIDE 9 MG/ML
INJECTION, SOLUTION INTRAVENOUS CONTINUOUS
Status: ACTIVE | OUTPATIENT
Start: 2024-08-08 | End: 2024-08-08

## 2024-08-08 RX ORDER — ONDANSETRON 8 MG/1
8 TABLET, ORALLY DISINTEGRATING ORAL EVERY 8 HOURS PRN
Status: DISCONTINUED | OUTPATIENT
Start: 2024-08-08 | End: 2024-08-09 | Stop reason: HOSPADM

## 2024-08-08 RX ORDER — NITROGLYCERIN 0.4 MG/1
0.4 TABLET SUBLINGUAL EVERY 5 MIN PRN
Status: DISCONTINUED | OUTPATIENT
Start: 2024-08-08 | End: 2024-08-09 | Stop reason: HOSPADM

## 2024-08-08 RX ORDER — SODIUM CHLORIDE 0.9 % (FLUSH) 0.9 %
10 SYRINGE (ML) INJECTION
Status: DISCONTINUED | OUTPATIENT
Start: 2024-08-08 | End: 2024-08-09 | Stop reason: HOSPADM

## 2024-08-08 RX ORDER — DIPHENHYDRAMINE HCL 50 MG
50 CAPSULE ORAL ONCE
Status: COMPLETED | OUTPATIENT
Start: 2024-08-08 | End: 2024-08-08

## 2024-08-08 RX ORDER — SODIUM CHLORIDE 9 MG/ML
INJECTION, SOLUTION INTRAVENOUS CONTINUOUS
Status: ACTIVE | OUTPATIENT
Start: 2024-08-08 | End: 2024-08-09

## 2024-08-08 RX ORDER — OXYCODONE HYDROCHLORIDE 5 MG/1
10 TABLET ORAL EVERY 4 HOURS PRN
Status: DISCONTINUED | OUTPATIENT
Start: 2024-08-08 | End: 2024-08-09 | Stop reason: HOSPADM

## 2024-08-08 RX ORDER — FENTANYL CITRATE 50 UG/ML
INJECTION, SOLUTION INTRAMUSCULAR; INTRAVENOUS
Status: DISCONTINUED | OUTPATIENT
Start: 2024-08-08 | End: 2024-08-08 | Stop reason: HOSPADM

## 2024-08-08 RX ORDER — HYDROCODONE BITARTRATE AND ACETAMINOPHEN 5; 325 MG/1; MG/1
1 TABLET ORAL EVERY 4 HOURS PRN
Status: DISCONTINUED | OUTPATIENT
Start: 2024-08-08 | End: 2024-08-09 | Stop reason: HOSPADM

## 2024-08-08 RX ORDER — ATROPINE SULFATE 0.1 MG/ML
0.5 INJECTION INTRAVENOUS
Status: DISCONTINUED | OUTPATIENT
Start: 2024-08-08 | End: 2024-08-09 | Stop reason: HOSPADM

## 2024-08-08 RX ORDER — LIDOCAINE HYDROCHLORIDE 20 MG/ML
INJECTION, SOLUTION EPIDURAL; INFILTRATION; INTRACAUDAL; PERINEURAL
Status: DISCONTINUED | OUTPATIENT
Start: 2024-08-08 | End: 2024-08-08 | Stop reason: HOSPADM

## 2024-08-08 RX ORDER — CEFAZOLIN SODIUM 1 G/3ML
INJECTION, POWDER, FOR SOLUTION INTRAMUSCULAR; INTRAVENOUS
Status: DISCONTINUED | OUTPATIENT
Start: 2024-08-08 | End: 2024-08-08 | Stop reason: ALTCHOICE

## 2024-08-08 RX ORDER — ACETAMINOPHEN 325 MG/1
650 TABLET ORAL EVERY 4 HOURS PRN
Status: DISCONTINUED | OUTPATIENT
Start: 2024-08-08 | End: 2024-08-09 | Stop reason: HOSPADM

## 2024-08-08 RX ADMIN — SODIUM BICARBONATE 650 MG TABLET 650 MG: at 02:08

## 2024-08-08 RX ADMIN — SODIUM BICARBONATE 650 MG TABLET 650 MG: at 08:08

## 2024-08-08 RX ADMIN — ACETYLCYSTEINE 600 MG: 100 INHALANT RESPIRATORY (INHALATION) at 11:08

## 2024-08-08 RX ADMIN — INSULIN ASPART 1 UNITS: 100 INJECTION, SOLUTION INTRAVENOUS; SUBCUTANEOUS at 09:08

## 2024-08-08 RX ADMIN — ASPIRIN 81 MG CHEWABLE TABLET 81 MG: 81 TABLET CHEWABLE at 08:08

## 2024-08-08 RX ADMIN — DIPHENHYDRAMINE HYDROCHLORIDE 50 MG: 50 CAPSULE ORAL at 08:08

## 2024-08-08 RX ADMIN — SODIUM BICARBONATE 650 MG TABLET 650 MG: at 09:08

## 2024-08-08 RX ADMIN — AMLODIPINE BESYLATE 2.5 MG: 2.5 TABLET ORAL at 08:08

## 2024-08-08 RX ADMIN — SODIUM CHLORIDE: 9 INJECTION, SOLUTION INTRAVENOUS at 11:08

## 2024-08-08 RX ADMIN — ACETYLCYSTEINE 600 MG: 100 INHALANT RESPIRATORY (INHALATION) at 09:08

## 2024-08-08 RX ADMIN — SODIUM CHLORIDE: 9 INJECTION, SOLUTION INTRAVENOUS at 05:08

## 2024-08-08 RX ADMIN — CLOPIDOGREL BISULFATE 75 MG: 75 TABLET ORAL at 08:08

## 2024-08-08 RX ADMIN — LEVOTHYROXINE SODIUM 50 MCG: 50 TABLET ORAL at 05:08

## 2024-08-08 RX ADMIN — SODIUM CHLORIDE: 9 INJECTION, SOLUTION INTRAVENOUS at 08:08

## 2024-08-08 RX ADMIN — CARVEDILOL 12.5 MG: 12.5 TABLET, FILM COATED ORAL at 08:08

## 2024-08-08 NOTE — PROGRESS NOTES
[facility] ([facility])  Nephrology  Progress Note    Patient Name: Elie Contreras  MRN: [id number]  Admission Date: 8/6/2024  Hospital Length of Stay: 2 days  Attending Provider: Matt Diallo MD   Primary Care Physician: Rigoberto Champion MD  Principal Problem:NSTEMI (non-ST elevated myocardial infarction)    Subjective:     HPI: Thank you for referring the pt to us. H/o and chart were reviewed. Pt was seen and examined. Pt is a 62 y/o male with CKD stage 4, DM-2, HTN, CAD, and diastolic CHF, who presented with NSTEMI. Pt currently feels well, no CP, no SOB. S Cr has slightly worsened from baseline. Pt is a non-smoker. Cardiology is recommending urgent LHC, but renal function remains a concern. Fully discussed with pt the use of Iv contrast and his renal function.    Interval History: Pt was seen and examined. Labs and meds reviewed. Discussed with other providers. No new c/o's, no CP, no SOB. Pt having LHC done today after recent NSTEMI. Received mucomyst and NS IVF's.    Review of patient's allergies indicates:  No Known Allergies  Current Facility-Administered Medications   Medication Frequency    0.9%  NaCl infusion Continuous    0.9%  NaCl infusion Continuous    acetaminophen tablet 650 mg Q4H PRN    acetylcysteine 100 mg/ml (10%) solution 600 mg BID    amLODIPine tablet 2.5 mg Daily    aspirin chewable tablet 81 mg Daily    atropine injection 0.5 mg PRN    carvediloL tablet 12.5 mg BID WM    ceFAZolin injection PRN    clopidogreL tablet 75 mg Daily    dextrose 10% bolus 125 mL 125 mL PRN    dextrose 10% bolus 250 mL 250 mL PRN    fentaNYL 50 mcg/mL injection PRN    glucagon (human recombinant) injection 1 mg PRN    glucose chewable tablet 16 g PRN    glucose chewable tablet 24 g PRN    heparin 25,000 units in dextrose 5% (100 units/ml) IV bolus from bag LOW INTENSITY nomogram - [facility] PRN    heparin 25,000 units in dextrose 5% (100 units/ml) IV bolus from bag LOW INTENSITY nomogram - [facility] PRN     heparin 25,000 units in dextrose 5% 250 mL (100 units/mL) infusion LOW INTENSITY nomogram - [facility] Continuous    HYDROcodone-acetaminophen 5-325 mg per tablet 1 tablet Q4H PRN    insulin aspart U-100 pen 0-5 Units QID (AC + HS) PRN    iohexoL (OMNIPAQUE 300) injection PRN    [START ON 8/9/2024] isosorbide mononitrate 24 hr tablet 30 mg Daily    levothyroxine tablet 50 mcg Before breakfast    LIDOcaine (PF) 20 mg/ml (2%) injection PRN    midazolam (VERSED) 1 mg/mL injection PRN    nitroGLYCERIN SL tablet 0.4 mg Q5 Min PRN    ondansetron disintegrating tablet 8 mg Q8H PRN    oxyCODONE immediate release tablet 10 mg Q4H PRN    sodium bicarbonate tablet 650 mg TID    sodium chloride 0.9% flush 10 mL PRN       Objective:     Vital Signs (Most Recent):  Temp: 98 °F (36.7 °C) (08/08/24 1535)  Pulse: 62 (08/08/24 1535)  Resp: 16 (08/08/24 1535)  BP: (!) 140/77 (08/08/24 1535)  SpO2: 97 % (08/08/24 1535) Vital Signs (24h Range):  Temp:  [98 °F (36.7 °C)-98.7 °F (37.1 °C)] 98 °F (36.7 °C)  Pulse:  [58-70] 62  Resp:  [15-20] 16  SpO2:  [95 %-99 %] 97 %  BP: (114-140)/(71-77) 140/77     Weight: 105.9 kg (233 lb 7.5 oz) (08/07/24 2345)  Body mass index is 29.98 kg/m².  Body surface area is 2.35 meters squared.    I/O last 3 completed shifts:  In: 127.7 [I.V.:127.7]  Out: -      Physical Exam  Vitals and nursing note reviewed.   Constitutional:       Appearance: Normal appearance.   Cardiovascular:      Rate and Rhythm: Normal rate and regular rhythm.      Pulses: Normal pulses.      Heart sounds: Normal heart sounds.   Pulmonary:      Effort: Pulmonary effort is normal.      Breath sounds: Normal breath sounds.   Abdominal:      Palpations: Abdomen is soft.      Tenderness: There is no abdominal tenderness.   Musculoskeletal:      Right lower leg: No edema.      Left lower leg: No edema.   Neurological:      Mental Status: He is alert and oriented to person, place, and time.   Psychiatric:         Behavior: Behavior normal.           Significant Labs: reviewed  BMP  Lab Results   Component Value Date     08/08/2024    K 4.6 08/08/2024     (H) 08/08/2024    CO2 19 (L) 08/08/2024    BUN 49 (H) 08/08/2024    CREATININE 3.3 (H) 08/08/2024    CALCIUM 9.1 08/08/2024    ANIONGAP 10 08/08/2024    EGFRNORACEVR 20 (A) 08/08/2024     Lab Results   Component Value Date    WBC 4.23 08/08/2024    HGB 10.1 (L) 08/08/2024    HCT 30.4 (L) 08/08/2024    MCV 89 08/08/2024     (L) 08/08/2024       Recent Labs   Lab 08/08/24  0235   TROPONINI 3.611*       Significant Imaging: reviewed        Assessment/Plan:     64 y/o male with CKD stage 4 and DM presented post NSTEMI with SANTIAGO:     Acute kidney injury superimposed on chronic kidney disease  S Cr stable, lower, close to prior baseline. Stable renal function  Mild SANTIAGO on advanced CKD stage 4  H/o of DM and HTN  SANTIAGO likely due to pre-renal azotemia from the effect of the MI  Proteinuria  CKD likely due to diabetic nephropathy and HTN     Risk of contrast nephropathy was discussed with pt in details yesterday  This risk is not definite, and with the new generation of dyes will be unlikely to occur  Without LHC, CHF will worsen.  Agree with cardiology  Pt understood the risk and accepted to do the procedure     Recommend:  Continue NS IVF's at 50-75 ml/hr for about 4 hours post contrast exposure  Mucomyst 600 mg po bid x 2 doses before and 2 doses post contrast  Hold diuretics on day of LHC     NSTEMI (non-ST elevated myocardial infarction)  CAD, NSTEMI  Diastolic CHF. May have had silent MI's in the past, which is common in diabetics  Risk for heart disease in this pt is DM and being male, pt is non-smoker    Undergoing LHC today  Will f/u with cardiology recommendations     Type 2 diabetes mellitus  Elevated HgA1C about 7  Not fully controlled  Diet and life style briefly reviewed with pt        Plans and recommendations:  As detailed above  Total time spent 40 minutes including time needed to  review the records, the   patient evaluation, documentation, face-to-face discussion with the patient,   more than 50% of the time was spent on coordination of care and counseling.    Level V visit.        Thank you for your consult.     Shanna Wang MD  Nephrology  [facility] ([facility])

## 2024-08-08 NOTE — PROGRESS NOTES
[facility] - Telemetry ([facility])  Cardiology  Progress Note    Patient Name: Elie Contreras  MRN: [id number]  Admission Date: 8/6/2024  Hospital Length of Stay: 2 days  Code Status: Full Code   Attending Physician: Matt Diallo MD   Primary Care Physician: Rigoberto Champion MD  Expected Discharge Date:   Principal Problem:NSTEMI (non-ST elevated myocardial infarction)    Subjective:     Hospital Course:   8-8-24 [facility] today, Risks and benefits explained     Interval History:     Review of Systems   Constitutional: Negative. Negative for weight gain.   HENT: Negative.     Eyes: Negative.    Cardiovascular: Negative.  Negative for chest pain, leg swelling and palpitations.   Respiratory: Negative.  Negative for shortness of breath.    Endocrine: Negative.    Hematologic/Lymphatic: Negative.    Skin: Negative.    Musculoskeletal:  Negative for muscle weakness.   Gastrointestinal: Negative.    Genitourinary: Negative.    Neurological: Negative.  Negative for dizziness.   Psychiatric/Behavioral: Negative.     Allergic/Immunologic: Negative.    All other systems reviewed and are negative.    Objective:     Vital Signs (Most Recent):  Temp: 98.7 °F (37.1 °C) (08/08/24 1129)  Pulse: 70 (08/08/24 1129)  Resp: 15 (08/08/24 1129)  BP: 114/71 (08/08/24 1129)  SpO2: 97 % (08/08/24 1129) Vital Signs (24h Range):  Temp:  [97.8 °F (36.6 °C)-98.7 °F (37.1 °C)] 98.7 °F (37.1 °C)  Pulse:  [58-70] 70  Resp:  [15-20] 15  SpO2:  [95 %-99 %] 97 %  BP: (114-133)/(71-77) 114/71     Weight: 105.9 kg (233 lb 7.5 oz)  Body mass index is 29.98 kg/m².     SpO2: 97 %         Intake/Output Summary (Last 24 hours) at 8/8/2024 1146  Last data filed at 8/8/2024 0855  Gross per 24 hour   Intake 50 ml   Output --   Net 50 ml       Lines/Drains/Airways       Peripheral Intravenous Line  Duration                  Peripheral IV - Single Lumen 08/06/24 1744 20 G Anterior;Left;Proximal Forearm 1 day         Peripheral IV - Single Lumen 08/07/24 2000 22 G  Posterior;Right Forearm <1 day         Peripheral IV - Single Lumen 08/08/24 0933 18 G Anterior;Left Forearm <1 day                       Physical Exam  Vitals and nursing note reviewed.   Constitutional:       Appearance: He is well-developed.   HENT:      Head: Normocephalic and atraumatic.   Eyes:      Conjunctiva/sclera: Conjunctivae normal.      Pupils: Pupils are equal, round, and reactive to light.   Cardiovascular:      Rate and Rhythm: Normal rate and regular rhythm.      Pulses: Intact distal pulses.      Heart sounds: Normal heart sounds.   Pulmonary:      Effort: Pulmonary effort is normal.      Breath sounds: Normal breath sounds.   Abdominal:      General: Bowel sounds are normal.      Palpations: Abdomen is soft.   Musculoskeletal:      Cervical back: Normal range of motion and neck supple.   Skin:     General: Skin is warm and dry.   Neurological:      Mental Status: He is alert and oriented to person, place, and time.            Significant Labs: All pertinent lab results from the last 24 hours have been reviewed.    Significant Imaging: X-Ray: CXR: X-Ray Chest 1 View (CXR): No results found for this visit on 08/06/24.  Assessment and Plan:     Brief HPI:     * NSTEMI (non-ST elevated myocardial infarction)  63 y.o. male with a PMH has a past medical history of Anemia, unspecified, Diabetes mellitus, Hypertension, Mixed hyperlipidemia, and CKD probably stage 4 ( Cr 4.3)echocardiogram EF 35-40%, NMT/stress 1-24 with fixed defect  Admitted for NSTEMI. Peak CK 6.01. EKG no changes NSR, st-t changes inferolaterally. CP is atypical though with recent exertional sx.No sx currently    Recommend [facility] but high risk for contrast nephropathy, will need renal clearance  Continue IV heparin, asa, b blockers        VTE Risk Mitigation (From admission, onward)           Ordered     Place sequential compression device  Until discontinued         08/06/24 2251     IP VTE LOW RISK PATIENT  Once         08/06/24 2251      heparin 25,000 units in dextrose 5% (100 units/ml) IV bolus from bag LOW INTENSITY nomogram - [facility]  As needed (PRN)        Question:  Heparin Infusion Adjustment (DO NOT MODIFY ANSWER)  Answer:  [email or web address]\epic\Images\Pharmacy\HeparinInfusions\heparin LOW INTENSITY nomogram for [facility] [id number]    08/06/24 1925     heparin 25,000 units in dextrose 5% (100 units/ml) IV bolus from bag LOW INTENSITY nomogram - [facility]  As needed (PRN)        Question:  Heparin Infusion Adjustment (DO NOT MODIFY ANSWER)  Answer:  \\[email or web address]\epic\Images\Pharmacy\HeparinInfusions\heparin LOW INTENSITY nomogram for [facility] [id number]    08/06/24 1925     heparin 25,000 units in dextrose 5% 250 mL (100 units/mL) infusion LOW INTENSITY nomogram - [facility]  Continuous        Question:  Begin at (units/kg/hr)  Answer:  12 08/06/24 1925                    Jhonny Fierro MD  Cardiology  [facility] - Telemetry ([facility])

## 2024-08-08 NOTE — SUBJECTIVE & OBJECTIVE
Interval History:     Review of Systems   Constitutional: Negative. Negative for weight gain.   HENT: Negative.     Eyes: Negative.    Cardiovascular: Negative.  Negative for chest pain, leg swelling and palpitations.   Respiratory: Negative.  Negative for shortness of breath.    Endocrine: Negative.    Hematologic/Lymphatic: Negative.    Skin: Negative.    Musculoskeletal:  Negative for muscle weakness.   Gastrointestinal: Negative.    Genitourinary: Negative.    Neurological: Negative.  Negative for dizziness.   Psychiatric/Behavioral: Negative.     Allergic/Immunologic: Negative.    All other systems reviewed and are negative.    Objective:     Vital Signs (Most Recent):  Temp: 98.7 °F (37.1 °C) (08/08/24 1129)  Pulse: 70 (08/08/24 1129)  Resp: 15 (08/08/24 1129)  BP: 114/71 (08/08/24 1129)  SpO2: 97 % (08/08/24 1129) Vital Signs (24h Range):  Temp:  [97.8 °F (36.6 °C)-98.7 °F (37.1 °C)] 98.7 °F (37.1 °C)  Pulse:  [58-70] 70  Resp:  [15-20] 15  SpO2:  [95 %-99 %] 97 %  BP: (114-133)/(71-77) 114/71     Weight: 105.9 kg (233 lb 7.5 oz)  Body mass index is 29.98 kg/m².     SpO2: 97 %         Intake/Output Summary (Last 24 hours) at 8/8/2024 1146  Last data filed at 8/8/2024 0855  Gross per 24 hour   Intake 50 ml   Output --   Net 50 ml       Lines/Drains/Airways       Peripheral Intravenous Line  Duration                  Peripheral IV - Single Lumen 08/06/24 1744 20 G Anterior;Left;Proximal Forearm 1 day         Peripheral IV - Single Lumen 08/07/24 2000 22 G Posterior;Right Forearm <1 day         Peripheral IV - Single Lumen 08/08/24 0933 18 G Anterior;Left Forearm <1 day                       Physical Exam  Vitals and nursing note reviewed.   Constitutional:       Appearance: He is well-developed.   HENT:      Head: Normocephalic and atraumatic.   Eyes:      Conjunctiva/sclera: Conjunctivae normal.      Pupils: Pupils are equal, round, and reactive to light.   Cardiovascular:      Rate and Rhythm: Normal rate and  regular rhythm.      Pulses: Intact distal pulses.      Heart sounds: Normal heart sounds.   Pulmonary:      Effort: Pulmonary effort is normal.      Breath sounds: Normal breath sounds.   Abdominal:      General: Bowel sounds are normal.      Palpations: Abdomen is soft.   Musculoskeletal:      Cervical back: Normal range of motion and neck supple.   Skin:     General: Skin is warm and dry.   Neurological:      Mental Status: He is alert and oriented to person, place, and time.            Significant Labs: All pertinent lab results from the last 24 hours have been reviewed.    Significant Imaging: X-Ray: CXR: X-Ray Chest 1 View (CXR): No results found for this visit on 08/06/24.

## 2024-08-08 NOTE — BRIEF OP NOTE
<[facility] Cath Lab ([facility])  Cardiology Department  Operative Note    SUMMARY     Date of Procedure: 8/8/2024     Procedure: Procedure(s) (LRB):  Left heart cath (Left)     Surgeons and Role:     * Jhonny Fierro MD - Primary    Assisting Surgeon: None    Pre-Operative Diagnosis: NSTEMI (non-ST elevated myocardial infarction) [I21.4]    Post-Operative Diagnosis: Post-Op Diagnosis Codes:     * NSTEMI (non-ST elevated myocardial infarction) [I21.4]    Anesthesia: RN IV Sedation    Technical Procedures Used: LHC, Dx CHF    Description of the Findings of the Procedure: Findings-distal small vessel disease, EF35-40%, recommend medical treatment    Significant Surgical Tasks Conducted by the Assistant(s), if Applicable: none    Complications: No    Estimated Blood Loss (EBL): < 50 cc           Implants: * No implants in log *    Specimens:   Specimen (24h ago, onward)      None                    Condition: Good    Disposition: PACU - hemodynamically stable.    Attestation: I was present and scrubbed for the entire procedure.

## 2024-08-08 NOTE — PLAN OF CARE
Ongoing (interventions implemented as appropriate)  Pt is alert and oriented.    VSS  Pt able to make needs known.  Pt remained afebrile throughout this shift.   Pt remained free of falls this shift.   Pt denies pain this shift.  Plan of care reviewed. Patient verbalizes understanding.   Pt moving/turing independent. Frequent weight shifting encouraged.  Patient normal sinus rhythm on monitor.   Bed low, side rails up x 2, wheels locked, call light in reach.   Hourly rounding completed.   Will continue to observe.

## 2024-08-08 NOTE — HOSPITAL COURSE
8-8-24 [facility] today, Risks and benefits explained     8-9-24 cath nonobstructive ds, medical tx, can go home on coreg, nitrates, asa, plavix, norvasc, ststin   The patient is a 89y Female complaining of shortness of breath.

## 2024-08-08 NOTE — SUBJECTIVE & OBJECTIVE
Interval History: Pt was seen and examined. Labs and meds reviewed. Discussed with other providers. No new c/o's, no CP, no SOB. Pt having LHC done today after recent NSTEMI. Received mucomyst and NS IVF's.    Review of patient's allergies indicates:  No Known Allergies  Current Facility-Administered Medications   Medication Frequency    0.9%  NaCl infusion Continuous    0.9%  NaCl infusion Continuous    acetaminophen tablet 650 mg Q4H PRN    acetylcysteine 100 mg/ml (10%) solution 600 mg BID    amLODIPine tablet 2.5 mg Daily    aspirin chewable tablet 81 mg Daily    atropine injection 0.5 mg PRN    carvediloL tablet 12.5 mg BID WM    ceFAZolin injection PRN    clopidogreL tablet 75 mg Daily    dextrose 10% bolus 125 mL 125 mL PRN    dextrose 10% bolus 250 mL 250 mL PRN    fentaNYL 50 mcg/mL injection PRN    glucagon (human recombinant) injection 1 mg PRN    glucose chewable tablet 16 g PRN    glucose chewable tablet 24 g PRN    heparin 25,000 units in dextrose 5% (100 units/ml) IV bolus from bag LOW INTENSITY nomogram - [facility] PRN    heparin 25,000 units in dextrose 5% (100 units/ml) IV bolus from bag LOW INTENSITY nomogram - [facility] PRN    heparin 25,000 units in dextrose 5% 250 mL (100 units/mL) infusion LOW INTENSITY nomogram - [facility] Continuous    HYDROcodone-acetaminophen 5-325 mg per tablet 1 tablet Q4H PRN    insulin aspart U-100 pen 0-5 Units QID (AC + HS) PRN    iohexoL (OMNIPAQUE 300) injection PRN    [START ON 8/9/2024] isosorbide mononitrate 24 hr tablet 30 mg Daily    levothyroxine tablet 50 mcg Before breakfast    LIDOcaine (PF) 20 mg/ml (2%) injection PRN    midazolam (VERSED) 1 mg/mL injection PRN    nitroGLYCERIN SL tablet 0.4 mg Q5 Min PRN    ondansetron disintegrating tablet 8 mg Q8H PRN    oxyCODONE immediate release tablet 10 mg Q4H PRN    sodium bicarbonate tablet 650 mg TID    sodium chloride 0.9% flush 10 mL PRN       Objective:     Vital Signs (Most Recent):  Temp: 98 °F (36.7 °C) (08/08/24  1535)  Pulse: 62 (08/08/24 1535)  Resp: 16 (08/08/24 1535)  BP: (!) 140/77 (08/08/24 1535)  SpO2: 97 % (08/08/24 1535) Vital Signs (24h Range):  Temp:  [98 °F (36.7 °C)-98.7 °F (37.1 °C)] 98 °F (36.7 °C)  Pulse:  [58-70] 62  Resp:  [15-20] 16  SpO2:  [95 %-99 %] 97 %  BP: (114-140)/(71-77) 140/77     Weight: 105.9 kg (233 lb 7.5 oz) (08/07/24 2345)  Body mass index is 29.98 kg/m².  Body surface area is 2.35 meters squared.    I/O last 3 completed shifts:  In: 127.7 [I.V.:127.7]  Out: -      Physical Exam  Vitals and nursing note reviewed.   Constitutional:       Appearance: Normal appearance.   Cardiovascular:      Rate and Rhythm: Normal rate and regular rhythm.      Pulses: Normal pulses.      Heart sounds: Normal heart sounds.   Pulmonary:      Effort: Pulmonary effort is normal.      Breath sounds: Normal breath sounds.   Abdominal:      Palpations: Abdomen is soft.      Tenderness: There is no abdominal tenderness.   Musculoskeletal:      Right lower leg: No edema.      Left lower leg: No edema.   Neurological:      Mental Status: He is alert and oriented to person, place, and time.   Psychiatric:         Behavior: Behavior normal.          Significant Labs: reviewed  BMP  Lab Results   Component Value Date     08/08/2024    K 4.6 08/08/2024     (H) 08/08/2024    CO2 19 (L) 08/08/2024    BUN 49 (H) 08/08/2024    CREATININE 3.3 (H) 08/08/2024    CALCIUM 9.1 08/08/2024    ANIONGAP 10 08/08/2024    EGFRNORACEVR 20 (A) 08/08/2024     Lab Results   Component Value Date    WBC 4.23 08/08/2024    HGB 10.1 (L) 08/08/2024    HCT 30.4 (L) 08/08/2024    MCV 89 08/08/2024     (L) 08/08/2024       Recent Labs   Lab 08/08/24  0235   TROPONINI 3.611*       Significant Imaging: reviewed   30y M denies pmHx presents to ED c/o epigastric pain x3 weeks. Endorses epigastric pain, nausea. Denies vomiting, f/c, CP/SOB, dark/tarry stool, urinary sx,  radiating pain, worsening factors. Pt reports relief when drinking hot liquids. Abdomen soft, tender to epigastrium. Pt AAOx4, speaking in full and clear sentences, NAD at this time. Ambulatory with steady gait.

## 2024-08-08 NOTE — ASSESSMENT & PLAN NOTE
62 y/o male with CKD stage 4 and DM presented post NSTEMI with SANTIAGO:     Acute kidney injury superimposed on chronic kidney disease  Pt has mild SANTIAGO on advanced CKD stage 4  H/o of DM and HTN  SANTIAGO likely due to pre-renal azotemia from the effect of the MI  Has proteinuria  CKD likely due to diabetic nephropathy and HTN     Risk of contrast nephropathy was discussed with pt in details  This risk is not definite, and with the new generation of dyes will be unlikely to occur  Without LHC, CHF will worsen.  Agree with cardiology  Pt understands the risk and accepts to do the procedure     Recommend:  Continue NS IVF's at 50-75 ml/hr  Mucomyst 600 mg po bid x 2 doses before and 2 doses post contrast  Hold diuretics on day of LHC     NSTEMI (non-ST elevated myocardial infarction)  Pt has heart disease  CAD, NSTEMI  Diastolic CHF. May have has silent MI's in the past, which is common in diabetics  Risk for heart disease in this pt is DM and being male, pt is non-smoker  IV contrast use recommendations as above     Type 2 diabetes mellitus  Reviewed the chart  Elevated HgA1C about 7  Not fully controlled  Diet and life style briefly reviewed with pt        Plans and recommendations:  As detailed above  Total time spent 70 minutes including time needed to review the records, the   patient evaluation, documentation, face-to-face discussion with the patient,   more than 50% of the time was spent on coordination of care and counseling.    Level V visit.

## 2024-08-08 NOTE — PLAN OF CARE
POC reviewed with pt. Pt verbalizes understanding of POC. No questions at this time.  AAOx3.   NSR with 1st degree heart block on cardiac monitor.  Pt remains free of falls.  Pt ambulatory.  Left Heart cath performed. Hep drip held at 0830 per md orders.  Daily weights. Cardiac diet.  No complaints at this time.  Safety measures in place. Will continue to monitor.  Informed pt to call for assistance before getting up. Pt verbalizes understanding.  Hourly rounding and chart check complete.

## 2024-08-08 NOTE — PLAN OF CARE
Pt transferred to  203 per hosp bed , NADN, , L groin dsg CD, no hematoma noted, bedside handoff to Isrrael, RN groin site assessed/ IV sites assessed, IV fluids to pump per Isrrael RN, to resume at 100ml/hr / diet tray sent with pt/ bed in low position, sr up x 2, call light in reach/     Pt instructed on continued Bedrest time/ keeping right leg straight/ head flat / s/s bleeding and hematoma of right groin to report to RN and upon discharge / s/s infection/ circulatory compromise / pt v/u

## 2024-08-09 VITALS
OXYGEN SATURATION: 98 % | SYSTOLIC BLOOD PRESSURE: 135 MMHG | BODY MASS INDEX: 30.7 KG/M2 | WEIGHT: 239.19 LBS | DIASTOLIC BLOOD PRESSURE: 88 MMHG | TEMPERATURE: 97 F | RESPIRATION RATE: 16 BRPM | HEART RATE: 81 BPM | HEIGHT: 74 IN

## 2024-08-09 PROBLEM — R07.9 CHEST PAIN: Status: RESOLVED | Noted: 2024-08-06 | Resolved: 2024-08-09

## 2024-08-09 PROBLEM — N18.9 ANEMIA DUE TO CHRONIC KIDNEY DISEASE: Status: ACTIVE | Noted: 2024-08-09

## 2024-08-09 PROBLEM — N17.9 ACUTE KIDNEY INJURY SUPERIMPOSED ON CHRONIC KIDNEY DISEASE: Status: RESOLVED | Noted: 2024-08-07 | Resolved: 2024-08-09

## 2024-08-09 PROBLEM — I21.4 NSTEMI (NON-ST ELEVATED MYOCARDIAL INFARCTION): Status: RESOLVED | Noted: 2024-08-06 | Resolved: 2024-08-09

## 2024-08-09 PROBLEM — N18.9 ACUTE KIDNEY INJURY SUPERIMPOSED ON CHRONIC KIDNEY DISEASE: Status: RESOLVED | Noted: 2024-08-07 | Resolved: 2024-08-09

## 2024-08-09 PROBLEM — D63.1 ANEMIA DUE TO CHRONIC KIDNEY DISEASE: Status: ACTIVE | Noted: 2024-08-09

## 2024-08-09 LAB
APTT PPP: 25.9 SEC (ref 21–32)
BASOPHILS # BLD AUTO: 0.02 K/UL (ref 0–0.2)
BASOPHILS NFR BLD: 0.6 % (ref 0–1.9)
CATH EF QUANTITATIVE: 35 %
DIFFERENTIAL METHOD BLD: ABNORMAL
EOSINOPHIL # BLD AUTO: 0.2 K/UL (ref 0–0.5)
EOSINOPHIL NFR BLD: 5.4 % (ref 0–8)
ERYTHROCYTE [DISTWIDTH] IN BLOOD BY AUTOMATED COUNT: 11.9 % (ref 11.5–14.5)
HCT VFR BLD AUTO: 30.6 % (ref 40–54)
HGB BLD-MCNC: 10.3 G/DL (ref 14–18)
IMM GRANULOCYTES # BLD AUTO: 0.01 K/UL (ref 0–0.04)
IMM GRANULOCYTES NFR BLD AUTO: 0.3 % (ref 0–0.5)
LYMPHOCYTES # BLD AUTO: 0.6 K/UL (ref 1–4.8)
LYMPHOCYTES NFR BLD: 17.3 % (ref 18–48)
MCH RBC QN AUTO: 29.9 PG (ref 27–31)
MCHC RBC AUTO-ENTMCNC: 33.7 G/DL (ref 32–36)
MCV RBC AUTO: 89 FL (ref 82–98)
MONOCYTES # BLD AUTO: 0.4 K/UL (ref 0.3–1)
MONOCYTES NFR BLD: 11 % (ref 4–15)
NEUTROPHILS # BLD AUTO: 2.2 K/UL (ref 1.8–7.7)
NEUTROPHILS NFR BLD: 65.4 % (ref 38–73)
NRBC BLD-RTO: 0 /100 WBC
OHS QRS DURATION: 80 MS
OHS QRS DURATION: 82 MS
OHS QTC CALCULATION: 416 MS
OHS QTC CALCULATION: 422 MS
PLATELET # BLD AUTO: 134 K/UL (ref 150–450)
PMV BLD AUTO: 9.4 FL (ref 9.2–12.9)
POCT GLUCOSE: 100 MG/DL (ref 70–110)
POCT GLUCOSE: 177 MG/DL (ref 70–110)
RBC # BLD AUTO: 3.45 M/UL (ref 4.6–6.2)
WBC # BLD AUTO: 3.36 K/UL (ref 3.9–12.7)

## 2024-08-09 PROCEDURE — 99232 SBSQ HOSP IP/OBS MODERATE 35: CPT | Mod: ,,, | Performed by: INTERNAL MEDICINE

## 2024-08-09 PROCEDURE — 25000003 PHARM REV CODE 250: Performed by: HOSPITALIST

## 2024-08-09 PROCEDURE — 25000242 PHARM REV CODE 250 ALT 637 W/ HCPCS: Performed by: INTERNAL MEDICINE

## 2024-08-09 PROCEDURE — 85730 THROMBOPLASTIN TIME PARTIAL: CPT | Performed by: EMERGENCY MEDICINE

## 2024-08-09 PROCEDURE — 25000003 PHARM REV CODE 250: Performed by: INTERNAL MEDICINE

## 2024-08-09 PROCEDURE — 93010 ELECTROCARDIOGRAM REPORT: CPT | Mod: ,,, | Performed by: INTERNAL MEDICINE

## 2024-08-09 PROCEDURE — 85025 COMPLETE CBC W/AUTO DIFF WBC: CPT | Performed by: EMERGENCY MEDICINE

## 2024-08-09 PROCEDURE — 94761 N-INVAS EAR/PLS OXIMETRY MLT: CPT

## 2024-08-09 PROCEDURE — 25000003 PHARM REV CODE 250: Performed by: EMERGENCY MEDICINE

## 2024-08-09 PROCEDURE — 93005 ELECTROCARDIOGRAM TRACING: CPT

## 2024-08-09 RX ORDER — ATORVASTATIN CALCIUM 10 MG/1
20 TABLET, FILM COATED ORAL NIGHTLY
Status: DISCONTINUED | OUTPATIENT
Start: 2024-08-09 | End: 2024-08-09 | Stop reason: HOSPADM

## 2024-08-09 RX ORDER — DAPAGLIFLOZIN 10 MG/1
10 TABLET, FILM COATED ORAL DAILY
Qty: 30 TABLET | Refills: 6 | Status: SHIPPED | OUTPATIENT
Start: 2024-08-09

## 2024-08-09 RX ORDER — ISOSORBIDE MONONITRATE 30 MG/1
30 TABLET, EXTENDED RELEASE ORAL DAILY
Qty: 30 TABLET | Refills: 11 | Status: SHIPPED | OUTPATIENT
Start: 2024-08-10 | End: 2024-08-19

## 2024-08-09 RX ORDER — NITROGLYCERIN 0.4 MG/1
0.4 TABLET SUBLINGUAL EVERY 5 MIN PRN
Qty: 25 TABLET | Refills: 2 | Status: SHIPPED | OUTPATIENT
Start: 2024-08-09 | End: 2025-08-09

## 2024-08-09 RX ORDER — CLOPIDOGREL BISULFATE 75 MG/1
75 TABLET ORAL DAILY
Qty: 30 TABLET | Refills: 11 | Status: SHIPPED | OUTPATIENT
Start: 2024-08-10 | End: 2024-08-19 | Stop reason: SDUPTHER

## 2024-08-09 RX ORDER — DAPAGLIFLOZIN 10 MG/1
10 TABLET, FILM COATED ORAL DAILY
Status: DISCONTINUED | OUTPATIENT
Start: 2024-08-09 | End: 2024-08-09 | Stop reason: HOSPADM

## 2024-08-09 RX ORDER — LEVOTHYROXINE SODIUM 50 UG/1
50 TABLET ORAL
Qty: 30 TABLET | Refills: 11 | Status: SHIPPED | OUTPATIENT
Start: 2024-08-10 | End: 2025-08-10

## 2024-08-09 RX ORDER — CETIRIZINE HYDROCHLORIDE 10 MG/1
10 TABLET ORAL DAILY PRN
Qty: 30 TABLET | Refills: 0 | Status: SHIPPED | OUTPATIENT
Start: 2024-08-09 | End: 2024-09-08

## 2024-08-09 RX ADMIN — ISOSORBIDE MONONITRATE 30 MG: 30 TABLET, EXTENDED RELEASE ORAL at 07:08

## 2024-08-09 RX ADMIN — AMLODIPINE BESYLATE 2.5 MG: 2.5 TABLET ORAL at 07:08

## 2024-08-09 RX ADMIN — CARVEDILOL 12.5 MG: 12.5 TABLET, FILM COATED ORAL at 07:08

## 2024-08-09 RX ADMIN — DAPAGLIFLOZIN 10 MG: 10 TABLET, FILM COATED ORAL at 11:08

## 2024-08-09 RX ADMIN — ACETAMINOPHEN 650 MG: 325 TABLET ORAL at 05:08

## 2024-08-09 RX ADMIN — SODIUM BICARBONATE 650 MG TABLET 650 MG: at 08:08

## 2024-08-09 RX ADMIN — CLOPIDOGREL BISULFATE 75 MG: 75 TABLET ORAL at 07:08

## 2024-08-09 RX ADMIN — ACETYLCYSTEINE 600 MG: 100 INHALANT RESPIRATORY (INHALATION) at 07:08

## 2024-08-09 RX ADMIN — LEVOTHYROXINE SODIUM 50 MCG: 50 TABLET ORAL at 05:08

## 2024-08-09 RX ADMIN — ASPIRIN 81 MG CHEWABLE TABLET 81 MG: 81 TABLET CHEWABLE at 07:08

## 2024-08-09 NOTE — PLAN OF CARE
Ongoing (interventions implemented as appropriate)  Pt is alert and oriented.    VSS  Pt able to make needs known.  Pt remained afebrile throughout this shift.   Pt remained free of falls this shift.   Prn pain medication given.   Plan of care reviewed. Patient verbalizes understanding.   Pt moving/turing independent. Frequent weight shifting encouraged.  Patient sinus daniel with AV block  rhythm on monitor.   Bed low, side rails up x 2, wheels locked, call light in reach.   Hourly rounding completed.   Will continue to observe.

## 2024-08-09 NOTE — PLAN OF CARE
Discharge instructions received and reviewed with pt at bedside.  Pt voiced understanding and all questions answered to satisfaction.  Stressed importance to making and keeping all follow up appointments.  Medications brought to pt by pharmacy and reviewed with pt.  Tele monitor removed and brought to monitor tech.  IV d/c'd with tip intact..  Pt walked down to front lobby.

## 2024-08-09 NOTE — ASSESSMENT & PLAN NOTE
Anemia is likely due to chronic disease due to Chronic Kidney Disease. Most recent hemoglobin and hematocrit are listed below.  Recent Labs     08/07/24  0513 08/08/24  0238 08/09/24  0427   HGB 10.5* 10.1* 10.3*   HCT 32.0* 30.4* 30.6*     Plan  -Monitor serial CBC: Daily  -Transfuse PRBC if patient becomes hemodynamically unstable, symptomatic or H/H drops below 7/21.  -Patient has not received any PRBC transfusions to date  -Patient's anemia is currently stable    H/H expected to fall with IVF- watch     stable

## 2024-08-09 NOTE — ASSESSMENT & PLAN NOTE
Creatine stable for now. BMP reviewed- noted Estimated Creatinine Clearance: 30.4 mL/min (A) (based on SCr of 3.3 mg/dL (H)). according to latest data. Based on current GFR, CKD stage is stage 3 - GFR 30-59.  Monitor UOP and serial BMP and adjust therapy as needed. Renally dose meds. Avoid nephrotoxic medications and procedures.    Cr down from 4.3 to 3.3 now with IVF- proceed with C

## 2024-08-09 NOTE — SUBJECTIVE & OBJECTIVE
Interval History: looks and feels much better, comfortably lying in bed, family surrounding him. Pt seen and and post LHC- non Obst CAD- doing well, getting IVF. Will watch overnight.     Review of Systems   Constitutional:  Positive for activity change.   Respiratory:  Positive for shortness of breath.    Cardiovascular:  Positive for chest pain.   All other systems reviewed and are negative.    Objective:     Vital Signs (Most Recent):  Temp: 98 °F (36.7 °C) (08/08/24 1535)  Pulse: 62 (08/08/24 1535)  Resp: 16 (08/08/24 1535)  BP: (!) 140/77 (08/08/24 1535)  SpO2: 97 % (08/08/24 1535) Vital Signs (24h Range):  Temp:  [98 °F (36.7 °C)-98.7 °F (37.1 °C)] 98 °F (36.7 °C)  Pulse:  [58-70] 62  Resp:  [15-20] 16  SpO2:  [95 %-99 %] 97 %  BP: (114-140)/(71-77) 140/77      Weight: 105.9 kg (233 lb 7.5 oz) (08/07/24 2345)  Body mass index is 29.98 kg/m².  Body surface area is 2.35 meters squared.     I/O last 3 completed shifts:  In: 127.7 [I.V.:127.7]  Out: -       Physical Exam  Vitals reviewed.   Constitutional:       General: He is not in acute distress.     Appearance: Normal appearance. He is obese. He is not ill-appearing, toxic-appearing or diaphoretic.   HENT:      Head: Normocephalic and atraumatic.      Right Ear: External ear normal.      Left Ear: External ear normal.      Nose: Nose normal. No congestion or rhinorrhea.      Mouth/Throat:      Mouth: Mucous membranes are moist.      Pharynx: Oropharynx is clear. No oropharyngeal exudate or posterior oropharyngeal erythema.   Eyes:      General: No scleral icterus.     Extraocular Movements: Extraocular movements intact.      Conjunctiva/sclera: Conjunctivae normal.      Pupils: Pupils are equal, round, and reactive to light.   Neck:      Vascular: No carotid bruit.   Cardiovascular:      Rate and Rhythm: Normal rate and regular rhythm.      Pulses: Normal pulses.      Heart sounds: Normal heart sounds. No murmur heard.     No friction rub. No gallop.    Pulmonary:      Effort: Pulmonary effort is normal. No respiratory distress.      Breath sounds: Normal breath sounds. No stridor. No wheezing, rhonchi or rales.   Chest:      Chest wall: No tenderness.   Abdominal:      General: Abdomen is flat. Bowel sounds are normal. There is no distension.      Palpations: Abdomen is soft. There is no mass.      Tenderness: There is no abdominal tenderness. There is no right CVA tenderness, left CVA tenderness, guarding or rebound.      Hernia: No hernia is present.   Musculoskeletal:         General: No swelling, tenderness, deformity or signs of injury. Normal range of motion.      Cervical back: Normal range of motion and neck supple. No rigidity or tenderness.      Right lower leg: No edema.      Left lower leg: No edema.   Lymphadenopathy:      Cervical: No cervical adenopathy.   Skin:     General: Skin is warm and dry.      Capillary Refill: Capillary refill takes less than 2 seconds.      Coloration: Skin is not jaundiced or pale.      Findings: No bruising, erythema, lesion or rash.   Neurological:      General: No focal deficit present.      Mental Status: He is alert and oriented to person, place, and time. Mental status is at baseline.      Cranial Nerves: No cranial nerve deficit.      Sensory: No sensory deficit.      Motor: No weakness.      Coordination: Coordination normal.      Gait: Gait normal.      Deep Tendon Reflexes: Reflexes normal.   Psychiatric:         Mood and Affect: Mood normal.         Behavior: Behavior normal.         Thought Content: Thought content normal.         Judgment: Judgment normal.             Significant Labs: All pertinent labs within the past 24 hours have been reviewed.  BMP:   Recent Labs   Lab 08/08/24  0235   *      K 4.6   *   CO2 19*   BUN 49*   CREATININE 3.3*   CALCIUM 9.1     Troponin- 3.6- coming down    Significant Imaging: I have reviewed all pertinent imaging results/findings within the past 24  hours.

## 2024-08-09 NOTE — ASSESSMENT & PLAN NOTE
Patient presents with NSTEMI. Chest pain is currently uncontrolled. EMY score is 4. Patient is currently on NSTEMI Pathway.    EKG reviewed. Troponins reviewed and results noted-   Recent Labs   Lab 08/08/24  0235   TROPONINI 3.611*       Lipid panel reviewed and shows-     Lab Results   Component Value Date    LDLCALC 51.6 (L) 08/06/2024     Lab Results   Component Value Date    TRIG 52 08/06/2024     Medical management includes; Dual Anti-Platelet therapy, Anticoagulation, High Intensity Stain, and Nitrate Echo has not been performed but currently pending. Latest ECHO results are as follows- Results for orders placed during the hospital encounter of 12/27/23    Echo    Interpretation Summary    Left Ventricle: The left ventricle is moderately dilated. Mildly increased wall thickness. There is concentric remodeling. Moderate global hypokinesis present. There is moderately reduced systolic function with a visually estimated ejection fraction of 35 - 40%. Biplane (2D) method of discs ejection fraction is 33%.    Right Ventricle: Normal right ventricular cavity size. Wall thickness is normal. Right ventricle wall motion  is normal. Systolic function is normal.    Left Atrium: Left atrium is mildly dilated.    Tricuspid Valve: There is mild regurgitation with a centrally directed jet.    Pulmonary Artery: The estimated pulmonary artery systolic pressure is 25 mmHg.    IVC/SVC: Normal venous pressure at 3 mmHg.    Consult for cardiac rehab is ordered. Patient counseled on lifestyle modifications- begin progressive daily aerobic exercise program, follow a low fat, low cholesterol diet, attempt to lose weight, reduce salt in diet and cooking, reduce exposure to stress, improve dietary compliance, use calcium 1 gram daily with Vit D, continue current medications, continue current healthy lifestyle patterns, and return for routine annual checkups. Cardiology is consulted. Plan of care pending with cardiology team. Continue  to monitor patient closely and adjust therapy as needed.    Atypical CP associated with Positive and rising Troponin- treat as NSTEMI- cont Heparin gtt  Cards following and rec LHC but Cr very high- Renal consulted    Troponin down to 3.6- Cr down to 3.3  LHC performed- non Obst CAD  Cont IVF post op

## 2024-08-09 NOTE — PROGRESS NOTES
"[facility]  Progress Note    Patient Name: Elie Contreras  MRN: [id number]  Patient Class: IP- Inpatient   Admission Date: 8/6/2024  Length of Stay: 3 days  Attending Physician: Matt Diallo MD  Primary Care Provider: Rigoberto Champion MD        Subjective:     Principal Problem:NSTEMI (non-ST elevated myocardial infarction)        HPI:  Elie Contreras is a 63 y.o. male with a PMH  has a past medical history of Anemia, unspecified, Diabetes mellitus, Hypertension, Mixed hyperlipidemia, and Renal disorder. who presented as a transfer from [facility] for higher level of care after patient initially presented with acute onset and persistent substernal chest pain over the past few days.  Patient presented to outside facility complaining of substernal chest pain which was described as "feeling you get when you drink something really cold", intermittent in frequency, episodes varying in duration, nonradiating, with no known alleviating or aggravating factors noted.  Pain was rated 8/10 in severity at its worst and currently rated 3/10 at time of bedside assessment.  He denied prior history of MIs, heart failure, arrhythmias, acid reflex, or experiencing similar symptoms in the past.  Prior to onset of symptoms, patient reported being in his usual state of health with no other concerns or complaints.  All other review of systems negative except as noted above.  Initial workup in the ED revealed patient to be afebrile without leukocytosis, hemodynamically stable, SANTIAGO on CKD with creatinine/GFR measuring 4.3/14.8, , troponin 0.979, EKG positive for T-wave inversion noted in the inferior lateral leads, and chest x-ray negative for acute intrathoracic processes.  Cardiology consulted by ED staff and recommended patient be transferred to [place] for continued cardiac observation and patient will be evaluated in the morning.  Patient initiated on NSTEMI pathway and " "admitted to Hospital Medicine inpatient for continued medical management.    PCP: Rigoberto Champion      Overview/Hospital Course:  63 y.o. AAM, hx HTN, HLP, DM 2 w CKD 4, who was transferred from [facility] where he initially presented with acute onset and persistent substernal chest pain over the past few days. CP described as "feeling you get when you drink something really cold", intermittent in frequency, episodes varying in duration, nonradiating, with no known alleviating or aggravating factors noted.  Pain was rated 8/10 in severity at its worst and currently rated 3/10 at time of bedside assessment. He denied prior history of MIs, heart failure, arrhythmias, acid reflex, or experiencing similar symptoms in the past. Initial workup- Afebrile without leukocytosis, hemodynamically stable, SANTIAGO on CKD with Cr/GFR measuring 4.3/14.8, , troponin 0.979, EKG positive for T-wave inversion in the inferior lateral leads, and chest x-ray negative for acute intrathoracic processes. Cardiology consulted by ED staff and recommended patient be transferred to [place] for continued cardiac observation. Patient initiated on NSTEMI pathway- Heparin gtt and admitted to Hospital Medicine inpatient for continued medical management.    8/7- resting comfortably in bed, no CP or SOB, on Heparin gtt, no bleeding issues. Sitting and ambulating in the room easily. Troponin peaked from 0.89> 2.9> 5.16 > 6.1. pt has NSTEMI and Cards have evaluated the pt and recommended LHC but has Cr was 4.3 on admit, increased from baseline 3.6 last month. Renal consulted. Will add IVF to see if renal function improves.      8/8- looks and feels much better, comfortably lying in bed, family surrounding him. Pt seen and and post LHC- non Obst CAD- doing well, getting IVF. Will watch overnight.     Interval History: looks and feels much better, comfortably lying in bed, family surrounding him. Pt seen and and post LHC- non Obst CAD- doing " well, getting IVF. Will watch overnight.     Review of Systems   Constitutional:  Positive for activity change.   Respiratory:  Positive for shortness of breath.    Cardiovascular:  Positive for chest pain.   All other systems reviewed and are negative.    Objective:     Vital Signs (Most Recent):  Temp: 98 °F (36.7 °C) (08/08/24 1535)  Pulse: 62 (08/08/24 1535)  Resp: 16 (08/08/24 1535)  BP: (!) 140/77 (08/08/24 1535)  SpO2: 97 % (08/08/24 1535) Vital Signs (24h Range):  Temp:  [98 °F (36.7 °C)-98.7 °F (37.1 °C)] 98 °F (36.7 °C)  Pulse:  [58-70] 62  Resp:  [15-20] 16  SpO2:  [95 %-99 %] 97 %  BP: (114-140)/(71-77) 140/77      Weight: 105.9 kg (233 lb 7.5 oz) (08/07/24 2345)  Body mass index is 29.98 kg/m².  Body surface area is 2.35 meters squared.     I/O last 3 completed shifts:  In: 127.7 [I.V.:127.7]  Out: -       Physical Exam  Vitals reviewed.   Constitutional:       General: He is not in acute distress.     Appearance: Normal appearance. He is obese. He is not ill-appearing, toxic-appearing or diaphoretic.   HENT:      Head: Normocephalic and atraumatic.      Right Ear: External ear normal.      Left Ear: External ear normal.      Nose: Nose normal. No congestion or rhinorrhea.      Mouth/Throat:      Mouth: Mucous membranes are moist.      Pharynx: Oropharynx is clear. No oropharyngeal exudate or posterior oropharyngeal erythema.   Eyes:      General: No scleral icterus.     Extraocular Movements: Extraocular movements intact.      Conjunctiva/sclera: Conjunctivae normal.      Pupils: Pupils are equal, round, and reactive to light.   Neck:      Vascular: No carotid bruit.   Cardiovascular:      Rate and Rhythm: Normal rate and regular rhythm.      Pulses: Normal pulses.      Heart sounds: Normal heart sounds. No murmur heard.     No friction rub. No gallop.   Pulmonary:      Effort: Pulmonary effort is normal. No respiratory distress.      Breath sounds: Normal breath sounds. No stridor. No wheezing,  rhonchi or rales.   Chest:      Chest wall: No tenderness.   Abdominal:      General: Abdomen is flat. Bowel sounds are normal. There is no distension.      Palpations: Abdomen is soft. There is no mass.      Tenderness: There is no abdominal tenderness. There is no right CVA tenderness, left CVA tenderness, guarding or rebound.      Hernia: No hernia is present.   Musculoskeletal:         General: No swelling, tenderness, deformity or signs of injury. Normal range of motion.      Cervical back: Normal range of motion and neck supple. No rigidity or tenderness.      Right lower leg: No edema.      Left lower leg: No edema.   Lymphadenopathy:      Cervical: No cervical adenopathy.   Skin:     General: Skin is warm and dry.      Capillary Refill: Capillary refill takes less than 2 seconds.      Coloration: Skin is not jaundiced or pale.      Findings: No bruising, erythema, lesion or rash.   Neurological:      General: No focal deficit present.      Mental Status: He is alert and oriented to person, place, and time. Mental status is at baseline.      Cranial Nerves: No cranial nerve deficit.      Sensory: No sensory deficit.      Motor: No weakness.      Coordination: Coordination normal.      Gait: Gait normal.      Deep Tendon Reflexes: Reflexes normal.   Psychiatric:         Mood and Affect: Mood normal.         Behavior: Behavior normal.         Thought Content: Thought content normal.         Judgment: Judgment normal.             Significant Labs: All pertinent labs within the past 24 hours have been reviewed.  BMP:   Recent Labs   Lab 08/08/24  0235   *      K 4.6   *   CO2 19*   BUN 49*   CREATININE 3.3*   CALCIUM 9.1     Troponin- 3.6- coming down    Significant Imaging: I have reviewed all pertinent imaging results/findings within the past 24 hours.    Assessment/Plan:      Type 2 diabetes mellitus  Patient's FSGs are uncontrolled due to hyperglycemia on current medication regimen.  Last  A1c reviewed-   Lab Results   Component Value Date    HGBA1C 6.9 (H) 08/06/2024     Most recent fingerstick glucose reviewed-   Recent Labs   Lab 08/08/24  1550 08/08/24  2121 08/09/24  0535 08/09/24  1111   POCTGLUCOSE 99 205* 100 177*       Current correctional scale  Low  Titrate as needed  anti-hyperglycemic dose as follows-   Antihyperglycemics (From admission, onward)      Start     Stop Route Frequency Ordered    08/09/24 1130  dapagliflozin propanediol (Farxiga) tablet 10 mg        Question Answer Comment   Does this patient have a diagnosis of heart failure? Yes    Does this patient have type 1 diabetes or diabetic ketoacidosis? No    Does this patient have symptomatic hypotension? No    Is the patient NPO or pending major surgery in next 3 days or less? No        -- Oral Daily 08/09/24 1016    08/06/24 2356  insulin aspart U-100 pen 0-5 Units         -- SubQ Before meals & nightly PRN 08/06/24 2466        Plan:  -SSI  -A1c  -Accu-checks  -Hold oral hypoglycemics while patient is in the hospital  -Hypoglycemic protocol      BS under control    Anemia  Anemia is likely due to chronic disease due to Chronic Kidney Disease. Most recent hemoglobin and hematocrit are listed below.  Recent Labs     08/07/24  0513 08/08/24  0238 08/09/24  0427   HGB 10.5* 10.1* 10.3*   HCT 32.0* 30.4* 30.6*     Plan  -Monitor serial CBC: Daily  -Transfuse PRBC if patient becomes hemodynamically unstable, symptomatic or H/H drops below 7/21.  -Patient has not received any PRBC transfusions to date  -Patient's anemia is currently stable    H/H expected to fall with IVF- watch     stable    Chronic kidney disease  Creatine stable for now. BMP reviewed- noted Estimated Creatinine Clearance: 30.4 mL/min (A) (based on SCr of 3.3 mg/dL (H)). according to latest data. Based on current GFR, CKD stage is stage 3 - GFR 30-59.  Monitor UOP and serial BMP and adjust therapy as needed. Renally dose meds. Avoid nephrotoxic medications and  procedures.    Cr down from 4.3 to 3.3 now with IVF- proceed with [facility]    Hypertension  Chronic, controlled. Latest blood pressure and vitals reviewed-     Temp:  [97.6 °F (36.4 °C)-97.9 °F (36.6 °C)]   Pulse:  [60-75]   Resp:  [14-21]   BP: (115-139)/(67-78)   SpO2:  [97 %-100 %] .   Home meds for hypertension were reviewed and noted below.   Hypertension Medications               amlodipine (NORVASC) 2.5 MG tablet Take 2.5 mg by mouth once daily.    carvediloL (COREG) 12.5 MG tablet TAKE 1 TABLET BY MOUTH IN THE MORNING AND 1 IN THE EVENING WITH MEALS    losartan (COZAAR) 25 MG tablet Take 25 mg by mouth once daily.     While in the hospital, will manage blood pressure as follows; Continue home antihypertensive regimen    Will utilize p.r.n. blood pressure medication only if patient's blood pressure greater than 160/100 and he develops symptoms such as worsening chest pain or shortness of breath.      HLD (hyperlipidemia)  Patient is chronically on statin.will continue for now. Last Lipid Panel:   Lab Results   Component Value Date    CHOL 98 (L) 08/06/2024    HDL 36 (L) 08/06/2024    LDLCALC 51.6 (L) 08/06/2024    TRIG 52 08/06/2024    CHOLHDL 36.7 08/06/2024   Plan:  -Continue home medication  -low fat/low calorie diet    Cont Lipitor        VTE Risk Mitigation (From admission, onward)           Ordered     Place sequential compression device  Until discontinued         08/06/24 2251     IP VTE LOW RISK PATIENT  Once         08/06/24 2251                    Discharge Planning   KELI: 8/9/2024     Code Status: Full Code   Is the patient medically ready for discharge?:     Reason for patient still in hospital (select all that apply): Patient new problem, Patient trending condition, Laboratory test, Treatment, Imaging, and Consult recommendations  Discharge Plan A: Home   Discharge Delays: None known at this time      Matt Diallo MD  Department of Hospital Medicine   [facility] ([facility])

## 2024-08-09 NOTE — DISCHARGE SUMMARY
"[facility] - Tele[facility] ([facility] Medicine  Discharge Summary      Patient Name: Elie Contreras  MRN: [id number]  [facility]: [id number]  Patient Class: IP- Inpatient  Admission Date: 8/6/2024  Hospital Length of Stay: 3 days  Discharge Date and Time:  08/09/2024 11:27 AM  Attending Physician: Matt Diallo MD   Discharging Provider: Matt Diallo MD  Primary Care Provider: Rigoberto Champion MD    Primary Care Team: Networked reference to record PCT     HPI:   Elie Contreras is a 63 y.o. male with a PMH  has a past medical history of Anemia, unspecified, Diabetes mellitus, Hypertension, Mixed hyperlipidemia, and Renal disorder. who presented as a transfer from [facility] for higher level of care after patient initially presented with acute onset and persistent substernal chest pain over the past few days.  Patient presented to outside facility complaining of substernal chest pain which was described as "feeling you get when you drink something really cold", intermittent in frequency, episodes varying in duration, nonradiating, with no known alleviating or aggravating factors noted.  Pain was rated 8/10 in severity at its worst and currently rated 3/10 at time of bedside assessment.  He denied prior history of MIs, heart failure, arrhythmias, acid reflex, or experiencing similar symptoms in the past.  Prior to onset of symptoms, patient reported being in his usual state of health with no other concerns or complaints.  All other review of systems negative except as noted above.  Initial workup in the ED revealed patient to be afebrile without leukocytosis, hemodynamically stable, SANTIAGO on CKD with creatinine/GFR measuring 4.3/14.8, , troponin 0.979, EKG positive for T-wave inversion noted in the inferior lateral leads, and chest x-ray negative for acute intrathoracic processes.  Cardiology consulted by ED staff and recommended patient be transferred to [place] for continued cardiac observation and " "patient will be evaluated in the morning.  Patient initiated on NSTEMI pathway and admitted to Hospital Medicine inpatient for continued medical management.    PCP: Rigoberto Champion      Procedure(s) (LRB):  Left heart cath (Left)      Hospital Course:   63 y.o. AAM, hx HTN, HLP, DM 2 w CKD 4, who was transferred from [facility] where he initially presented with acute onset and persistent substernal chest pain over the past few days. CP described as "feeling you get when you drink something really cold", intermittent in frequency, episodes varying in duration, nonradiating, with no known alleviating or aggravating factors noted.  Pain was rated 8/10 in severity at its worst and currently rated 3/10 at time of bedside assessment. He denied prior history of MIs, heart failure, arrhythmias, acid reflex, or experiencing similar symptoms in the past. Initial workup- Afebrile without leukocytosis, hemodynamically stable, SANTIAGO on CKD with Cr/GFR measuring 4.3/14.8, , troponin 0.979, EKG positive for T-wave inversion in the inferior lateral leads, and chest x-ray negative for acute intrathoracic processes. Cardiology consulted by ED staff and recommended patient be transferred to [place] for continued cardiac observation. Patient initiated on NSTEMI pathway- Heparin gtt and admitted to Hospital Medicine inpatient for continued medical management.    8/7- resting comfortably in bed, no CP or SOB, on Heparin gtt, no bleeding issues. Sitting and ambulating in the room easily. Troponin peaked from 0.89> 2.9> 5.16 > 6.1. pt has NSTEMI and Cards have evaluated the pt and recommended LHC but has Cr was 4.3 on admit, increased from baseline 3.6 last month. Renal consulted. Will add IVF to see if renal function improves.      8/8- looks and feels much better, comfortably lying in bed, family surrounding him. Pt seen and and post LHC- non Obst CAD- doing well, getting IVF. Will watch overnight.     8/9- Appreciate Dr." Vadim. Pt rested well overnite, no CP or SOB. Tolerated LHC well, IVF continued post cath, no CP or SOB. Dr. Fierro evaluated and cleared him for discharge. LHC showed non Obst CAD but showed EF low- 35-40%. Cardiac meds have been adjusted and we have changed Glipizide to Farxiga 10 po daily. Continue Tradjenta 5 daily. Pt counseled about daily salt and fluid restrictions which he understands and accepts. He was seen and examined and deemed stable for discharge home today.      Goals of Care Treatment Preferences:  Code Status: Full Code         Consults:   Consults (From admission, onward)          Status Ordering Provider     Inpatient consult to Nephrology  Once        Provider:  Shanna Wang MD    Acknowledged GUILHERME FIERRO     Inpatient consult to Registered Dietitian/Nutritionist  Once        Provider:  (Not yet assigned)    Completed RAHAT CALLOWAY     Inpatient consult to Social Work/Case Management  Once        Provider:  (Not yet assigned)    Completed RAHAT CALLOWAY     Inpatient consult to Cardiology  Once        Provider:  Addie Kim MD    Completed LADI SMITH            No new Assessment & Plan notes have been filed under this hospital service since the last note was generated.  Service: Hospital Medicine    Final Active Diagnoses:    Diagnosis Date Noted POA    Anemia [D64.9] 08/07/2024 Yes    Type 2 diabetes mellitus [E11.9] 08/07/2024 Yes    Chronic kidney disease [N18.9] 08/09/2024 Yes    HLD (hyperlipidemia) [E78.5] 08/07/2024 Yes    Hypertension [I10] 08/07/2024 Yes      Problems Resolved During this Admission:    Diagnosis Date Noted Date Resolved POA    PRINCIPAL PROBLEM:  NSTEMI (non-ST elevated myocardial infarction) [I21.4] 08/06/2024 08/09/2024 Yes    Acute kidney injury superimposed on chronic kidney disease [N17.9, N18.9] 08/07/2024 08/09/2024 Yes    Chest pain [R07.9] 08/06/2024 08/09/2024 Yes       Discharged Condition: stable    Disposition: Home or  Self Care    Follow Up:   Follow-up Information       Rigoberto Champion MD. Schedule an appointment as soon as possible for a visit in 3 day(s).    Specialty: Internal Medicine  Why: Hospital follow up, As needed. please check BMP next week  Contact information:  [address]  [facility] [place] [address] [phone]               Maryellen Santiago PA-C Follow up in 2 week(s).    Specialty: Cardiology  Why: Hospital follow up  Contact information:  [address] [place] [address] [address] [place] [address] [phone]               Indu Berg PA. Schedule an appointment as soon as possible for a visit in 2 day(s).    Specialty: Transplant  Why: Hospital follow up  Contact information:  [address]  [facility] [address] [phone]                           Patient Instructions:      Diet diabetic     Diet Cardiac     Activity as tolerated       Significant Diagnostic Studies: Labs: BMP:   Recent Labs   Lab 08/07/24  1852 08/08/24  0235   * 141*    140   K 4.4 4.6    111*   CO2 21* 19*   BUN 49* 49*   CREATININE 3.6* 3.3*   CALCIUM 9.1 9.1   , CMP   Recent Labs   Lab 08/07/24  1852 08/08/24  0235    140   K 4.4 4.6    111*   CO2 21* 19*   * 141*   BUN 49* 49*   CREATININE 3.6* 3.3*   CALCIUM 9.1 9.1   ANIONGAP 11 10   , CBC   Recent Labs   Lab 08/08/24  0238 08/09/24  0427   WBC 4.23 3.36*   HGB 10.1* 10.3*   HCT 30.4* 30.6*   * 134*   , Lipid Panel   Lab Results   Component Value Date    CHOL 98 (L) 08/06/2024    HDL 36 (L) 08/06/2024    LDLCALC 51.6 (L) 08/06/2024    TRIG 52 08/06/2024    CHOLHDL 36.7 08/06/2024   , Troponin   Recent Labs   Lab 08/07/24  0235 08/07/24  0513 08/08/24  0235   TROPONINI 5.159* 6.102* 3.611*   , A1C:   Recent Labs   Lab 02/21/24  1117 08/06/24  2311   HGBA1C 7.2* 6.9*   , and All labs within the past 24 hours have been reviewed  Radiology:   Imaging Results              X-Ray Chest AP Portable (Final  result)  Result time 08/06/24 18:52:02      Final result by Jana Andersen MD (08/06/24 18:52:02)                   Impression:      X no active or adverse finding      Electronically signed by: Jana Andersen  Date:    08/06/2024  Time:    18:52               Narrative:    EXAMINATION:  XR CHEST AP PORTABLE    CLINICAL HISTORY:  chest pain;    TECHNIQUE:  Single frontal portable view of the chest was performed.    COMPARISON:  09/01/2023    FINDINGS:  The no new pulmonary consolidation pleural effusion or convincing pneumothorax                                     Cardiac Graphics: Echocardiogram: Transthoracic echo (TTE) complete (Cupid Only):   Results for orders placed or performed during the hospital encounter of 08/06/24   Echo   Result Value Ref Range    BSA 2.34 m2    LA WIDTH 3.7 cm    RA Width 2.8 cm    A4C EF 41 %    LVOT stroke volume 74.28 cm3    LVIDd 5.15 3.5 - 6.0 cm    LV Systolic Volume 77.43 mL    LV Systolic Volume Index 33.5 mL/m2    LVIDs 4.17 (A) 2.1 - 4.0 cm    LV Diastolic Volume 126.77 mL    LV Diastolic Volume Index 54.88 mL/m2    LV EDV A4C 95.54 mL    Left Ventricular End Systolic Volume by Teichholz Method 77.43 mL    Left Ventricular End Diastolic Volume by Teichholz Method 126.77 mL    IVS 1.53 (A) 0.6 - 1.1 cm    LVOT diameter 2.22 cm    LVOT area 3.9 cm2    FS 19 (A) 28 - 44 %    Left Ventricle Relative Wall Thickness 0.61 cm    Posterior Wall 1.58 (A) 0.6 - 1.1 cm    LV mass 355.84 g    LV Mass Index 154 g/m2    MV Peak E Tai 0.51 m/s    TDI LATERAL 0.07 m/s    TDI SEPTAL 0.05 m/s    E/E' ratio 8.50 m/s    MV Peak A Tai 0.81 m/s    TR Max Tai 1.97 m/s    E/A ratio 0.63     IVRT 102.76 msec    E wave deceleration time 203.43 msec    LV SEPTAL E/E' RATIO 10.20 m/s    LA Volume Index 25.3 mL/m2    LV LATERAL E/E' RATIO 7.29 m/s    LA volume 58.50 cm3    LVOT peak tai 1.02 m/s    Left Ventricular Outflow Tract Mean Velocity 0.73 cm/s    Left Ventricular Outflow Tract Mean  Gradient 2.41 mmHg    RVOT peak VTI 14.7 cm    TAPSE 1.86 cm    LA size 3.67 cm    Left Atrium Minor Axis 4.98 cm    Left Atrium Major Axis 5.16 cm    RA Major Axis 4.29 cm    AV regurgitation pressure 1/2 time [id number] ms    AR Max Severino 3.93 m/s    AV mean gradient 7 mmHg    AV peak gradient 11 mmHg    Ao peak severino 1.64 m/s    Ao VTI 30.30 cm    LVOT peak VTI 19.20 cm    AV valve area 2.45 cm²    AV Velocity Ratio 0.62     AV index (prosthetic) 0.63     RADHA by Velocity Ratio 2.41 cm²    Mr max severino 2.65 m/s    MV stenosis pressure 1/2 time 58.99 ms    MV valve area p 1/2 method 3.73 cm2    Triscuspid Valve Regurgitation Peak Gradient 16 mmHg    PV mean gradient 1 mmHg    RVOT peak severino 0.74 m/s    Ao root annulus 3.55 cm    STJ 3.45 cm    Ascending aorta 3.40 cm    IVC diameter 1.52 cm    Mean e' 0.06 m/s    ZLVIDS -2.15     ZLVIDD -5.67     EF 35 %    TV resting pulmonary artery pressure 19 mmHg    RV TB RVSP 5 mmHg    Est. RA pres 3 mmHg    Narrative      Left Ventricle: The left ventricle is normal in size. Normal wall   thickness. There is concentric hypertrophy. There is moderately reduced   systolic function. Ejection fraction by visual approximation is 35%. Grade   I diastolic dysfunction.    Right Ventricle: Normal right ventricular cavity size. Wall thickness   is normal. Systolic function is normal.    Aortic Valve: There is mild aortic regurgitation with a centrally   directed jet.    Pulmonary Artery: The estimated pulmonary artery systolic pressure is   19 mmHg.    IVC/SVC: Normal venous pressure at 3 mmHg.              <[facility] - Cath Lab (Hospital)  Cardiology Department  Operative Note     SUMMARY      Date of Procedure: 8/8/2024      Procedure: Procedure(s) (LRB):  Left heart cath (Left)      Surgeons and Role:     * Jhonny Fierro MD - Primary     Assisting Surgeon: None     Pre-Operative Diagnosis: NSTEMI (non-ST elevated myocardial infarction) [I21.4]     Post-Operative Diagnosis:  Post-Op Diagnosis Codes:     * NSTEMI (non-ST elevated myocardial infarction) [I21.4]     Anesthesia: RN IV Sedation     Technical Procedures Used: LHC, Linus CHF     Description of the Findings of the Procedure: Findings-distal small vessel disease, EF35-40%, recommend medical treatment               Pending Diagnostic Studies:       None           Medications:  Reconciled Home Medications:      Medication List        START taking these medications      clopidogreL 75 mg tablet  Commonly known as: PLAVIX  Take 1 tablet (75 mg total) by mouth once daily.  Start taking on: August 10, 2024     dapagliflozin propanediol 10 mg tablet  Commonly known as: Farxiga  Take 1 tablet (10 mg total) by mouth once daily.     isosorbide mononitrate 30 MG 24 hr tablet  Commonly known as: IMDUR  Take 1 tablet (30 mg total) by mouth once daily.  Start taking on: August 10, 2024     levothyroxine 50 MCG tablet  Commonly known as: SYNTHROID  Take 1 tablet (50 mcg total) by mouth daily before breakfast.  Start taking on: August 10, 2024     nitroGLYCERIN 0.4 MG SL tablet  Commonly known as: NITROSTAT  Place 1 tablet (0.4 mg total) under the tongue every 5 (five) minutes as needed for Chest pain. May administer up to 3 tablets in a 15-minute period.            CHANGE how you take these medications      cetirizine 10 MG tablet  Commonly known as: ZYRTEC  Take 1 tablet (10 mg total) by mouth daily as needed for Allergies.  What changed:   when to take this  reasons to take this            CONTINUE taking these medications      amLODIPine 2.5 MG tablet  Commonly known as: NORVASC  Take 2.5 mg by mouth once daily.     aspirin 81 MG EC tablet  Commonly known as: ECOTRIN  Take 1 tablet (81 mg total) by mouth once daily.     atorvastatin 40 MG tablet  Commonly known as: LIPITOR  Take 1 tablet (40 mg total) by mouth every evening.     carvediloL 12.5 MG tablet  Commonly known as: COREG  TAKE 1 TABLET BY MOUTH IN THE MORNING AND 1 IN THE EVENING WITH  MEALS     losartan 25 MG tablet  Commonly known as: COZAAR  Take 25 mg by mouth once daily.     metFORMIN 1000 MG tablet  Commonly known as: GLUCOPHAGE  Take 1,000 mg by mouth 2 (two) times daily with meals.     sodium bicarbonate 650 MG tablet  Take 650 mg by mouth 3 (three) times daily.     TRADJENTA 5 mg Tab tablet  Generic drug: linaGLIPtin  Take 1 tablet by mouth once daily.            STOP taking these medications      glipiZIDE 5 MG Tr24              Indwelling Lines/Drains at time of discharge:   Lines/Drains/Airways       None                   Time spent on the discharge of patient: 45 minutes         Matt Diallo MD  Department of Hospital Medicine  [facility] [place] - Tele[facility] ([facility])

## 2024-08-09 NOTE — ASSESSMENT & PLAN NOTE
Patient's FSGs are uncontrolled due to hyperglycemia on current medication regimen.  Last A1c reviewed-   Lab Results   Component Value Date    HGBA1C 6.9 (H) 08/06/2024     Most recent fingerstick glucose reviewed-   Recent Labs   Lab 08/08/24  1550 08/08/24  2121 08/09/24  0535 08/09/24  1111   POCTGLUCOSE 99 205* 100 177*       Current correctional scale  Low  Titrate as needed  anti-hyperglycemic dose as follows-   Antihyperglycemics (From admission, onward)    Start     Stop Route Frequency Ordered    08/09/24 1130  dapagliflozin propanediol (Farxiga) tablet 10 mg        Question Answer Comment   Does this patient have a diagnosis of heart failure? Yes    Does this patient have type 1 diabetes or diabetic ketoacidosis? No    Does this patient have symptomatic hypotension? No    Is the patient NPO or pending major surgery in next 3 days or less? No        -- Oral Daily 08/09/24 1016    08/06/24 2356  insulin aspart U-100 pen 0-5 Units         -- SubQ Before meals & nightly PRN 08/06/24 1016      Plan:  -SSI  -A1c  -Accu-checks  -Hold oral hypoglycemics while patient is in the hospital  -Hypoglycemic protocol      BS under control   [id number] Comprehensive

## 2024-08-09 NOTE — PLAN OF CARE
[facility] - Telemetry (Hospital)  Discharge Final Note    Primary Care Provider: Rigoberto Champion MD    Expected Discharge Date: 8/9/2024    Final Discharge Note (most recent)       Final Note - 08/09/24 1045          Final Note    Assessment Type Final Discharge Note (P)      Anticipated Discharge Disposition Home or Self Care (P)         Post-Acute Status    Discharge Delays None known at this time (P)                      Important Message from Medicare

## 2024-08-09 NOTE — SUBJECTIVE & OBJECTIVE
Interval History:     Review of Systems   Constitutional: Negative. Negative for weight gain.   HENT: Negative.     Eyes: Negative.    Cardiovascular: Negative.  Negative for chest pain, leg swelling and palpitations.   Respiratory: Negative.  Negative for shortness of breath.    Endocrine: Negative.    Hematologic/Lymphatic: Negative.    Skin: Negative.    Musculoskeletal:  Negative for muscle weakness.   Gastrointestinal: Negative.    Genitourinary: Negative.    Neurological: Negative.  Negative for dizziness.   Psychiatric/Behavioral: Negative.     Allergic/Immunologic: Negative.    All other systems reviewed and are negative.    Objective:     Vital Signs (Most Recent):  Temp: 96.7 °F (35.9 °C) (08/09/24 0728)  Pulse: 81 (08/09/24 0911)  Resp: 16 (08/09/24 0728)  BP: 135/88 (08/09/24 0728)  SpO2: 98 % (08/09/24 0728) Vital Signs (24h Range):  Temp:  [96.7 °F (35.9 °C)-98.7 °F (37.1 °C)] 96.7 °F (35.9 °C)  Pulse:  [57-81] 81  Resp:  [12-21] 16  SpO2:  [95 %-100 %] 98 %  BP: (114-158)/(63-88) 135/88     Weight: 108.5 kg (239 lb 3.2 oz)  Body mass index is 30.71 kg/m².     SpO2: 98 %       No intake or output data in the 24 hours ending 08/09/24 0933    Lines/Drains/Airways       Peripheral Intravenous Line  Duration                  Peripheral IV - Single Lumen 08/07/24 2000 22 G Posterior;Right Forearm 1 day         Peripheral IV - Single Lumen 08/08/24 0933 18 G Anterior;Left Forearm 1 day                       Physical Exam  Vitals and nursing note reviewed.   Constitutional:       Appearance: He is well-developed.   HENT:      Head: Normocephalic and atraumatic.   Eyes:      Conjunctiva/sclera: Conjunctivae normal.      Pupils: Pupils are equal, round, and reactive to light.   Cardiovascular:      Rate and Rhythm: Normal rate and regular rhythm.      Pulses: Intact distal pulses.      Heart sounds: Normal heart sounds.   Pulmonary:      Effort: Pulmonary effort is normal.      Breath sounds: Normal breath  sounds.   Abdominal:      General: Bowel sounds are normal.      Palpations: Abdomen is soft.   Musculoskeletal:      Cervical back: Normal range of motion and neck supple.   Skin:     General: Skin is warm and dry.   Neurological:      Mental Status: He is alert and oriented to person, place, and time.            Significant Labs: All pertinent lab results from the last 24 hours have been reviewed.    Significant Imaging: X-Ray: CXR: X-Ray Chest 1 View (CXR): No results found for this visit on 08/06/24.

## 2024-08-09 NOTE — PROGRESS NOTES
[facility] - Telemetry ([facility])  Cardiology  Progress Note    Patient Name: Elie Contreras  MRN: [id number]  Admission Date: 8/6/2024  Hospital Length of Stay: 3 days  Code Status: Full Code   Attending Physician: Matt Diallo MD   Primary Care Physician: Rigoberto Champion MD  Expected Discharge Date:   Principal Problem:NSTEMI (non-ST elevated myocardial infarction)    Subjective:     Hospital Course:   8-8-24 [facility] today, Risks and benefits explained     8-9-24 cath nonobstructive ds, medical tx, can go home on coreg, nitrates, asa, plavix, norvasc, ststin    Interval History:     Review of Systems   Constitutional: Negative. Negative for weight gain.   HENT: Negative.     Eyes: Negative.    Cardiovascular: Negative.  Negative for chest pain, leg swelling and palpitations.   Respiratory: Negative.  Negative for shortness of breath.    Endocrine: Negative.    Hematologic/Lymphatic: Negative.    Skin: Negative.    Musculoskeletal:  Negative for muscle weakness.   Gastrointestinal: Negative.    Genitourinary: Negative.    Neurological: Negative.  Negative for dizziness.   Psychiatric/Behavioral: Negative.     Allergic/Immunologic: Negative.    All other systems reviewed and are negative.    Objective:     Vital Signs (Most Recent):  Temp: 96.7 °F (35.9 °C) (08/09/24 0728)  Pulse: 81 (08/09/24 0911)  Resp: 16 (08/09/24 0728)  BP: 135/88 (08/09/24 0728)  SpO2: 98 % (08/09/24 0728) Vital Signs (24h Range):  Temp:  [96.7 °F (35.9 °C)-98.7 °F (37.1 °C)] 96.7 °F (35.9 °C)  Pulse:  [57-81] 81  Resp:  [12-21] 16  SpO2:  [95 %-100 %] 98 %  BP: (114-158)/(63-88) 135/88     Weight: 108.5 kg (239 lb 3.2 oz)  Body mass index is 30.71 kg/m².     SpO2: 98 %       No intake or output data in the 24 hours ending 08/09/24 0933    Lines/Drains/Airways       Peripheral Intravenous Line  Duration                  Peripheral IV - Single Lumen 08/07/24 2000 22 G Posterior;Right Forearm 1 day         Peripheral IV - Single Lumen 08/08/24  0933 18 G Anterior;Left Forearm 1 day                       Physical Exam  Vitals and nursing note reviewed.   Constitutional:       Appearance: He is well-developed.   HENT:      Head: Normocephalic and atraumatic.   Eyes:      Conjunctiva/sclera: Conjunctivae normal.      Pupils: Pupils are equal, round, and reactive to light.   Cardiovascular:      Rate and Rhythm: Normal rate and regular rhythm.      Pulses: Intact distal pulses.      Heart sounds: Normal heart sounds.   Pulmonary:      Effort: Pulmonary effort is normal.      Breath sounds: Normal breath sounds.   Abdominal:      General: Bowel sounds are normal.      Palpations: Abdomen is soft.   Musculoskeletal:      Cervical back: Normal range of motion and neck supple.   Skin:     General: Skin is warm and dry.   Neurological:      Mental Status: He is alert and oriented to person, place, and time.            Significant Labs: All pertinent lab results from the last 24 hours have been reviewed.    Significant Imaging: X-Ray: CXR: X-Ray Chest 1 View (CXR): No results found for this visit on 08/06/24.  Assessment and Plan:     Brief HPI:     * NSTEMI (non-ST elevated myocardial infarction)  63 y.o. male with a PMH has a past medical history of Anemia, unspecified, Diabetes mellitus, Hypertension, Mixed hyperlipidemia, and CKD probably stage 4 ( Cr 4.3)echocardiogram EF 35-40%, NMT/stress 1-24 with fixed defect  Admitted for NSTEMI. Peak CK 6.01. EKG no changes NSR, st-t changes inferolaterally. CP is atypical though with recent exertional sx.No sx currently    Recommend LHC but high risk for contrast nephropathy, will need renal clearance  Continue IV heparin, asa, b blockers        VTE Risk Mitigation (From admission, onward)           Ordered     Place sequential compression device  Until discontinued         08/06/24 2251     IP VTE LOW RISK PATIENT  Once         08/06/24 2251                    Jhonny Fierro MD  Cardiology  [facility] - Telemetry  ([facility])

## 2024-08-12 ENCOUNTER — LAB VISIT (OUTPATIENT)
Dept: LAB | Facility: HOSPITAL | Age: 64
End: 2024-08-12
Attending: INTERNAL MEDICINE
Payer: MEDICARE

## 2024-08-12 DIAGNOSIS — E11.29 TYPE II DIABETES MELLITUS WITH RENAL MANIFESTATIONS: ICD-10-CM

## 2024-08-12 DIAGNOSIS — N18.4 CHRONIC KIDNEY DISEASE, STAGE IV (SEVERE): ICD-10-CM

## 2024-08-12 DIAGNOSIS — N18.4 CHRONIC KIDNEY DISEASE, STAGE IV (SEVERE): Primary | ICD-10-CM

## 2024-08-12 LAB
ALBUMIN SERPL BCP-MCNC: 3.6 G/DL (ref 3.5–5.2)
ANION GAP SERPL CALC-SCNC: 10 MMOL/L (ref 8–16)
BUN SERPL-MCNC: 40 MG/DL (ref 8–23)
CALCIUM SERPL-MCNC: 9.7 MG/DL (ref 8.7–10.5)
CHLORIDE SERPL-SCNC: 104 MMOL/L (ref 95–110)
CO2 SERPL-SCNC: 23 MMOL/L (ref 23–29)
CREAT SERPL-MCNC: 3.9 MG/DL (ref 0.5–1.4)
CREAT UR-MCNC: 181.4 MG/DL (ref 23–375)
ERYTHROCYTE [DISTWIDTH] IN BLOOD BY AUTOMATED COUNT: 12 % (ref 11.5–14.5)
EST. GFR  (NO RACE VARIABLE): 16.6 ML/MIN/1.73 M^2
GLUCOSE SERPL-MCNC: 216 MG/DL (ref 70–110)
HCT VFR BLD AUTO: 31.6 % (ref 40–54)
HGB BLD-MCNC: 10.6 G/DL (ref 14–18)
MCH RBC QN AUTO: 29.4 PG (ref 27–31)
MCHC RBC AUTO-ENTMCNC: 33.5 G/DL (ref 32–36)
MCV RBC AUTO: 88 FL (ref 82–98)
PHOSPHATE SERPL-MCNC: 4 MG/DL (ref 2.7–4.5)
PLATELET # BLD AUTO: 153 K/UL (ref 150–450)
PMV BLD AUTO: 9.1 FL (ref 9.2–12.9)
POTASSIUM SERPL-SCNC: 4.6 MMOL/L (ref 3.5–5.1)
PROT UR-MCNC: 71 MG/DL (ref 0–15)
PROT/CREAT UR: 0.39 MG/G{CREAT} (ref 0–0.2)
RBC # BLD AUTO: 3.6 M/UL (ref 4.6–6.2)
SODIUM SERPL-SCNC: 137 MMOL/L (ref 136–145)
WBC # BLD AUTO: 3.46 K/UL (ref 3.9–12.7)

## 2024-08-12 PROCEDURE — 85027 COMPLETE CBC AUTOMATED: CPT | Mod: PO | Performed by: INTERNAL MEDICINE

## 2024-08-12 PROCEDURE — 36415 COLL VENOUS BLD VENIPUNCTURE: CPT | Mod: PO | Performed by: INTERNAL MEDICINE

## 2024-08-12 PROCEDURE — 82570 ASSAY OF URINE CREATININE: CPT | Mod: PO | Performed by: INTERNAL MEDICINE

## 2024-08-12 PROCEDURE — 80069 RENAL FUNCTION PANEL: CPT | Mod: PO | Performed by: INTERNAL MEDICINE

## 2024-08-19 ENCOUNTER — OFFICE VISIT (OUTPATIENT)
Dept: CARDIOLOGY | Facility: CLINIC | Age: 64
End: 2024-08-19
Payer: MEDICARE

## 2024-08-19 VITALS
OXYGEN SATURATION: 99 % | WEIGHT: 230.94 LBS | HEART RATE: 68 BPM | SYSTOLIC BLOOD PRESSURE: 101 MMHG | BODY MASS INDEX: 29.65 KG/M2 | DIASTOLIC BLOOD PRESSURE: 60 MMHG

## 2024-08-19 DIAGNOSIS — E78.00 PURE HYPERCHOLESTEROLEMIA: Primary | ICD-10-CM

## 2024-08-19 DIAGNOSIS — N18.30 STAGE 3 CHRONIC KIDNEY DISEASE, UNSPECIFIED WHETHER STAGE 3A OR 3B CKD: ICD-10-CM

## 2024-08-19 DIAGNOSIS — I10 PRIMARY HYPERTENSION: ICD-10-CM

## 2024-08-19 PROCEDURE — 1111F DSCHRG MED/CURRENT MED MERGE: CPT | Mod: CPTII,S$GLB,, | Performed by: INTERNAL MEDICINE

## 2024-08-19 PROCEDURE — 99214 OFFICE O/P EST MOD 30 MIN: CPT | Mod: S$GLB,,, | Performed by: INTERNAL MEDICINE

## 2024-08-19 PROCEDURE — 3044F HG A1C LEVEL LT 7.0%: CPT | Mod: CPTII,S$GLB,, | Performed by: INTERNAL MEDICINE

## 2024-08-19 PROCEDURE — 1160F RVW MEDS BY RX/DR IN RCRD: CPT | Mod: CPTII,S$GLB,, | Performed by: INTERNAL MEDICINE

## 2024-08-19 PROCEDURE — 3074F SYST BP LT 130 MM HG: CPT | Mod: CPTII,S$GLB,, | Performed by: INTERNAL MEDICINE

## 2024-08-19 PROCEDURE — 99999 PR PBB SHADOW E&M-EST. PATIENT-LVL III: CPT | Mod: PBBFAC,,, | Performed by: INTERNAL MEDICINE

## 2024-08-19 PROCEDURE — 3008F BODY MASS INDEX DOCD: CPT | Mod: CPTII,S$GLB,, | Performed by: INTERNAL MEDICINE

## 2024-08-19 PROCEDURE — 1159F MED LIST DOCD IN RCRD: CPT | Mod: CPTII,S$GLB,, | Performed by: INTERNAL MEDICINE

## 2024-08-19 PROCEDURE — 3078F DIAST BP <80 MM HG: CPT | Mod: CPTII,S$GLB,, | Performed by: INTERNAL MEDICINE

## 2024-08-19 PROCEDURE — 4010F ACE/ARB THERAPY RXD/TAKEN: CPT | Mod: CPTII,S$GLB,, | Performed by: INTERNAL MEDICINE

## 2024-08-19 RX ORDER — CLOPIDOGREL BISULFATE 75 MG/1
75 TABLET ORAL DAILY
Qty: 30 TABLET | Refills: 11 | Status: SHIPPED | OUTPATIENT
Start: 2024-08-19 | End: 2025-08-19

## 2024-08-19 RX ORDER — ISOSORBIDE MONONITRATE 30 MG/1
30 TABLET, EXTENDED RELEASE ORAL DAILY
Start: 2024-08-19 | End: 2025-08-19

## 2024-08-19 NOTE — PROGRESS NOTES
Subjective:   Patient ID:  Elie Contreras is a 63 y.o. male who presents for follow-up of Chest Pain  Cath after nstemi with nonobstructive CAD, NML EF, LVEDP 12  Pt with atypical CP at rest, no exertional sx.    Coronary Artery Disease  Presents for follow-up visit. Symptoms include chest pain and shortness of breath. Pertinent negatives include no dizziness, leg swelling, muscle weakness, palpitations or weight gain. The symptoms have been improving. Compliance with diet is good. Compliance with exercise is good. Compliance with medications is good.       Review of Systems   Constitutional: Negative. Negative for weight gain.   HENT: Negative.     Eyes: Negative.    Cardiovascular:  Positive for chest pain. Negative for leg swelling and palpitations.   Respiratory:  Positive for shortness of breath.    Endocrine: Negative.    Hematologic/Lymphatic: Negative.    Skin: Negative.    Musculoskeletal:  Negative for muscle weakness.   Gastrointestinal: Negative.    Genitourinary: Negative.    Neurological: Negative.  Negative for dizziness.   Psychiatric/Behavioral: Negative.     Allergic/Immunologic: Negative.    All other systems reviewed and are negative.    No family history on file.  Past Medical History:   Diagnosis Date    Anemia, unspecified     Diabetes mellitus     Heart attack     Hypertension     Mixed hyperlipidemia     Renal disorder      Social History     Socioeconomic History    Marital status:    Tobacco Use    Smoking status: Former    Smokeless tobacco: Never   Substance and Sexual Activity    Alcohol use: Yes     Comment: occas    Drug use: No    Sexual activity: Yes     Social Determinants of Health     Financial Resource Strain: Medium Risk (2/29/2024)    [facility] and Affiliates, [facility] and Its Subsidiaries and Affiliates    Overall Financial Resource Strain (CARDIA)      Difficulty of Paying Living Expenses: Somewhat hard   Food Insecurity: No Food Insecurity (2/29/2024)    [facility], [facility] and Affiliates    Hunger Vital Sign     Worried About Running Out of Food in the Last Year: Never true     Ran Out of Food in the Last Year: Never true   Transportation Needs: No Transportation Needs (2/29/2024)    [facility], [facility] and Affiliates    PRAPARE - Transportation     Lack of Transportation (Medical): No     Lack of Transportation (Non-Medical): No   Physical Activity: Sufficiently Active (2/29/2024)    [facility], [facility] and Affiliates    Exercise Vital Sign     Days of Exercise per Week: 5 days     Minutes of Exercise per Session: 40 min   Stress: No Stress Concern Present (2/29/2024)    [facility], [facility] and Aff[facility]ates    [employer] - Occupational Stress Questionnaire     Feeling of Stress : Not at all   Housing Stability: Low Risk  (2/29/2024)    [facility], [facility] and Affiliates    Housing Stability Vital Sign     Unable to Pay for Housing in the Last Year: No     Number of Places Lived in the Last Year: 1     Unstable Housing in the Last Year: No     Current Outpatient Medications on File Prior  to Visit   Medication Sig Dispense Refill    amlodipine (NORVASC) 2.5 MG tablet Take 2.5 mg by mouth once daily.      aspirin (ECOTRIN) 81 MG EC tablet Take 1 tablet (81 mg total) by mouth once daily. 360 tablet 0    atorvastatin (LIPITOR) 40 MG tablet Take 1 tablet (40 mg total) by mouth every evening. 90 tablet 3    carvediloL (COREG) 12.5 MG tablet TAKE 1 TABLET BY MOUTH IN THE MORNING AND 1 IN THE EVENING WITH MEALS      cetirizine (ZYRTEC) 10 MG tablet Take 1 tablet (10 mg total) by mouth daily as needed for Allergies. 30 tablet 0    clopidogreL (PLAVIX) 75 mg tablet Take 1 tablet (75 mg total) by mouth once daily. 30 tablet 11    dapagliflozin propanediol (FARXIGA) 10 mg tablet Take 1 tablet (10 mg total) by mouth once daily. 30 tablet 6    isosorbide mononitrate (IMDUR) 30 MG 24 hr tablet Take 1 tablet (30 mg total) by mouth once daily. 30 tablet 11    levothyroxine (SYNTHROID) 50 MCG tablet Take 1 tablet (50 mcg total) by mouth daily before breakfast. 30 tablet 11    losartan (COZAAR) 25 MG tablet Take 25 mg by mouth once daily.      nitroGLYCERIN (NITROSTAT) 0.4 MG SL tablet Place 1 tablet (0.4 mg total) under the tongue every 5 (five) minutes as needed for Chest pain. May administer up to 3 tablets in a 15-minute period. 25 tablet 2    sodium bicarbonate 650 MG tablet Take 650 mg by mouth 3 (three) times daily.      linaGLIPtin (TRADJENTA) 5 mg Tab tablet Take 1 tablet by mouth once daily.      metformin (GLUCOPHAGE) 1000 MG tablet Take 1,000 mg by mouth 2 (two) times daily with meals. (Patient not taking: Reported on 8/19/2024)       No current facility-administered medications on file prior to visit.     Review of patient's allergies indicates:  No Known Allergies    Objective:     Physical Exam  Vitals and nursing note reviewed.   Constitutional:       Appearance: He is well-developed.   HENT:      Head: Normocephalic and atraumatic.   Eyes:      Conjunctiva/sclera: Conjunctivae normal.      Pupils:  Pupils are equal, round, and reactive to light.   Cardiovascular:      Rate and Rhythm: Normal rate and regular rhythm.      Pulses: Intact distal pulses.      Heart sounds: Normal heart sounds.   Pulmonary:      Effort: Pulmonary effort is normal.      Breath sounds: Normal breath sounds.   Abdominal:      General: Bowel sounds are normal.      Palpations: Abdomen is soft.   Musculoskeletal:      Cervical back: Normal range of motion and neck supple.   Skin:     General: Skin is warm and dry.   Neurological:      Mental Status: He is alert and oriented to person, place, and time.         Assessment:     1. Pure hypercholesterolemia    2. Primary hypertension    3. Stage 3 chronic kidney disease, unspecified whether stage 3a or 3b CKD        Plan:     Pure hypercholesterolemia    Primary hypertension    Stage 3 chronic kidney disease, unspecified whether stage 3a or 3b CKD    exercise    Continue norvasc, coreg, losartan-HTN  Continue statin-HLP  Continue asa, imdur - CAD

## 2024-08-26 ENCOUNTER — TELEPHONE (OUTPATIENT)
Dept: CARDIOLOGY | Facility: CLINIC | Age: 64
End: 2024-08-26
Payer: MEDICARE

## 2024-08-28 ENCOUNTER — TELEPHONE (OUTPATIENT)
Dept: CARDIOLOGY | Facility: CLINIC | Age: 64
End: 2024-08-28
Payer: MEDICARE

## 2024-08-28 NOTE — TELEPHONE ENCOUNTER
Dr. Meneses  is requesting clearance for patient  Procedure: Surgical extraction tooth #15  Date of procedure: TBD  Please include instructions on length of time patient can hold Plavix prior to the procedure.

## 2024-10-14 NOTE — TELEPHONE ENCOUNTER
----- Message from Bibiana sent at 10/14/2024 11:44 AM CDT -----  Contact: FREIDA CHADWICK [[id number]]  .Type:  RX Refill Request    Who Called: FREIDA CHADWICK [[id number]]  Refill or New Rx: refill  RX Name and Strength: isosorbide mononitrate (IMDUR) 30 MG 24 hr tablet  How is the patient currently taking it? (ex. 1XDay): as prescribed  Is this a 30 day or 90 day RX: 30  Preferred Pharmacy with phone number: .  [facility] - [place] - [address]  [address] [address] [address]  Phone: [phone] Fax: [phone]  Local or Mail Order: local  Ordering Provider: Dr Fierro  Would the patient rather a call back or a response via [facility]?  call  Best Call Back Number: .[phone] (home)   Additional Information:  pt completely out of meds

## 2024-10-15 RX ORDER — ISOSORBIDE MONONITRATE 30 MG/1
30 TABLET, EXTENDED RELEASE ORAL DAILY
Qty: 90 TABLET | Refills: 3 | Status: SHIPPED | OUTPATIENT
Start: 2024-10-15 | End: 2025-10-15

## 2024-10-18 ENCOUNTER — TELEPHONE (OUTPATIENT)
Dept: CARDIOLOGY | Facility: CLINIC | Age: 64
End: 2024-10-18
Payer: MEDICARE

## 2024-11-04 ENCOUNTER — LAB VISIT (OUTPATIENT)
Dept: LAB | Facility: HOSPITAL | Age: 64
End: 2024-11-04
Attending: NURSE PRACTITIONER
Payer: MEDICARE

## 2024-11-04 DIAGNOSIS — N18.4 CHRONIC KIDNEY DISEASE, STAGE IV (SEVERE): ICD-10-CM

## 2024-11-04 DIAGNOSIS — E55.9 AVITAMINOSIS D: ICD-10-CM

## 2024-11-04 DIAGNOSIS — N25.81 SECONDARY HYPERPARATHYROIDISM OF RENAL ORIGIN: ICD-10-CM

## 2024-11-04 LAB
ALBUMIN SERPL BCP-MCNC: 3.5 G/DL (ref 3.5–5.2)
ANION GAP SERPL CALC-SCNC: 8 MMOL/L (ref 8–16)
BUN SERPL-MCNC: 38 MG/DL (ref 8–23)
CALCIUM SERPL-MCNC: 9.2 MG/DL (ref 8.7–10.5)
CHLORIDE SERPL-SCNC: 110 MMOL/L (ref 95–110)
CO2 SERPL-SCNC: 22 MMOL/L (ref 23–29)
CREAT SERPL-MCNC: 3.1 MG/DL (ref 0.5–1.4)
ERYTHROCYTE [DISTWIDTH] IN BLOOD BY AUTOMATED COUNT: 12.7 % (ref 11.5–14.5)
EST. GFR  (NO RACE VARIABLE): 21.8 ML/MIN/1.73 M^2
GLUCOSE SERPL-MCNC: 212 MG/DL (ref 70–110)
HCT VFR BLD AUTO: 31.1 % (ref 40–54)
HGB BLD-MCNC: 10.5 G/DL (ref 14–18)
MCH RBC QN AUTO: 30.2 PG (ref 27–31)
MCHC RBC AUTO-ENTMCNC: 33.8 G/DL (ref 32–36)
MCV RBC AUTO: 89 FL (ref 82–98)
PHOSPHATE SERPL-MCNC: 3.7 MG/DL (ref 2.7–4.5)
PLATELET # BLD AUTO: 132 K/UL (ref 150–450)
PMV BLD AUTO: 8.7 FL (ref 9.2–12.9)
POTASSIUM SERPL-SCNC: 4.6 MMOL/L (ref 3.5–5.1)
PTH-INTACT SERPL-MCNC: 156.9 PG/ML (ref 9–77)
RBC # BLD AUTO: 3.48 M/UL (ref 4.6–6.2)
SODIUM SERPL-SCNC: 140 MMOL/L (ref 136–145)
WBC # BLD AUTO: 3.3 K/UL (ref 3.9–12.7)

## 2024-11-04 PROCEDURE — 82306 VITAMIN D 25 HYDROXY: CPT | Performed by: NURSE PRACTITIONER

## 2024-11-04 PROCEDURE — 83970 ASSAY OF PARATHORMONE: CPT | Performed by: NURSE PRACTITIONER

## 2024-11-04 PROCEDURE — 85027 COMPLETE CBC AUTOMATED: CPT | Mod: PO | Performed by: NURSE PRACTITIONER

## 2024-11-04 PROCEDURE — 80069 RENAL FUNCTION PANEL: CPT | Mod: PO | Performed by: NURSE PRACTITIONER

## 2024-11-05 LAB — 25(OH)D3+25(OH)D2 SERPL-MCNC: 35 NG/ML (ref 30–96)

## 2025-01-16 DIAGNOSIS — Z01.818 PREOP EXAM FOR INTERNAL MEDICINE: Primary | ICD-10-CM

## 2025-01-17 ENCOUNTER — OFFICE VISIT (OUTPATIENT)
Dept: CARDIOLOGY | Facility: CLINIC | Age: 65
End: 2025-01-17
Payer: MEDICARE

## 2025-01-17 ENCOUNTER — HOSPITAL ENCOUNTER (OUTPATIENT)
Dept: CARDIOLOGY | Facility: HOSPITAL | Age: 65
Discharge: HOME | End: 2025-01-17
Attending: INTERNAL MEDICINE
Payer: MEDICARE

## 2025-01-17 ENCOUNTER — TELEPHONE (OUTPATIENT)
Dept: CARDIOLOGY | Facility: CLINIC | Age: 65
End: 2025-01-17
Payer: MEDICARE

## 2025-01-17 VITALS
HEART RATE: 67 BPM | HEIGHT: 74 IN | OXYGEN SATURATION: 99 % | SYSTOLIC BLOOD PRESSURE: 128 MMHG | DIASTOLIC BLOOD PRESSURE: 74 MMHG | WEIGHT: 217.38 LBS | BODY MASS INDEX: 27.9 KG/M2

## 2025-01-17 DIAGNOSIS — I10 PRIMARY HYPERTENSION: ICD-10-CM

## 2025-01-17 DIAGNOSIS — N18.30 STAGE 3 CHRONIC KIDNEY DISEASE, UNSPECIFIED WHETHER STAGE 3A OR 3B CKD: ICD-10-CM

## 2025-01-17 DIAGNOSIS — Z01.818 PREOP EXAM FOR INTERNAL MEDICINE: ICD-10-CM

## 2025-01-17 DIAGNOSIS — I10 PRIMARY HYPERTENSION: Primary | ICD-10-CM

## 2025-01-17 DIAGNOSIS — E78.00 PURE HYPERCHOLESTEROLEMIA: ICD-10-CM

## 2025-01-17 DIAGNOSIS — Z01.810 PREOP CARDIOVASCULAR EXAM: ICD-10-CM

## 2025-01-17 LAB
OHS QRS DURATION: 94 MS
OHS QTC CALCULATION: 418 MS

## 2025-01-17 PROCEDURE — 3078F DIAST BP <80 MM HG: CPT | Mod: CPTII,S$GLB,, | Performed by: INTERNAL MEDICINE

## 2025-01-17 PROCEDURE — 99999 PR PBB SHADOW E&M-EST. PATIENT-LVL V: CPT | Mod: PBBFAC,,, | Performed by: INTERNAL MEDICINE

## 2025-01-17 PROCEDURE — 3008F BODY MASS INDEX DOCD: CPT | Mod: CPTII,S$GLB,, | Performed by: INTERNAL MEDICINE

## 2025-01-17 PROCEDURE — 3052F HG A1C>EQUAL 8.0%<EQUAL 9.0%: CPT | Mod: CPTII,S$GLB,, | Performed by: INTERNAL MEDICINE

## 2025-01-17 PROCEDURE — 3074F SYST BP LT 130 MM HG: CPT | Mod: CPTII,S$GLB,, | Performed by: INTERNAL MEDICINE

## 2025-01-17 PROCEDURE — 1159F MED LIST DOCD IN RCRD: CPT | Mod: CPTII,S$GLB,, | Performed by: INTERNAL MEDICINE

## 2025-01-17 PROCEDURE — 93010 ELECTROCARDIOGRAM REPORT: CPT | Mod: ,,, | Performed by: INTERNAL MEDICINE

## 2025-01-17 PROCEDURE — 99214 OFFICE O/P EST MOD 30 MIN: CPT | Mod: S$GLB,,, | Performed by: INTERNAL MEDICINE

## 2025-01-17 PROCEDURE — 93005 ELECTROCARDIOGRAM TRACING: CPT

## 2025-01-17 RX ORDER — ACETIC ACID 20.65 MG/ML
SOLUTION AURICULAR (OTIC)
COMMUNITY
Start: 2024-12-23

## 2025-01-17 RX ORDER — CALCIFEDIOL 30 UG/1
1 CAPSULE, EXTENDED RELEASE ORAL
COMMUNITY

## 2025-01-17 RX ORDER — CIPROFLOXACIN HYDROCHLORIDE 3 MG/ML
SOLUTION/ DROPS OPHTHALMIC
COMMUNITY
Start: 2024-11-06

## 2025-01-17 RX ORDER — FLURBIPROFEN SODIUM 0.3 MG/ML
SOLUTION/ DROPS OPHTHALMIC
COMMUNITY
Start: 2024-11-06

## 2025-01-17 RX ORDER — FUROSEMIDE 40 MG/1
1 TABLET ORAL DAILY
COMMUNITY
Start: 2024-07-16

## 2025-01-17 NOTE — PROGRESS NOTES
Subjective:   Patient ID:  Elie Contreras is a 64 y.o. male who presents for follow-up of No chief complaint on file.  Pt presents for CV clearance for cataracts.EKG is normal.  Patient denies CP, angina or anginal equivalent.  Cath 2024 nonobstructive CAD    Coronary Artery Disease  Presents for follow-up visit. Symptoms include chest pain and shortness of breath. Pertinent negatives include no dizziness, leg swelling, muscle weakness, palpitations or weight gain. The symptoms have been improving. Compliance with diet is good. Compliance with exercise is good. Compliance with medications is good.   Hyperlipidemia  This is a chronic problem. The current episode started more than 1 year ago. The problem is controlled. Pertinent negatives include no chest pain or shortness of breath. Current antihyperlipidemic treatment includes statins. The current treatment provides moderate improvement of lipids. There are no compliance problems.     Hypertension  This is a chronic problem. The current episode started more than 1 year ago. The problem has been gradually improving since onset. The problem is controlled. Pertinent negatives include no chest pain, palpitations or shortness of breath. Past treatments include beta blockers , Ca channel blockers and angiotensin blockers. The current treatment provides moderate improvement. There are no compliance problems.       Review of Systems   Constitutional: Negative. Negative for weight gain.   HENT: Negative.     Eyes: Negative.    Cardiovascular:  Positive for chest pain. Negative for leg swelling and palpitations.   Respiratory:  Positive for shortness of breath.    Endocrine: Negative.    Hematologic/Lymphatic: Negative.    Skin: Negative.    Musculoskeletal:  Negative for muscle weakness.   Gastrointestinal: Negative.    Genitourinary: Negative.    Neurological: Negative.  Negative for dizziness.   Psychiatric/Behavioral: Negative.     Allergic/Immunologic: Negative.     All other systems reviewed and are negative.    No family history on file.  Past Medical History:   Diagnosis Date    Anemia, unspecified     Diabetes mellitus     Heart attack     Hypertension     Mixed hyperlipidemia     Renal disorder      Social History     Socioeconomic History    Marital status:    Tobacco Use    Smoking status: Former    Smokeless tobacco: Never   Substance and Sexual Activity    Alcohol use: Yes     Comment: occas    Drug use: No    Sexual activity: Yes     Social Drivers of Health     Financial Resource Strain: Medium Risk (2/29/2024)    [facility], [facility]    Overall Financial Resource Strain (CARDIA)     Difficulty of Paying Living Expenses: Somewhat hard   Food Insecurity: No Food Insecurity (9/24/2024)    [facility]    Hunger Vital Sign     Worried About Running Out of Food in the Last Year: Never true     Ran Out of Food in the Last Year: Never true   Transportation Needs: No Transportation Needs (9/24/2024)    [facility]    PRAPARE - Transportation     Lack of Transportation (Medical): No     Lack of Transportation (Non-Medical): No   Physical Activity: Sufficiently Active (2/29/2024)    [facility], [facility]    Exercise Vital Sign     Days of Exercise per Week: 5 days     Minutes of Exercise per Session: 40 min   Stress: No Stress Concern Present (2/29/2024)    [facility], [facility]idiaries and Affiliates    [employer] [place] of Occupational Health - Occupational Stress Questionnaire     Feeling of Stress : Not at all   Housing Stability: Unknown (9/24/2024)    [facility] from [facility] Its Sub[facility]idiaries and Affiliates    Housing Stability Vital Sign     Unable to Pay for Housing in the Last Year: No     Homeless in the Last Year: No     Current Outpatient Medications on File Prior to Visit   Medication Sig Dispense Refill    acetic acid (VOSOL) 2 % otic solution PLACE 4 DROPS INTO THE AFFECTED EAR IN THE MORNING AND 4 DROPS AT NOON AND 4 DROPS BEFORE BEDTIME FOR 7 DAYS      amlodipine (NORVASC) 2.5 MG tablet Take 2.5 mg by mouth once daily.      atorvastatin (LIPITOR) 40 MG tablet Take 1 tablet (40 mg total) by mouth every evening. 90 tablet 3    carvediloL (COREG) 12.5 MG tablet TAKE 1 TABLET BY MOUTH IN THE MORNING AND 1 IN THE EVENING WITH MEALS      ciprofloxacin HCl (CILOXAN) 0.3 % ophthalmic solution       clopidogreL (PLAVIX) 75 mg tablet Take 1 tablet (75 mg total) by mouth once daily. 30 tablet 11    dapagliflozin propanediol (FARXIGA) 10 mg tablet Take 1 tablet (10 mg total) by mouth once daily. 30 tablet 6    flurbiprofen (OCUFEN) 0.03 % ophthalmic solution       furosemide (LASIX) 40 MG tablet Take 1 tablet by mouth once daily.      isosorbide mononitrate (IMDUR) 30 MG 24 hr tablet Take 1 tablet (30 mg total) by mouth once daily. Take 1/2 tablet 90 tablet 3    levothyroxine (SYNTHROID) 50 MCG tablet Take 1 tablet (50 mcg total) by mouth daily before breakfast. 30 tablet 11    linaGLIPtin (TRADJENTA) 5 mg Tab tablet Take 1 tablet by mouth once daily.      losartan (COZAAR) 25 MG tablet Take 25 mg by mouth once daily.      nitroGLYCERIN (NITROSTAT) 0.4 MG SL tablet Place 1 tablet (0.4 mg total) under the tongue every 5 (five) minutes as needed for Chest pain. May administer up to 3 tablets in  a 15-minute period. 25 tablet 2    RAYALDEE 30 mcg Cs24 Take 1 tablet by mouth.      sodium bicarbonate 650 MG tablet Take 650 mg by mouth 3 (three) times daily.      aspirin (ECOTRIN) 81 MG EC tablet Take 1 tablet (81 mg total) by mouth once daily. 360 tablet 0    cetirizine (ZYRTEC) 10 MG tablet Take 1 tablet (10 mg total) by mouth daily as needed for Allergies. 30 tablet 0    metformin (GLUCOPHAGE) 1000 MG tablet Take 1,000 mg by mouth 2 (two) times daily with meals. (Patient not taking: Reported on 1/17/2025)       No current facility-administered medications on file prior to visit.     Review of patient's allergies indicates:  No Known Allergies    Objective:     Physical Exam  Vitals and nursing note reviewed.   Constitutional:       Appearance: He is well-developed.   HENT:      Head: Normocephalic and atraumatic.   Eyes:      Conjunctiva/sclera: Conjunctivae normal.      Pupils: Pupils are equal, round, and reactive to light.   Cardiovascular:      Rate and Rhythm: Normal rate and regular rhythm.      Pulses: Intact distal pulses.      Heart sounds: Normal heart sounds.   Pulmonary:      Effort: Pulmonary effort is normal.      Breath sounds: Normal breath sounds.   Abdominal:      General: Bowel sounds are normal.      Palpations: Abdomen is soft.   Musculoskeletal:      Cervical back: Normal range of motion and neck supple.   Skin:     General: Skin is warm and dry.   Neurological:      Mental Status: He is alert and oriented to person, place, and time.         Assessment:     1. Primary hypertension    2. Preop exam for internal medicine    3. Pure hypercholesterolemia    4. Stage 3 chronic kidney disease, unspecified whether stage 3a or 3b CKD    5. Preop cardiovascular exam        Plan:     Primary hypertension    Preop exam for internal medicine  -     Ambulatory referral/consult to Cardiology    Pure hypercholesterolemia    Stage 3 chronic kidney disease, unspecified whether stage 3a or 3b  CKD    Preop cardiovascular exam    Continue norvasc, coreg, losartan-HTN  Continue statin-HLP  Continue asa, imdur - CAD  Pt cleared for procedure/surgery at moderate CV risk

## 2025-01-17 NOTE — TELEPHONE ENCOUNTER
----- Message from Reese sent at 1/17/2025 11:04 AM CST -----  Contact: sri Delgadillo is requesting a call back in regards to getting surgery clearance  Please call him at [phone]

## 2025-02-12 ENCOUNTER — HOSPITAL ENCOUNTER (EMERGENCY)
Facility: HOSPITAL | Age: 65
Discharge: HOME | End: 2025-02-12
Attending: EMERGENCY MEDICINE
Payer: MEDICARE

## 2025-02-12 VITALS
BODY MASS INDEX: 28.01 KG/M2 | RESPIRATION RATE: 16 BRPM | WEIGHT: 218.25 LBS | DIASTOLIC BLOOD PRESSURE: 73 MMHG | TEMPERATURE: 98 F | HEIGHT: 74 IN | OXYGEN SATURATION: 100 % | HEART RATE: 63 BPM | SYSTOLIC BLOOD PRESSURE: 127 MMHG

## 2025-02-12 DIAGNOSIS — R68.83 CHILLS: Primary | ICD-10-CM

## 2025-02-12 LAB
ALBUMIN SERPL BCP-MCNC: 3.7 G/DL (ref 3.5–5.2)
ALP SERPL-CCNC: 79 U/L (ref 40–150)
ALT SERPL W/O P-5'-P-CCNC: 31 U/L (ref 10–44)
ANION GAP SERPL CALC-SCNC: 12 MMOL/L (ref 8–16)
AST SERPL-CCNC: 29 U/L (ref 10–40)
BASOPHILS # BLD AUTO: 0.01 K/UL (ref 0–0.2)
BASOPHILS NFR BLD: 0.4 % (ref 0–1.9)
BILIRUB SERPL-MCNC: 0.5 MG/DL (ref 0.1–1)
BNP SERPL-MCNC: 90 PG/ML (ref 0–99)
BUN SERPL-MCNC: 49 MG/DL (ref 8–23)
CALCIUM SERPL-MCNC: 8.7 MG/DL (ref 8.7–10.5)
CHLORIDE SERPL-SCNC: 109 MMOL/L (ref 95–110)
CO2 SERPL-SCNC: 19 MMOL/L (ref 23–29)
CREAT SERPL-MCNC: 3.6 MG/DL (ref 0.5–1.4)
DIFFERENTIAL METHOD BLD: ABNORMAL
EOSINOPHIL # BLD AUTO: 0.1 K/UL (ref 0–0.5)
EOSINOPHIL NFR BLD: 4 % (ref 0–8)
ERYTHROCYTE [DISTWIDTH] IN BLOOD BY AUTOMATED COUNT: 11.9 % (ref 11.5–14.5)
EST. GFR  (NO RACE VARIABLE): 18.2 ML/MIN/1.73 M^2
GLUCOSE SERPL-MCNC: 137 MG/DL (ref 70–110)
HCT VFR BLD AUTO: 33.3 % (ref 40–54)
HGB BLD-MCNC: 11.3 G/DL (ref 14–18)
IMM GRANULOCYTES # BLD AUTO: 0.01 K/UL (ref 0–0.04)
IMM GRANULOCYTES NFR BLD AUTO: 0.4 % (ref 0–0.5)
LYMPHOCYTES # BLD AUTO: 0.7 K/UL (ref 1–4.8)
LYMPHOCYTES NFR BLD: 23.9 % (ref 18–48)
MCH RBC QN AUTO: 29.8 PG (ref 27–31)
MCHC RBC AUTO-ENTMCNC: 33.9 G/DL (ref 32–36)
MCV RBC AUTO: 88 FL (ref 82–98)
MONOCYTES # BLD AUTO: 0.4 K/UL (ref 0.3–1)
MONOCYTES NFR BLD: 14.5 % (ref 4–15)
NEUTROPHILS # BLD AUTO: 1.6 K/UL (ref 1.8–7.7)
NEUTROPHILS NFR BLD: 56.8 % (ref 38–73)
NRBC BLD-RTO: 0 /100 WBC
OHS QRS DURATION: 88 MS
OHS QTC CALCULATION: 431 MS
PLATELET # BLD AUTO: 128 K/UL (ref 150–450)
PMV BLD AUTO: 9.5 FL (ref 9.2–12.9)
POTASSIUM SERPL-SCNC: 4.4 MMOL/L (ref 3.5–5.1)
PROT SERPL-MCNC: 7.6 G/DL (ref 6–8.4)
RBC # BLD AUTO: 3.79 M/UL (ref 4.6–6.2)
SODIUM SERPL-SCNC: 140 MMOL/L (ref 136–145)
TROPONIN I SERPL DL<=0.01 NG/ML-MCNC: 0.04 NG/ML (ref 0–0.03)
TROPONIN I SERPL DL<=0.01 NG/ML-MCNC: 0.04 NG/ML (ref 0–0.03)
WBC # BLD AUTO: 2.76 K/UL (ref 3.9–12.7)

## 2025-02-12 PROCEDURE — 84484 ASSAY OF TROPONIN QUANT: CPT | Mod: 91,ER | Performed by: EMERGENCY MEDICINE

## 2025-02-12 PROCEDURE — 83880 ASSAY OF NATRIURETIC PEPTIDE: CPT | Mod: ER | Performed by: EMERGENCY MEDICINE

## 2025-02-12 PROCEDURE — 80053 COMPREHEN METABOLIC PANEL: CPT | Mod: ER | Performed by: EMERGENCY MEDICINE

## 2025-02-12 PROCEDURE — 99285 EMERGENCY DEPT VISIT HI MDM: CPT | Mod: 25,ER

## 2025-02-12 PROCEDURE — 85025 COMPLETE CBC W/AUTO DIFF WBC: CPT | Mod: ER | Performed by: EMERGENCY MEDICINE

## 2025-02-12 PROCEDURE — 93005 ELECTROCARDIOGRAM TRACING: CPT | Mod: ER

## 2025-02-12 NOTE — ED PROVIDER NOTES
"Encounter Date: 2/12/2025       History     Chief Complaint   Patient presents with    Chills     Awoke with chills, c/o "my chest is cold, my arms are cold" Denies chest pain or SOB     HPI  Pt feels cold to arms and chest, feels like when he had his heart attack. Pt denies cp/sob. Sx started this am when he awoke.    Review of patient's allergies indicates:  No Known Allergies  Past Medical History:   Diagnosis Date    Anemia, unspecified     Diabetes mellitus     Heart attack     Hypertension     Mixed hyperlipidemia     Renal disorder      Past Surgical History:   Procedure Laterality Date    CHOLECYSTECTOMY      KNEE SURGERY      right    LEFT HEART CATHETERIZATION Left 8/8/2024    Procedure: Left heart cath;  Surgeon: Jhonny Fierro MD;  Location[facility] CATH LAB;  Service: Cardiology;  Laterality: Left;     No family history on file.  Social History     Tobacco Use    Smoking status: Former    Smokeless tobacco: Never   Substance Use Topics    Alcohol use: Yes     Comment: occas    Drug use: No     Review of Systems   Constitutional:  Negative for fever.   HENT:  Negative for sore throat.    Respiratory:  Negative for shortness of breath.    Cardiovascular:  Negative for chest pain.   Gastrointestinal:  Negative for nausea.   Genitourinary:  Negative for dysuria.   Musculoskeletal:  Negative for back pain.   Skin:  Negative for rash.   Neurological:  Negative for weakness.   Hematological:  Does not bruise/bleed easily.       Physical Exam     Initial Vitals [02/12/25 0701]   BP Pulse Resp Temp SpO2   129/73 77 16 97.8 °F (36.6 °C) 100 %      MAP       --         Physical Exam    Nursing note and vitals reviewed.  Constitutional: He appears well-developed and well-nourished.   HENT:   Head: Normocephalic and atraumatic. Mouth/Throat: Oropharynx is clear and moist.   Eyes: Conjunctivae and EOM are normal. Pupils are equal, round, and reactive to light.   Neck: Neck supple.   Normal range of " motion.  Cardiovascular:  Normal rate, regular rhythm and normal heart sounds.           Pulmonary/Chest: Breath sounds normal.   Abdominal: Abdomen is soft. Bowel sounds are normal.   Musculoskeletal:         General: Normal range of motion.      Cervical back: Normal range of motion and neck supple.     Neurological: He is alert and oriented to person, place, and time. He has normal strength.   Skin: Skin is warm and dry.   Psychiatric: He has a normal mood and affect. Thought content normal.         ED Course   Procedures  Labs Reviewed   CBC W/ AUTO DIFFERENTIAL - Abnormal       Result Value    WBC 2.76 (*)     RBC 3.79 (*)     Hemoglobin 11.3 (*)     Hematocrit 33.3 (*)     MCV 88      MCH 29.8      MCHC 33.9      RDW 11.9      Platelets 128 (*)     MPV 9.5      Immature Granulocytes 0.4      Gran # (ANC) 1.6 (*)     Immature Grans (Abs) 0.01      Lymph # 0.7 (*)     Mono # 0.4      Eos # 0.1      Baso # 0.01      nRBC 0      Gran % 56.8      Lymph % 23.9      Mono % 14.5      Eosinophil % 4.0      Basophil % 0.4      Differential Method Automated     COMPREHENSIVE METABOLIC PANEL - Abnormal    Sodium 140      Potassium 4.4      Chloride 109      CO2 19 (*)     Glucose 137 (*)     BUN 49 (*)     Creatinine 3.6 (*)     Calcium 8.7      Total Protein 7.6      Albumin 3.7      Total Bilirubin 0.5      Alkaline Phosphatase 79      AST 29      ALT 31      eGFR 18.2 (*)     Anion Gap 12     TROPONIN I - Abnormal    Troponin I 0.043 (*)    TROPONIN I - Abnormal    Troponin I 0.040 (*)    B-TYPE NATRIURETIC PEPTIDE    BNP 90       EKG Readings: (Independently Interpreted)   Rhythm: Sinus Arrhythmia. Heart Rate: 79. ST Segments: Normal ST Segments. Axis: Normal. Q Waves: II, III, V5 and V6. Clinical Impression: AV Block - 1st Degree and Sinus Arrhythmia     ECG Results              EKG 12-lead (In process)        Collection Time Result Time QRS Duration OHS QTC Calculation    02/12/25 06:59:54 02/12/25 08:30:22 88 431                      In process by Interface, Lab In [facility] (02/12/25 08:30:29)                   Narrative:    Test Reason : R07.9,    Vent. Rate :  79 BPM     Atrial Rate :  79 BPM     P-R Int : 224 ms          QRS Dur :  88 ms      QT Int : 376 ms       P-R-T Axes :  57 -19 267 degrees    QTcB Int : 431 ms    Sinus rhythm with 1st degree A-V block with occasional Premature  ventricular complexes  T wave abnormality, consider inferolateral ischemia  Abnormal ECG  When compared with ECG of 17-Jan-2025 12:41,  Premature ventricular complexes are now Present    Referred By: AAAREFERRAL SELF           Confirmed By:                                   Imaging Results              X-Ray Chest AP Portable (Final result)  Result time 02/12/25 08:03:47      Final result by Sorin Burrell MD (02/12/25 08:03:47)                   Impression:      No acute finding in the chest.      Electronically signed by: Sorin Burrell  Date:    02/12/2025  Time:    08:03               Narrative:    EXAMINATION:  XR CHEST AP PORTABLE    CLINICAL HISTORY:  Chest Pain;    FINDINGS:  Comparison: 08/06/2024.    Mediastinal silhouette is within normal limits.  The lungs are clear.  No pneumothorax or pleural effusion.  No acute osseous finding.                        Wet Read by Gordo Gilbetr MD (02/12/25 07:50:57, [facility] - Emergency Dept, Emergency Medicine)    neg                                     Medications - No data to display  Medical Decision Making  DDx ACS, pneumonia, CHF, florence, dehydration    Problems Addressed:  Chills: acute illness or injury    Amount and/or Complexity of Data Reviewed  Labs: ordered.  Radiology: ordered and independent interpretation performed.  ECG/medicine tests: ordered and independent interpretation performed. Decision-making details documented in ED Course.    Risk  Decision regarding hospitalization.    Pt feels improved and is ready for discharge                                  Clinical  Impression:  Final diagnoses:  [R68.83] Chills (Primary)          ED Disposition Condition    Discharge Stable          ED Prescriptions    None       Follow-up Information       Follow up With Specialties Details Why Contact Info    Rigoberto Champion MD Internal Medicine Schedule an appointment as soon as possible for a visit   [address] [place] [id number]  [phone]      [facility] - Emergency Dept Emergency Medicine  If symptoms worsen [id number] [facility] [address]  Emergency Department  [facility] [id number] [phone]    Cardiology  Call in 2 days               Gordo Gilbert MD  02/12/25 1041

## 2025-02-18 ENCOUNTER — LAB VISIT (OUTPATIENT)
Dept: LAB | Facility: HOSPITAL | Age: 65
End: 2025-02-18
Attending: INTERNAL MEDICINE
Payer: MEDICARE

## 2025-02-18 DIAGNOSIS — N25.81 SECONDARY HYPERPARATHYROIDISM OF RENAL ORIGIN: ICD-10-CM

## 2025-02-18 DIAGNOSIS — N18.6 TYPE 2 DIABETES MELLITUS WITH END-STAGE RENAL DISEASE: ICD-10-CM

## 2025-02-18 DIAGNOSIS — E11.22 TYPE 2 DIABETES MELLITUS WITH END-STAGE RENAL DISEASE: ICD-10-CM

## 2025-02-18 DIAGNOSIS — N18.4 CHRONIC KIDNEY DISEASE, STAGE IV (SEVERE): ICD-10-CM

## 2025-02-18 DIAGNOSIS — E55.9 AVITAMINOSIS D: ICD-10-CM

## 2025-02-18 LAB
ALBUMIN SERPL BCP-MCNC: 4 G/DL (ref 3.5–5.2)
ANION GAP SERPL CALC-SCNC: 10 MMOL/L (ref 8–16)
BUN SERPL-MCNC: 50 MG/DL (ref 8–23)
CALCIUM SERPL-MCNC: 9.5 MG/DL (ref 8.7–10.5)
CHLORIDE SERPL-SCNC: 106 MMOL/L (ref 95–110)
CO2 SERPL-SCNC: 22 MMOL/L (ref 23–29)
CREAT SERPL-MCNC: 3.6 MG/DL (ref 0.5–1.4)
ERYTHROCYTE [DISTWIDTH] IN BLOOD BY AUTOMATED COUNT: 11.8 % (ref 11.5–14.5)
EST. GFR  (NO RACE VARIABLE): 18.2 ML/MIN/1.73 M^2
GLUCOSE SERPL-MCNC: 223 MG/DL (ref 70–110)
HCT VFR BLD AUTO: 35.5 % (ref 40–54)
HGB BLD-MCNC: 12 G/DL (ref 14–18)
MCH RBC QN AUTO: 29.9 PG (ref 27–31)
MCHC RBC AUTO-ENTMCNC: 33.8 G/DL (ref 32–36)
MCV RBC AUTO: 89 FL (ref 82–98)
PHOSPHATE SERPL-MCNC: 3.7 MG/DL (ref 2.7–4.5)
PLATELET # BLD AUTO: 136 K/UL (ref 150–450)
PMV BLD AUTO: 9.1 FL (ref 9.2–12.9)
POTASSIUM SERPL-SCNC: 4.7 MMOL/L (ref 3.5–5.1)
RBC # BLD AUTO: 4.01 M/UL (ref 4.6–6.2)
SODIUM SERPL-SCNC: 138 MMOL/L (ref 136–145)
WBC # BLD AUTO: 2.5 K/UL (ref 3.9–12.7)

## 2025-02-18 PROCEDURE — 85027 COMPLETE CBC AUTOMATED: CPT | Mod: PO | Performed by: INTERNAL MEDICINE

## 2025-02-18 PROCEDURE — 84466 ASSAY OF TRANSFERRIN: CPT | Performed by: INTERNAL MEDICINE

## 2025-02-18 PROCEDURE — 83970 ASSAY OF PARATHORMONE: CPT | Performed by: INTERNAL MEDICINE

## 2025-02-18 PROCEDURE — 80069 RENAL FUNCTION PANEL: CPT | Mod: PO | Performed by: INTERNAL MEDICINE

## 2025-02-18 PROCEDURE — 82043 UR ALBUMIN QUANTITATIVE: CPT | Performed by: INTERNAL MEDICINE

## 2025-02-18 PROCEDURE — 82728 ASSAY OF FERRITIN: CPT | Performed by: INTERNAL MEDICINE

## 2025-02-18 PROCEDURE — 82306 VITAMIN D 25 HYDROXY: CPT | Performed by: INTERNAL MEDICINE

## 2025-02-19 LAB
25(OH)D3+25(OH)D2 SERPL-MCNC: 31 NG/ML (ref 30–96)
ALBUMIN/CREAT UR: 132.2 UG/MG (ref 0–30)
CREAT UR-MCNC: 59 MG/DL (ref 23–375)
FERRITIN SERPL-MCNC: 50 NG/ML (ref 20–300)
IRON SERPL-MCNC: 66 UG/DL (ref 45–160)
MICROALBUMIN UR DL<=1MG/L-MCNC: 78 UG/ML
PTH-INTACT SERPL-MCNC: 272.3 PG/ML (ref 9–77)
SATURATED IRON: 20 % (ref 20–50)
TOTAL IRON BINDING CAPACITY: 323 UG/DL (ref 250–450)
TRANSFERRIN SERPL-MCNC: 218 MG/DL (ref 200–375)

## 2025-02-24 ENCOUNTER — TELEPHONE (OUTPATIENT)
Dept: CARDIOLOGY | Facility: CLINIC | Age: 65
End: 2025-02-24
Payer: MEDICARE

## 2025-02-24 NOTE — TELEPHONE ENCOUNTER
----- Message from Vy sent at 2/24/2025  4:39 PM CST -----  Regarding: Appointment  Contact: patient  Per phone call with patient, he stated that he would like to schedule an appointment to see the physician regarding pain in the chest area,shoulder,arm like muscle spam's.  Please return call at [phone] (home).Thanks,SJ

## 2025-02-24 NOTE — TELEPHONE ENCOUNTER
Attempted unsuccessfully to reach patient. Left voicemail for patient to call back to discuss  concerns

## 2025-03-03 ENCOUNTER — TELEPHONE (OUTPATIENT)
Dept: CARDIOLOGY | Facility: CLINIC | Age: 65
End: 2025-03-03
Payer: MEDICARE

## 2025-03-03 NOTE — TELEPHONE ENCOUNTER
Dr. Quiroz with [facility] is requesting clearance for patient  Procedure: colonoscopy  Date of procedure: 03/18/2025  Please include instructions on length of time patient can hold Plavix prior to the procedure.

## 2025-03-14 DIAGNOSIS — I50.9 HEART FAILURE, UNSPECIFIED HF CHRONICITY, UNSPECIFIED HEART FAILURE TYPE: Primary | ICD-10-CM

## 2025-03-14 DIAGNOSIS — I25.2 HISTORY OF NON-ST ELEVATION MYOCARDIAL INFARCTION (NSTEMI): ICD-10-CM

## 2025-04-11 ENCOUNTER — OFFICE VISIT (OUTPATIENT)
Dept: CARDIOLOGY | Facility: CLINIC | Age: 65
End: 2025-04-11
Payer: MEDICARE

## 2025-04-11 VITALS
SYSTOLIC BLOOD PRESSURE: 118 MMHG | HEIGHT: 74 IN | OXYGEN SATURATION: 99 % | WEIGHT: 218.38 LBS | RESPIRATION RATE: 16 BRPM | DIASTOLIC BLOOD PRESSURE: 73 MMHG | BODY MASS INDEX: 28.03 KG/M2 | HEART RATE: 70 BPM

## 2025-04-11 DIAGNOSIS — I25.2 HISTORY OF NON-ST ELEVATION MYOCARDIAL INFARCTION (NSTEMI): ICD-10-CM

## 2025-04-11 DIAGNOSIS — Z01.810 PREOP CARDIOVASCULAR EXAM: Primary | ICD-10-CM

## 2025-04-11 DIAGNOSIS — I50.9 HEART FAILURE, UNSPECIFIED HF CHRONICITY, UNSPECIFIED HEART FAILURE TYPE: ICD-10-CM

## 2025-04-11 DIAGNOSIS — E78.2 MIXED HYPERLIPIDEMIA: ICD-10-CM

## 2025-04-11 DIAGNOSIS — N18.30 STAGE 3 CHRONIC KIDNEY DISEASE, UNSPECIFIED WHETHER STAGE 3A OR 3B CKD: ICD-10-CM

## 2025-04-11 DIAGNOSIS — I10 PRIMARY HYPERTENSION: ICD-10-CM

## 2025-04-11 PROCEDURE — 3078F DIAST BP <80 MM HG: CPT | Mod: CPTII,S$GLB,, | Performed by: INTERNAL MEDICINE

## 2025-04-11 PROCEDURE — 3060F POS MICROALBUMINURIA REV: CPT | Mod: CPTII,S$GLB,, | Performed by: INTERNAL MEDICINE

## 2025-04-11 PROCEDURE — 3066F NEPHROPATHY DOC TX: CPT | Mod: CPTII,S$GLB,, | Performed by: INTERNAL MEDICINE

## 2025-04-11 PROCEDURE — 99999 PR PBB SHADOW E&M-EST. PATIENT-LVL IV: CPT | Mod: PBBFAC,,, | Performed by: INTERNAL MEDICINE

## 2025-04-11 PROCEDURE — 1159F MED LIST DOCD IN RCRD: CPT | Mod: CPTII,S$GLB,, | Performed by: INTERNAL MEDICINE

## 2025-04-11 PROCEDURE — 4010F ACE/ARB THERAPY RXD/TAKEN: CPT | Mod: CPTII,S$GLB,, | Performed by: INTERNAL MEDICINE

## 2025-04-11 PROCEDURE — 3008F BODY MASS INDEX DOCD: CPT | Mod: CPTII,S$GLB,, | Performed by: INTERNAL MEDICINE

## 2025-04-11 PROCEDURE — 99214 OFFICE O/P EST MOD 30 MIN: CPT | Mod: S$GLB,,, | Performed by: INTERNAL MEDICINE

## 2025-04-11 PROCEDURE — 3074F SYST BP LT 130 MM HG: CPT | Mod: CPTII,S$GLB,, | Performed by: INTERNAL MEDICINE

## 2025-04-11 PROCEDURE — 3052F HG A1C>EQUAL 8.0%<EQUAL 9.0%: CPT | Mod: CPTII,S$GLB,, | Performed by: INTERNAL MEDICINE

## 2025-04-11 PROCEDURE — 1160F RVW MEDS BY RX/DR IN RCRD: CPT | Mod: CPTII,S$GLB,, | Performed by: INTERNAL MEDICINE

## 2025-04-11 RX ORDER — ASPIRIN 81 MG/1
81 TABLET ORAL DAILY
COMMUNITY

## 2025-04-11 RX ORDER — ATORVASTATIN CALCIUM 40 MG/1
40 TABLET, FILM COATED ORAL DAILY
COMMUNITY

## 2025-04-11 NOTE — PROGRESS NOTES
Subjective:   Patient ID:  Elie Contreras is a 64 y.o. male who presents for follow-up of No chief complaint on file.  Pt with atypical CP- at rest or exertion, numbness,diffuse, last 1 minute.  No sx on exertion. Cath 2024 nonobstructive    Coronary Artery Disease  Presents for follow-up visit. Symptoms include chest pain and shortness of breath. Pertinent negatives include no dizziness, leg swelling, muscle weakness, palpitations or weight gain. The symptoms have been improving. Compliance with diet is good. Compliance with exercise is good. Compliance with medications is good.   Hyperlipidemia  This is a chronic problem. The current episode started more than 1 year ago. The problem is controlled. Pertinent negatives include no chest pain or shortness of breath. Current antihyperlipidemic treatment includes statins. The current treatment provides moderate improvement of lipids. There are no compliance problems.     Hypertension  This is a chronic problem. The current episode started more than 1 year ago. The problem has been gradually improving since onset. The problem is controlled. Pertinent negatives include no chest pain, palpitations or shortness of breath. Past treatments include beta blockers and angiotensin blockers. The current treatment provides moderate improvement. There are no compliance problems.       Review of Systems   Constitutional: Negative. Negative for weight gain.   HENT: Negative.     Eyes: Negative.    Cardiovascular:  Positive for chest pain. Negative for leg swelling and palpitations.   Respiratory:  Positive for shortness of breath.    Endocrine: Negative.    Hematologic/Lymphatic: Negative.    Skin: Negative.    Musculoskeletal:  Negative for muscle weakness.   Gastrointestinal: Negative.    Genitourinary: Negative.    Neurological: Negative.  Negative for dizziness.   Psychiatric/Behavioral: Negative.     Allergic/Immunologic: Negative.    All other systems reviewed and are  negative.    No family history on file.  Past Medical History:   Diagnosis Date    Anemia, unspecified     Diabetes mellitus     Heart attack     Hypertension     Mixed hyperlipidemia     Renal disorder      Social History[1]  Medications Ordered Prior to Encounter[2]  Review of patient's allergies indicates:  No Known Allergies    Objective:     Physical Exam  Vitals and nursing note reviewed.   Constitutional:       Appearance: He is well-developed.   HENT:      Head: Normocephalic and atraumatic.   Eyes:      Conjunctiva/sclera: Conjunctivae normal.      Pupils: Pupils are equal, round, and reactive to light.   Cardiovascular:      Rate and Rhythm: Normal rate and regular rhythm.      Pulses: Intact distal pulses.      Heart sounds: Normal heart sounds.   Pulmonary:      Effort: Pulmonary effort is normal.      Breath sounds: Normal breath sounds.   Abdominal:      General: Bowel sounds are normal.      Palpations: Abdomen is soft.   Musculoskeletal:      Cervical back: Normal range of motion and neck supple.   Skin:     General: Skin is warm and dry.   Neurological:      Mental Status: He is alert and oriented to person, place, and time.         Assessment:     1. Preop cardiovascular exam    2. Heart failure, unspecified HF chronicity, unspecified heart failure type    3. History of non-ST elevation myocardial infarction (NSTEMI)    4. Primary hypertension    5. Mixed hyperlipidemia    6. Stage 3 chronic kidney disease, unspecified whether stage 3a or 3b CKD        Plan:     Preop cardiovascular exam    Heart failure, unspecified HF chronicity, unspecified heart failure type  -     Ambulatory referral/consult to Cardiology    History of non-ST elevation myocardial infarction (NSTEMI)  -     Ambulatory referral/consult to Cardiology    Primary hypertension    Mixed hyperlipidemia    Stage 3 chronic kidney disease, unspecified whether stage 3a or 3b CKD      PPI or NSAID CP  Pt cleared for procedure/surgery at  low CV risk   Continue  coreg, losartan-HTN  Continue statin-HLP  Continue asa, imdur - CAD         [1]   Social History  Socioeconomic History    Marital status:    Tobacco Use    Smoking status: Former    Smokeless tobacco: Never   Substance and Sexual Activity    Alcohol use: Yes     Comment: occas    Drug use: No    Sexual activity: Yes     Social Drivers of Health     Financial Resource Strain: Low Risk  (3/13/2025)    [facility]    Overall Financial Resource Strain (CARDIA)     Difficulty of Paying Living Expenses: Not very hard   Food Insecurity: No Food Insecurity (3/13/2025)    [facility]    Hunger Vital Sign     Worried About Running Out of Food in the Last Year: Never true     Ran Out of Food in the Last Year: Never true   Transportation Needs: No Transportation Needs (3/13/2025)    [facility]    PRAPARE - Transportation     Lack of Transportation (Medical): No     Lack of Transportation (Non-Medical): No   Physical Activity: Sufficiently Active (3/13/2025)    [facility]    Exercise Vital Sign     Days of Exercise per Week: 4 days     Minutes of Exercise per Session: 40 min   Stress: No Stress Concern Present (3/13/2025)    [facility]    [employer] [place] [employer] - Occupational Stress Questionnaire     Feeling of Stress : Not at all   Housing Stability: Low Risk  (3/13/2025)    [facility]    Housing Stability Vital Sign     Unable to Pay for Housing in the  Last Year: No     Number of Times Moved in the Last Year: 0     Homeless in the Last Year: No   [2]   Current Outpatient Medications on File Prior to Visit   Medication Sig Dispense Refill    acetic acid (VOSOL) 2 % otic solution PLACE 4 DROPS INTO THE AFFECTED EAR IN THE MORNING AND 4 DROPS AT NOON AND 4 DROPS BEFORE BEDTIME FOR 7 DAYS      amlodipine (NORVASC) 2.5 MG tablet Take 2.5 mg by mouth once daily.      aspirin (ECOTRIN) 81 MG EC tablet Take 81 mg by mouth once daily.      atorvastatin (LIPITOR) 40 MG tablet Take 40 mg by mouth once daily.      carvediloL (COREG) 12.5 MG tablet TAKE 1 TABLET BY MOUTH IN THE MORNING AND 1 IN THE EVENING WITH MEALS      ciprofloxacin HCl (CILOXAN) 0.3 % ophthalmic solution       clopidogreL (PLAVIX) 75 mg tablet Take 1 tablet (75 mg total) by mouth once daily. 30 tablet 11    dapagliflozin propanediol (FARXIGA) 10 mg tablet Take 1 tablet (10 mg total) by mouth once daily. 30 tablet 6    flurbiprofen (OCUFEN) 0.03 % ophthalmic solution       furosemide (LASIX) 40 MG tablet Take 1 tablet by mouth once daily.      isosorbide mononitrate (IMDUR) 30 MG 24 hr tablet Take 1 tablet (30 mg total) by mouth once daily. Take 1/2 tablet 90 tablet 3    levothyroxine (SYNTHROID) 50 MCG tablet Take 1 tablet (50 mcg total) by mouth daily before breakfast. 30 tablet 11    linaGLIPtin (TRADJENTA) 5 mg Tab tablet Take 1 tablet by mouth once daily.      losartan (COZAAR) 25 MG tablet Take 25 mg by mouth once daily.      metformin (GLUCOPHAGE) 1000 MG tablet Take 1,000 mg by mouth 2 (two) times daily with meals.      nitroGLYCERIN (NITROSTAT) 0.4 MG SL tablet Place 1 tablet (0.4 mg total) under the tongue every 5 (five) minutes as needed for Chest pain. May administer up to 3 tablets in a 15-minute period. 25 tablet 2    RAYALDEE 30 mcg Cs24 Take 1 tablet by mouth.      sodium bicarbonate 650 MG tablet Take 650 mg by mouth 3 (three) times daily.      cetirizine (ZYRTEC) 10 MG tablet Take 1 tablet  (10 mg total) by mouth daily as needed for Allergies. 30 tablet 0     No current facility-administered medications on file prior to visit.

## 2025-05-22 ENCOUNTER — LAB VISIT (OUTPATIENT)
Dept: LAB | Facility: HOSPITAL | Age: 65
End: 2025-05-22
Attending: INTERNAL MEDICINE
Payer: MEDICARE

## 2025-05-22 DIAGNOSIS — N18.9 ANEMIA OF CHRONIC RENAL FAILURE, UNSPECIFIED CKD STAGE: ICD-10-CM

## 2025-05-22 DIAGNOSIS — E55.9 AVITAMINOSIS D: ICD-10-CM

## 2025-05-22 DIAGNOSIS — N25.81 SECONDARY HYPERPARATHYROIDISM OF RENAL ORIGIN: ICD-10-CM

## 2025-05-22 DIAGNOSIS — D63.1 ANEMIA OF CHRONIC RENAL FAILURE, UNSPECIFIED CKD STAGE: ICD-10-CM

## 2025-05-22 DIAGNOSIS — N18.4 CHRONIC KIDNEY DISEASE, STAGE IV (SEVERE): ICD-10-CM

## 2025-05-22 DIAGNOSIS — N18.4 CHRONIC KIDNEY DISEASE, STAGE IV (SEVERE): Primary | ICD-10-CM

## 2025-05-22 LAB
25(OH)D3+25(OH)D2 SERPL-MCNC: 30 NG/ML (ref 30–96)
ALBUMIN SERPL BCP-MCNC: 3.6 G/DL (ref 3.5–5.2)
ALBUMIN/CREAT UR: 67.1 UG/MG
ANION GAP (OHS): 10 MMOL/L (ref 8–16)
BUN SERPL-MCNC: 47 MG/DL (ref 8–23)
CALCIUM SERPL-MCNC: 8.8 MG/DL (ref 8.7–10.5)
CHLORIDE SERPL-SCNC: 109 MMOL/L (ref 95–110)
CO2 SERPL-SCNC: 20 MMOL/L (ref 23–29)
CREAT SERPL-MCNC: 3.3 MG/DL (ref 0.5–1.4)
CREAT UR-MCNC: 79 MG/DL (ref 23–375)
ERYTHROCYTE [DISTWIDTH] IN BLOOD BY AUTOMATED COUNT: 12.1 % (ref 11.5–14.5)
FERRITIN SERPL-MCNC: 59 NG/ML (ref 20–300)
GFR SERPLBLD CREATININE-BSD FMLA CKD-EPI: 20 ML/MIN/1.73/M2
GLUCOSE SERPL-MCNC: 174 MG/DL (ref 70–110)
HCT VFR BLD AUTO: 33.7 % (ref 40–54)
HGB BLD-MCNC: 11.3 GM/DL (ref 14–18)
IRON SATN MFR SERPL: 21 % (ref 20–50)
IRON SERPL-MCNC: 66 UG/DL (ref 45–160)
MCH RBC QN AUTO: 30.2 PG (ref 27–31)
MCHC RBC AUTO-ENTMCNC: 33.5 G/DL (ref 32–36)
MCV RBC AUTO: 90 FL (ref 82–98)
MICROALBUMIN UR-MCNC: 53 UG/ML (ref ?–5000)
PHOSPHATE SERPL-MCNC: 4.1 MG/DL (ref 2.7–4.5)
PLATELET # BLD AUTO: 117 K/UL (ref 150–450)
PMV BLD AUTO: 8.7 FL (ref 9.2–12.9)
POTASSIUM SERPL-SCNC: 5 MMOL/L (ref 3.5–5.1)
PTH-INTACT SERPL-MCNC: 405.8 PG/ML (ref 9–77)
RBC # BLD AUTO: 3.74 M/UL (ref 4.6–6.2)
SODIUM SERPL-SCNC: 139 MMOL/L (ref 136–145)
TIBC SERPL-MCNC: 309 UG/DL (ref 250–450)
TRANSFERRIN SERPL-MCNC: 209 MG/DL (ref 200–375)
WBC # BLD AUTO: 2.88 K/UL (ref 3.9–12.7)

## 2025-05-22 PROCEDURE — 36415 COLL VENOUS BLD VENIPUNCTURE: CPT | Mod: PO

## 2025-05-22 PROCEDURE — 83970 ASSAY OF PARATHORMONE: CPT

## 2025-05-22 PROCEDURE — 82570 ASSAY OF URINE CREATININE: CPT

## 2025-05-22 PROCEDURE — 85027 COMPLETE CBC AUTOMATED: CPT | Mod: PO

## 2025-05-22 PROCEDURE — 84466 ASSAY OF TRANSFERRIN: CPT

## 2025-05-22 PROCEDURE — 82306 VITAMIN D 25 HYDROXY: CPT

## 2025-05-22 PROCEDURE — 82728 ASSAY OF FERRITIN: CPT

## 2025-05-22 PROCEDURE — 82565 ASSAY OF CREATININE: CPT | Mod: PO

## 2025-07-01 DIAGNOSIS — E55.9 AVITAMINOSIS D: ICD-10-CM

## 2025-07-01 DIAGNOSIS — N25.81 SECONDARY HYPERPARATHYROIDISM OF RENAL ORIGIN: ICD-10-CM

## 2025-07-01 DIAGNOSIS — N18.4 CHRONIC KIDNEY DISEASE, STAGE IV (SEVERE): Primary | ICD-10-CM

## 2025-07-01 DIAGNOSIS — D63.1 ANEMIA OF CHRONIC RENAL FAILURE, UNSPECIFIED CKD STAGE: ICD-10-CM

## 2025-07-01 DIAGNOSIS — N18.9 ANEMIA OF CHRONIC RENAL FAILURE, UNSPECIFIED CKD STAGE: ICD-10-CM

## 2025-08-15 ENCOUNTER — LAB VISIT (OUTPATIENT)
Dept: LAB | Facility: HOSPITAL | Age: 65
End: 2025-08-15
Attending: INTERNAL MEDICINE
Payer: MEDICARE

## 2025-08-15 DIAGNOSIS — E55.9 AVITAMINOSIS D: ICD-10-CM

## 2025-08-15 DIAGNOSIS — N18.4 CHRONIC KIDNEY DISEASE, STAGE IV (SEVERE): ICD-10-CM

## 2025-08-15 DIAGNOSIS — D63.1 ANEMIA OF CHRONIC RENAL FAILURE, UNSPECIFIED CKD STAGE: ICD-10-CM

## 2025-08-15 DIAGNOSIS — N25.81 SECONDARY HYPERPARATHYROIDISM OF RENAL ORIGIN: ICD-10-CM

## 2025-08-15 DIAGNOSIS — N18.9 ANEMIA OF CHRONIC RENAL FAILURE, UNSPECIFIED CKD STAGE: ICD-10-CM

## 2025-08-15 LAB
25(OH)D3+25(OH)D2 SERPL-MCNC: 29 NG/ML (ref 30–96)
ABSOLUTE EOSINOPHIL (OHS): 0.12 K/UL
ABSOLUTE MONOCYTE (OHS): 0.39 K/UL (ref 0.3–1)
ABSOLUTE NEUTROPHIL COUNT (OHS): 1.91 K/UL (ref 1.8–7.7)
ALBUMIN SERPL BCP-MCNC: 3.9 G/DL (ref 3.5–5.2)
ALBUMIN/CREAT UR: 27.7 UG/MG
ANION GAP (OHS): 8 MMOL/L (ref 8–16)
BASOPHILS # BLD AUTO: 0.02 K/UL
BASOPHILS NFR BLD AUTO: 0.6 %
BUN SERPL-MCNC: 45 MG/DL (ref 8–23)
CALCIUM SERPL-MCNC: 9.2 MG/DL (ref 8.7–10.5)
CHLORIDE SERPL-SCNC: 104 MMOL/L (ref 95–110)
CO2 SERPL-SCNC: 22 MMOL/L (ref 23–29)
CREAT SERPL-MCNC: 3.8 MG/DL (ref 0.5–1.4)
CREAT UR-MCNC: 119 MG/DL (ref 23–375)
ERYTHROCYTE [DISTWIDTH] IN BLOOD BY AUTOMATED COUNT: 12.1 % (ref 11.5–14.5)
FERRITIN SERPL-MCNC: 112 NG/ML (ref 20–300)
GFR SERPLBLD CREATININE-BSD FMLA CKD-EPI: 17 ML/MIN/1.73/M2
GLUCOSE SERPL-MCNC: 333 MG/DL (ref 70–110)
HCT VFR BLD AUTO: 36.4 % (ref 40–54)
HGB BLD-MCNC: 12.6 GM/DL (ref 14–18)
IMM GRANULOCYTES # BLD AUTO: 0 K/UL (ref 0–0.04)
IMM GRANULOCYTES NFR BLD AUTO: 0 % (ref 0–0.5)
IRON SATN MFR SERPL: 22 % (ref 20–50)
IRON SERPL-MCNC: 70 UG/DL (ref 45–160)
LYMPHOCYTES # BLD AUTO: 0.8 K/UL (ref 1–4.8)
MCH RBC QN AUTO: 30.4 PG (ref 27–31)
MCHC RBC AUTO-ENTMCNC: 34.6 G/DL (ref 32–36)
MCV RBC AUTO: 88 FL (ref 82–98)
MICROALBUMIN UR-MCNC: 33 UG/ML (ref ?–5000)
NUCLEATED RBC (/100WBC) (OHS): 0 /100 WBC
PHOSPHATE SERPL-MCNC: 4.1 MG/DL (ref 2.7–4.5)
PLATELET # BLD AUTO: 137 K/UL (ref 150–450)
PMV BLD AUTO: 9.6 FL (ref 9.2–12.9)
POTASSIUM SERPL-SCNC: 4.8 MMOL/L (ref 3.5–5.1)
RBC # BLD AUTO: 4.15 M/UL (ref 4.6–6.2)
RELATIVE EOSINOPHIL (OHS): 3.7 %
RELATIVE LYMPHOCYTE (OHS): 24.7 % (ref 18–48)
RELATIVE MONOCYTE (OHS): 12 % (ref 4–15)
RELATIVE NEUTROPHIL (OHS): 59 % (ref 38–73)
SODIUM SERPL-SCNC: 134 MMOL/L (ref 136–145)
TIBC SERPL-MCNC: 321 UG/DL (ref 250–450)
TRANSFERRIN SERPL-MCNC: 217 MG/DL (ref 200–375)
WBC # BLD AUTO: 3.24 K/UL (ref 3.9–12.7)

## 2025-08-15 PROCEDURE — 85025 COMPLETE CBC W/AUTO DIFF WBC: CPT | Mod: PO

## 2025-08-15 PROCEDURE — 84466 ASSAY OF TRANSFERRIN: CPT

## 2025-08-15 PROCEDURE — 82306 VITAMIN D 25 HYDROXY: CPT

## 2025-08-15 PROCEDURE — 83970 ASSAY OF PARATHORMONE: CPT

## 2025-08-15 PROCEDURE — 82728 ASSAY OF FERRITIN: CPT

## 2025-08-15 PROCEDURE — 82043 UR ALBUMIN QUANTITATIVE: CPT

## 2025-08-15 PROCEDURE — 36415 COLL VENOUS BLD VENIPUNCTURE: CPT | Mod: PO

## 2025-08-15 PROCEDURE — 82310 ASSAY OF CALCIUM: CPT | Mod: PO

## 2025-08-16 LAB — PTH-INTACT SERPL-MCNC: 266.2 PG/ML (ref 9–77)

## 2025-08-31 RX ORDER — CLOPIDOGREL BISULFATE 75 MG/1
75 TABLET ORAL
Qty: 30 TABLET | Refills: 0 | Status: SHIPPED | OUTPATIENT
Start: 2025-08-31

## (undated) DEVICE — PACK HEART CATH BR

## (undated) DEVICE — CATH IMPULSE 6FR MULTI-PAK

## (undated) DEVICE — SHEATH INTRODUCER 6FR 11CM

## (undated) DEVICE — DEVICE PERCLOSE SUT CLSR 6FR

## (undated) DEVICE — CATH DIAG IMPULSE 6FR FL4

## (undated) DEVICE — WIRE GUIDE TEFLON 3CM .035 145

## (undated) DEVICE — CATH DIAG IMPULSE 6FR FR4

## (undated) DEVICE — CATH IMPULSE PIGTAIL 6F 110CM

## (undated) DEVICE — OMNIPAQUE 300MG 150ML VIAL